# Patient Record
Sex: MALE | Race: WHITE | Employment: OTHER | URBAN - NONMETROPOLITAN AREA
[De-identification: names, ages, dates, MRNs, and addresses within clinical notes are randomized per-mention and may not be internally consistent; named-entity substitution may affect disease eponyms.]

---

## 2018-11-13 PROBLEM — Z85.46 HISTORY OF PROSTATE CANCER: Status: ACTIVE | Noted: 2018-11-13

## 2018-11-13 PROBLEM — E89.0 POSTOPERATIVE HYPOTHYROIDISM: Status: ACTIVE | Noted: 2018-11-13

## 2018-11-13 PROBLEM — I10 ESSENTIAL HYPERTENSION: Status: ACTIVE | Noted: 2018-11-13

## 2018-11-13 PROBLEM — M16.0 OSTEOARTHRITIS OF BOTH HIPS: Status: ACTIVE | Noted: 2018-11-13

## 2018-11-13 PROBLEM — E78.5 HYPERLIPIDEMIA: Status: ACTIVE | Noted: 2018-11-13

## 2018-11-13 PROBLEM — Z92.3 H/O HEAD AND NECK RADIATION: Status: ACTIVE | Noted: 2018-11-13

## 2018-12-17 RX ORDER — SIMVASTATIN 20 MG
20 TABLET ORAL NIGHTLY
Qty: 30 TABLET | Refills: 3 | Status: SHIPPED | OUTPATIENT
Start: 2018-12-17 | End: 2018-12-17 | Stop reason: SDUPTHER

## 2018-12-17 RX ORDER — LORATADINE 10 MG/1
10 TABLET ORAL DAILY
Qty: 30 TABLET | Refills: 3 | Status: SHIPPED | OUTPATIENT
Start: 2018-12-17 | End: 2019-04-26 | Stop reason: SDUPTHER

## 2018-12-17 RX ORDER — LEVOTHYROXINE SODIUM 0.12 MG/1
125 TABLET ORAL DAILY
Qty: 30 TABLET | Refills: 3 | Status: SHIPPED | OUTPATIENT
Start: 2018-12-17 | End: 2018-12-17 | Stop reason: SDUPTHER

## 2018-12-17 RX ORDER — MELOXICAM 15 MG/1
15 TABLET ORAL DAILY
Qty: 30 TABLET | Refills: 3 | Status: SHIPPED | OUTPATIENT
Start: 2018-12-17 | End: 2019-04-08 | Stop reason: SDUPTHER

## 2018-12-18 ENCOUNTER — TELEPHONE (OUTPATIENT)
Dept: PRIMARY CARE CLINIC | Age: 83
End: 2018-12-18

## 2018-12-19 RX ORDER — LEVOTHYROXINE SODIUM 0.12 MG/1
125 TABLET ORAL DAILY
Qty: 90 TABLET | Refills: 3 | Status: SHIPPED | OUTPATIENT
Start: 2018-12-19 | End: 2019-04-26 | Stop reason: SDUPTHER

## 2018-12-19 RX ORDER — SIMVASTATIN 20 MG
TABLET ORAL
Qty: 90 TABLET | Refills: 3 | Status: SHIPPED | OUTPATIENT
Start: 2018-12-19 | End: 2019-04-26 | Stop reason: SDUPTHER

## 2019-04-08 RX ORDER — MELOXICAM 15 MG/1
15 TABLET ORAL DAILY
Qty: 30 TABLET | Refills: 3 | Status: SHIPPED | OUTPATIENT
Start: 2019-04-08 | End: 2019-08-02 | Stop reason: SDUPTHER

## 2019-04-08 NOTE — TELEPHONE ENCOUNTER
Last visit: 11/13/18  Last Med refill: 12/17/18  Does patient have enough medication for 72 hours: No      Next Visit Date:  No future appointments.     Health Maintenance   Topic Date Due    TSH testing  07/02/1932    Potassium monitoring  07/02/1932    Creatinine monitoring  07/02/1932    DTaP/Tdap/Td vaccine (1 - Tdap) 07/02/1951    Shingles Vaccine (1 of 2) 07/02/1982    Pneumococcal 65+ years Vaccine (1 of 2 - PCV13) 07/02/1997    Flu vaccine (Season Ended) 09/01/2019       No results found for: LABA1C          ( goal A1C is < 7)   No results found for: LABMICR  No results found for: LDLCHOLESTEROL, LDLCALC    (goal LDL is <100)   No results found for: AST, ALT, BUN  BP Readings from Last 3 Encounters:   11/13/18 (!) 140/76          (goal 120/80)    All Future Testing planned in [facility]              Patient Active Problem List:     Postoperative hypothyroidism     Essential hypertension     Hyperlipidemia     Osteoarthritis of both hips     H/O head and neck radiation     History of prostate cancer

## 2019-04-18 ENCOUNTER — OFFICE VISIT (OUTPATIENT)
Dept: PRIMARY CARE CLINIC | Age: 84
End: 2019-04-18
Payer: COMMERCIAL

## 2019-04-18 VITALS
RESPIRATION RATE: 16 BRPM | HEIGHT: 69 IN | SYSTOLIC BLOOD PRESSURE: 136 MMHG | HEART RATE: 68 BPM | BODY MASS INDEX: 30.07 KG/M2 | DIASTOLIC BLOOD PRESSURE: 88 MMHG | WEIGHT: 203 LBS

## 2019-04-18 DIAGNOSIS — Z85.850 HISTORY OF THYROID CANCER: ICD-10-CM

## 2019-04-18 DIAGNOSIS — M16.0 OSTEOARTHRITIS OF BOTH HIPS, UNSPECIFIED OSTEOARTHRITIS TYPE: ICD-10-CM

## 2019-04-18 DIAGNOSIS — E89.0 POSTOPERATIVE HYPOTHYROIDISM: Primary | ICD-10-CM

## 2019-04-18 DIAGNOSIS — E78.5 HYPERLIPIDEMIA, UNSPECIFIED HYPERLIPIDEMIA TYPE: ICD-10-CM

## 2019-04-18 DIAGNOSIS — Z85.46 HISTORY OF PROSTATE CANCER: ICD-10-CM

## 2019-04-18 DIAGNOSIS — I10 ESSENTIAL HYPERTENSION: ICD-10-CM

## 2019-04-18 PROCEDURE — 99214 OFFICE O/P EST MOD 30 MIN: CPT | Performed by: NURSE PRACTITIONER

## 2019-04-18 ASSESSMENT — PATIENT HEALTH QUESTIONNAIRE - PHQ9
SUM OF ALL RESPONSES TO PHQ QUESTIONS 1-9: 0
SUM OF ALL RESPONSES TO PHQ QUESTIONS 1-9: 0
2. FEELING DOWN, DEPRESSED OR HOPELESS: 0
1. LITTLE INTEREST OR PLEASURE IN DOING THINGS: 0
SUM OF ALL RESPONSES TO PHQ9 QUESTIONS 1 & 2: 0

## 2019-04-18 ASSESSMENT — ENCOUNTER SYMPTOMS
NAUSEA: 0
SHORTNESS OF BREATH: 0
DIARRHEA: 0
PHOTOPHOBIA: 0
COUGH: 0
BACK PAIN: 0
CONSTIPATION: 0
VOMITING: 0

## 2019-04-18 NOTE — PROGRESS NOTES
[address] [facility]  [id number] [facility]  Dept: [phone]    Missy Dorado is a 80 y.o. male who presents today for his medical conditions/complaintsas noted below. Chief Complaint   Patient presents with    Medication Check       HPI:     HPI  He has been taking \"move free\"  For a couple moths  He takes medication as stated. Biggest concern is his wife  His wife's short time memory is getting worse - he is having some trouble getting wife to take medication. His wife sometimes drops pills. Wife sleeps a lot. He remains upset that he can not sing as well as he used to sing. He has neck swelling and stiffness  He has several areas of skin discolor on his scalp some actinic keratosis and some nevi that should be examined closer. No results found for: LDLCHOLESTEROL, LDLCALC    (goal LDL is <100)   No results found for: AST, ALT, BUN  BP Readings from Last 3 Encounters:   19 136/88   18 (!) 140/76          (goal 120/80)    Past Medical History:   Diagnosis Date    Cancer ([address]     prostate, thyroid    Hyperlipidemia     Hypertension       Past Surgical History:   Procedure Laterality Date    CARPAL TUNNEL RELEASE      MOUTH SURGERY      PROSTATECTOMY      THYROIDECTOMY         Family History   Problem Relation Age of Onset    Prostate Cancer Father     Diabetes Brother        Social History     Tobacco Use    Smoking status: Former Smoker     Packs/day: 1.00     Years: 20.00     Pack years: 20.00     Types: Cigarettes     Last attempt to quit:      Years since quittin.3    Smokeless tobacco: Never Used   Substance Use Topics    Alcohol use:  Yes     Alcohol/week: 9.6 oz     Types: 2 Glasses of wine, 14 Shots of liquor per week      Current Outpatient Medications   Medication Sig Dispense Refill    meloxicam (MOBIC) 15 MG tablet Take 1 tablet by mouth daily 30 tablet 3    metoprolol tartrate (LOPRESSOR) 25

## 2019-04-26 ENCOUNTER — HOSPITAL ENCOUNTER (OUTPATIENT)
Dept: CT IMAGING | Age: 84
Discharge: HOME | End: 2019-04-28
Payer: MEDICARE

## 2019-04-26 ENCOUNTER — TELEPHONE (OUTPATIENT)
Dept: PRIMARY CARE CLINIC | Age: 84
End: 2019-04-26

## 2019-04-26 ENCOUNTER — HOSPITAL ENCOUNTER (OUTPATIENT)
Age: 84
Discharge: HOME | End: 2019-04-26
Payer: MEDICARE

## 2019-04-26 DIAGNOSIS — E89.0 POSTOPERATIVE HYPOTHYROIDISM: ICD-10-CM

## 2019-04-26 DIAGNOSIS — E78.5 HYPERLIPIDEMIA, UNSPECIFIED HYPERLIPIDEMIA TYPE: ICD-10-CM

## 2019-04-26 DIAGNOSIS — Z85.850 HISTORY OF THYROID CANCER: Primary | ICD-10-CM

## 2019-04-26 DIAGNOSIS — Z85.850 HISTORY OF THYROID CANCER: ICD-10-CM

## 2019-04-26 DIAGNOSIS — E89.0 POSTOPERATIVE HYPOTHYROIDISM: Primary | ICD-10-CM

## 2019-04-26 DIAGNOSIS — Z85.46 HISTORY OF PROSTATE CANCER: ICD-10-CM

## 2019-04-26 DIAGNOSIS — I10 ESSENTIAL HYPERTENSION: ICD-10-CM

## 2019-04-26 LAB
ABSOLUTE EOS #: 0.12 K/UL (ref 0–0.44)
ABSOLUTE IMMATURE GRANULOCYTE: 0.03 K/UL (ref 0–0.3)
ABSOLUTE LYMPH #: 1.15 K/UL (ref 1.1–3.7)
ABSOLUTE MONO #: 0.65 K/UL (ref 0.1–1.2)
ALBUMIN SERPL-MCNC: 4.5 G/DL (ref 3.5–5.2)
ALBUMIN/GLOBULIN RATIO: 1.7 (ref 1–2.5)
ALP BLD-CCNC: 57 U/L (ref 40–129)
ALT SERPL-CCNC: 27 U/L (ref 5–41)
ANION GAP SERPL CALCULATED.3IONS-SCNC: 13 MMOL/L (ref 9–17)
AST SERPL-CCNC: 33 U/L
BASOPHILS # BLD: 1 % (ref 0–2)
BASOPHILS ABSOLUTE: 0.07 K/UL (ref 0–0.2)
BILIRUB SERPL-MCNC: 0.74 MG/DL (ref 0.3–1.2)
BILIRUBIN DIRECT: 0.18 MG/DL
BILIRUBIN, INDIRECT: 0.56 MG/DL (ref 0–1)
BUN BLDV-MCNC: 11 MG/DL (ref 8–23)
BUN/CREAT BLD: ABNORMAL (ref 9–20)
CALCIUM SERPL-MCNC: 8.7 MG/DL (ref 8.6–10.4)
CHLORIDE BLD-SCNC: 105 MMOL/L (ref 98–107)
CHOLESTEROL, FASTING: 151 MG/DL
CHOLESTEROL/HDL RATIO: 3.5
CO2: 26 MMOL/L (ref 20–31)
CREAT SERPL-MCNC: 0.71 MG/DL (ref 0.7–1.2)
DIFFERENTIAL TYPE: ABNORMAL
EOSINOPHILS RELATIVE PERCENT: 2 % (ref 1–4)
GFR AFRICAN AMERICAN: >60 ML/MIN
GFR NON-AFRICAN AMERICAN: >60 ML/MIN
GFR SERPL CREATININE-BSD FRML MDRD: ABNORMAL ML/MIN/{1.73_M2}
GFR SERPL CREATININE-BSD FRML MDRD: ABNORMAL ML/MIN/{1.73_M2}
GLOBULIN: NORMAL G/DL (ref 1.5–3.8)
GLUCOSE FASTING: 117 MG/DL (ref 70–99)
HCT VFR BLD CALC: 44.7 % (ref 40.7–50.3)
HDLC SERPL-MCNC: 43 MG/DL
HEMOGLOBIN: 14.8 G/DL (ref 13–17)
IMMATURE GRANULOCYTES: 1 %
LDL CHOLESTEROL: 58 MG/DL (ref 0–130)
LYMPHOCYTES # BLD: 20 % (ref 24–43)
MCH RBC QN AUTO: 32.7 PG (ref 25.2–33.5)
MCHC RBC AUTO-ENTMCNC: 33.1 G/DL (ref 28.4–34.8)
MCV RBC AUTO: 98.7 FL (ref 82.6–102.9)
MONOCYTES # BLD: 12 % (ref 3–12)
NRBC AUTOMATED: 0 PER 100 WBC
PDW BLD-RTO: 12.6 % (ref 11.8–14.4)
PLATELET # BLD: 188 K/UL (ref 138–453)
PLATELET ESTIMATE: ABNORMAL
PMV BLD AUTO: 11.6 FL (ref 8.1–13.5)
POTASSIUM SERPL-SCNC: 4.6 MMOL/L (ref 3.7–5.3)
PROSTATE SPECIFIC ANTIGEN: 1.67 UG/L
RBC # BLD: 4.53 M/UL (ref 4.21–5.77)
RBC # BLD: ABNORMAL 10*6/UL
SEG NEUTROPHILS: 64 % (ref 36–65)
SEGMENTED NEUTROPHILS ABSOLUTE COUNT: 3.63 K/UL (ref 1.5–8.1)
SODIUM BLD-SCNC: 144 MMOL/L (ref 135–144)
THYROXINE, FREE: 1.77 NG/DL (ref 0.93–1.7)
TOTAL PROTEIN: 7.1 G/DL (ref 6.4–8.3)
TRIGLYCERIDE, FASTING: 248 MG/DL
TSH SERPL DL<=0.05 MIU/L-ACNC: 0.23 MIU/L (ref 0.3–5)
VLDLC SERPL CALC-MCNC: ABNORMAL MG/DL (ref 1–30)
WBC # BLD: 5.7 K/UL (ref 3.5–11.3)
WBC # BLD: ABNORMAL 10*3/UL

## 2019-04-26 PROCEDURE — 80048 BASIC METABOLIC PNL TOTAL CA: CPT

## 2019-04-26 PROCEDURE — 36415 COLL VENOUS BLD VENIPUNCTURE: CPT

## 2019-04-26 PROCEDURE — G0103 PSA SCREENING: HCPCS

## 2019-04-26 PROCEDURE — 84443 ASSAY THYROID STIM HORMONE: CPT

## 2019-04-26 PROCEDURE — 84439 ASSAY OF FREE THYROXINE: CPT

## 2019-04-26 PROCEDURE — 2580000003 HC RX 258: Performed by: NURSE PRACTITIONER

## 2019-04-26 PROCEDURE — 6360000004 HC RX CONTRAST MEDICATION: Performed by: NURSE PRACTITIONER

## 2019-04-26 PROCEDURE — 70491 CT SOFT TISSUE NECK W/DYE: CPT

## 2019-04-26 PROCEDURE — 80061 LIPID PANEL: CPT

## 2019-04-26 PROCEDURE — 85025 COMPLETE CBC W/AUTO DIFF WBC: CPT

## 2019-04-26 PROCEDURE — 80076 HEPATIC FUNCTION PANEL: CPT

## 2019-04-26 RX ORDER — SODIUM CHLORIDE 0.9 % (FLUSH) 0.9 %
10 SYRINGE (ML) INJECTION PRN
Status: DISCONTINUED | OUTPATIENT
Start: 2019-04-26 | End: 2019-04-29 | Stop reason: HOSPADM

## 2019-04-26 RX ORDER — 0.9 % SODIUM CHLORIDE 0.9 %
80 INTRAVENOUS SOLUTION INTRAVENOUS ONCE
Status: COMPLETED | OUTPATIENT
Start: 2019-04-26 | End: 2019-04-26

## 2019-04-26 RX ORDER — LEVOTHYROXINE SODIUM 112 UG/1
112 TABLET ORAL DAILY
Qty: 90 TABLET | Refills: 1 | Status: SHIPPED | OUTPATIENT
Start: 2019-04-26 | End: 2019-10-18 | Stop reason: SDUPTHER

## 2019-04-26 RX ORDER — SIMVASTATIN 40 MG
40 TABLET ORAL NIGHTLY
Qty: 90 TABLET | Refills: 2 | Status: SHIPPED | OUTPATIENT
Start: 2019-04-26 | End: 2020-01-20

## 2019-04-26 RX ORDER — LORATADINE 10 MG/1
10 TABLET ORAL DAILY
Qty: 30 TABLET | Refills: 3 | Status: SHIPPED | OUTPATIENT
Start: 2019-04-26 | End: 2019-08-19 | Stop reason: SDUPTHER

## 2019-04-26 RX ADMIN — Medication 10 ML: at 10:33

## 2019-04-26 RX ADMIN — IOVERSOL 100 ML: 741 INJECTION INTRA-ARTERIAL; INTRAVENOUS at 10:23

## 2019-04-26 RX ADMIN — SODIUM CHLORIDE 80 ML: 9 INJECTION, SOLUTION INTRAVENOUS at 10:32

## 2019-04-26 NOTE — TELEPHONE ENCOUNTER
Glenn Patient  Lexi with Ruthie Johnson at [facility] calling to say that Pt has an appointment at 10 AM and has an order for a CT W WO Contrast that states that the reason for exam is history of thyroid cancer. Mary Washington said that if the looking for thyroid cancer then the order needs to be changed to Soft Tissue neck without contrast   Lexi said that the only thing she finds in the notes is that Pt neck swelling and stiffness. Ok to change order?  Please advise

## 2019-04-26 NOTE — TELEPHONE ENCOUNTER
Last visit: 4/18/19  Last Med refill: 12/17/18  Does patient have enough medication for 72 hours: No    Next Visit Date:  Future Appointments   Date Time Provider Ashok Carcamo   5/17/2019  1:00 PM LANETTE Mancuso  [facility]   10/24/2019  2:00 PM LO Fisher CNP [facility] [place] [address] Maintenance   Topic Date Due    TSH testing  07/02/1932    Potassium monitoring  07/02/1932    Creatinine monitoring  07/02/1932    DTaP/Tdap/Td vaccine (1 - Tdap) 07/02/1951    Pneumococcal 65+ years Vaccine (1 of 2 - PCV13) 07/02/1997    Shingles Vaccine (2 of 2) 05/07/2019    Flu vaccine (Season Ended) 09/01/2019       No results found for: LABA1C          ( goal A1C is < 7)   No results found for: LABMICR  No results found for: LDLCHOLESTEROL, LDLCALC    (goal LDL is <100)   BUN (mg/dL)   Date Value   04/26/2019 11     BP Readings from Last 3 Encounters:   04/18/19 136/88   11/13/18 (!) 140/76          (goal 120/80)    All Future Testing planned in [facility]              Patient Active Problem List:     Postoperative hypothyroidism     Essential hypertension     Hyperlipidemia     Osteoarthritis of both hips     H/O head and neck radiation     History of prostate cancer

## 2019-05-17 ENCOUNTER — OFFICE VISIT (OUTPATIENT)
Dept: PRIMARY CARE CLINIC | Age: 84
End: 2019-05-17
Payer: MEDICARE

## 2019-05-17 VITALS
OXYGEN SATURATION: 98 % | BODY MASS INDEX: 29.83 KG/M2 | DIASTOLIC BLOOD PRESSURE: 80 MMHG | HEART RATE: 66 BPM | SYSTOLIC BLOOD PRESSURE: 156 MMHG | WEIGHT: 202 LBS

## 2019-05-17 DIAGNOSIS — L57.0 ACTINIC KERATOSIS: ICD-10-CM

## 2019-05-17 DIAGNOSIS — D48.5 NEOPLASM OF UNCERTAIN BEHAVIOR OF SKIN: ICD-10-CM

## 2019-05-17 PROCEDURE — 1123F ACP DISCUSS/DSCN MKR DOCD: CPT | Performed by: PHYSICIAN ASSISTANT

## 2019-05-17 PROCEDURE — G8419 CALC BMI OUT NRM PARAM NOF/U: HCPCS | Performed by: PHYSICIAN ASSISTANT

## 2019-05-17 PROCEDURE — 99213 OFFICE O/P EST LOW 20 MIN: CPT | Performed by: PHYSICIAN ASSISTANT

## 2019-05-17 PROCEDURE — 1036F TOBACCO NON-USER: CPT | Performed by: PHYSICIAN ASSISTANT

## 2019-05-17 PROCEDURE — 4040F PNEUMOC VAC/ADMIN/RCVD: CPT | Performed by: PHYSICIAN ASSISTANT

## 2019-05-17 PROCEDURE — G8427 DOCREV CUR MEDS BY ELIG CLIN: HCPCS | Performed by: PHYSICIAN ASSISTANT

## 2019-05-17 ASSESSMENT — ENCOUNTER SYMPTOMS
EYES NEGATIVE: 1
RESPIRATORY NEGATIVE: 1
COLOR CHANGE: 0
ABDOMINAL PAIN: 0

## 2019-05-17 NOTE — PROGRESS NOTES
Skin Spot Check    5/17/2019  [facility]  7/2/1932  Chief Complaint   Patient presents with    Other     Pt is concerned with a sopt on top of head x 3 weeks. Pt states person hx of skin caner rt ear. Pt denies use of sunscreen. Location:  Top of head  Duration:  3 weeks  Itch:  Yes  Bleed:  Yes, when picks off the scab  Growing:  Unsure  History of skin cancer: Yes   Unsure. Had Mohs done on right ear in past , BCC, SCC, melanoma and none. Also has had 5FU treatments for AKs    Review of Systems   Constitutional: Negative for chills, diaphoresis, fever and unexpected weight change. HENT: Negative. Negative for mouth sores. Eyes: Negative. Respiratory: Negative. Cardiovascular: Negative. Gastrointestinal: Negative for abdominal pain. Musculoskeletal: Negative for arthralgias and myalgias. Skin: Negative for color change, pallor, rash and wound. Allergic/Immunologic: Negative for environmental allergies, food allergies and immunocompromised state. Hematological: Negative for adenopathy. Physical Exam  Face:  Several rough papules and macules, no wounds, no dryness or color changes  Ears:  Scar on right ear lobe from previous Mohs surgery, no wounds, nodryness or color changes. Rough papules  Scalp:  Appx dime sized lesion on top of scalp that has lost its scab recently          ICD-10-CM    1. Actinic keratosis L57.0    2. Neoplasm of uncertain behavior of skin D48.5        Refer to Dr. Tita Balbuena for scalp lesion.     Reviewed proper skin care and sun protection  Brochure given on skin cancer        Deepak Malik, [facility]

## 2019-08-02 RX ORDER — MELOXICAM 15 MG/1
15 TABLET ORAL DAILY
Qty: 30 TABLET | Refills: 0 | Status: SHIPPED | OUTPATIENT
Start: 2019-08-02 | End: 2019-08-30 | Stop reason: SDUPTHER

## 2019-08-19 RX ORDER — LORATADINE 10 MG/1
10 TABLET ORAL DAILY
Qty: 30 TABLET | Refills: 0 | Status: SHIPPED | OUTPATIENT
Start: 2019-08-19 | End: 2019-09-17 | Stop reason: SDUPTHER

## 2019-08-31 RX ORDER — MELOXICAM 15 MG/1
15 TABLET ORAL DAILY
Qty: 30 TABLET | Refills: 0 | Status: SHIPPED | OUTPATIENT
Start: 2019-08-31 | End: 2019-09-30 | Stop reason: SDUPTHER

## 2019-09-17 RX ORDER — LORATADINE 10 MG/1
10 TABLET ORAL DAILY
Qty: 30 TABLET | Refills: 0 | Status: SHIPPED | OUTPATIENT
Start: 2019-09-17 | End: 2019-10-14 | Stop reason: SDUPTHER

## 2019-10-14 RX ORDER — LORATADINE 10 MG/1
10 TABLET ORAL DAILY
Qty: 30 TABLET | Refills: 0 | Status: SHIPPED | OUTPATIENT
Start: 2019-10-14 | End: 2019-11-17 | Stop reason: SDUPTHER

## 2019-10-18 DIAGNOSIS — E89.0 POSTOPERATIVE HYPOTHYROIDISM: ICD-10-CM

## 2019-10-18 RX ORDER — LEVOTHYROXINE SODIUM 112 UG/1
TABLET ORAL
Qty: 90 TABLET | Refills: 0 | Status: SHIPPED | OUTPATIENT
Start: 2019-10-18 | End: 2020-01-20

## 2019-10-24 ENCOUNTER — HOSPITAL ENCOUNTER (OUTPATIENT)
Age: 84
Setting detail: SPECIMEN
Discharge: HOME | End: 2019-10-24
Payer: MEDICARE

## 2019-10-24 ENCOUNTER — OFFICE VISIT (OUTPATIENT)
Dept: PRIMARY CARE CLINIC | Age: 84
End: 2019-10-24
Payer: MEDICARE

## 2019-10-24 VITALS
BODY MASS INDEX: 30.78 KG/M2 | HEIGHT: 69 IN | DIASTOLIC BLOOD PRESSURE: 72 MMHG | SYSTOLIC BLOOD PRESSURE: 130 MMHG | HEART RATE: 66 BPM | OXYGEN SATURATION: 96 % | RESPIRATION RATE: 16 BRPM | WEIGHT: 207.8 LBS

## 2019-10-24 DIAGNOSIS — Z00.00 ROUTINE GENERAL MEDICAL EXAMINATION AT A HEALTH CARE FACILITY: ICD-10-CM

## 2019-10-24 DIAGNOSIS — E89.0 POSTOPERATIVE HYPOTHYROIDISM: Primary | ICD-10-CM

## 2019-10-24 DIAGNOSIS — E89.0 POSTOPERATIVE HYPOTHYROIDISM: ICD-10-CM

## 2019-10-24 LAB — TSH SERPL DL<=0.05 MIU/L-ACNC: 1.66 MIU/L (ref 0.3–5)

## 2019-10-24 PROCEDURE — G0438 PPPS, INITIAL VISIT: HCPCS | Performed by: NURSE PRACTITIONER

## 2019-10-24 PROCEDURE — 4040F PNEUMOC VAC/ADMIN/RCVD: CPT | Performed by: NURSE PRACTITIONER

## 2019-10-24 PROCEDURE — 1123F ACP DISCUSS/DSCN MKR DOCD: CPT | Performed by: NURSE PRACTITIONER

## 2019-10-24 PROCEDURE — G8482 FLU IMMUNIZE ORDER/ADMIN: HCPCS | Performed by: NURSE PRACTITIONER

## 2019-10-24 ASSESSMENT — PATIENT HEALTH QUESTIONNAIRE - PHQ9
SUM OF ALL RESPONSES TO PHQ QUESTIONS 1-9: 0
SUM OF ALL RESPONSES TO PHQ QUESTIONS 1-9: 0

## 2020-01-01 ENCOUNTER — HOSPITAL ENCOUNTER (OUTPATIENT)
Dept: INFUSION THERAPY | Age: 85
Discharge: HOME | End: 2020-12-08
Payer: MEDICARE

## 2020-01-01 ENCOUNTER — OFFICE VISIT (OUTPATIENT)
Dept: ONCOLOGY | Age: 85
End: 2020-01-01
Payer: MEDICARE

## 2020-01-01 ENCOUNTER — TELEPHONE (OUTPATIENT)
Dept: ONCOLOGY | Age: 85
End: 2020-01-01

## 2020-01-01 ENCOUNTER — TELEPHONE (OUTPATIENT)
Dept: PRIMARY CARE CLINIC | Age: 85
End: 2020-01-01

## 2020-01-01 ENCOUNTER — HOSPITAL ENCOUNTER (OUTPATIENT)
Dept: INFUSION THERAPY | Age: 85
Discharge: HOME | End: 2020-11-13
Payer: MEDICARE

## 2020-01-01 ENCOUNTER — TELEPHONE (OUTPATIENT)
Dept: PULMONOLOGY | Age: 85
End: 2020-01-01

## 2020-01-01 ENCOUNTER — TELEPHONE (OUTPATIENT)
Dept: CASE MANAGEMENT | Age: 85
End: 2020-01-01

## 2020-01-01 ENCOUNTER — HOSPITAL ENCOUNTER (OUTPATIENT)
Dept: INFUSION THERAPY | Age: 85
Discharge: HOME | End: 2020-12-29
Payer: MEDICARE

## 2020-01-01 ENCOUNTER — OFFICE VISIT (OUTPATIENT)
Dept: PRIMARY CARE CLINIC | Age: 85
End: 2020-01-01
Payer: MEDICARE

## 2020-01-01 VITALS
SYSTOLIC BLOOD PRESSURE: 138 MMHG | HEART RATE: 105 BPM | DIASTOLIC BLOOD PRESSURE: 64 MMHG | RESPIRATION RATE: 18 BRPM | TEMPERATURE: 97.5 F | BODY MASS INDEX: 24.13 KG/M2 | WEIGHT: 163.4 LBS

## 2020-01-01 VITALS
DIASTOLIC BLOOD PRESSURE: 63 MMHG | HEART RATE: 76 BPM | SYSTOLIC BLOOD PRESSURE: 110 MMHG | BODY MASS INDEX: 26.06 KG/M2 | TEMPERATURE: 97.7 F | WEIGHT: 176.5 LBS

## 2020-01-01 VITALS
BODY MASS INDEX: 25.08 KG/M2 | HEART RATE: 76 BPM | WEIGHT: 169.8 LBS | SYSTOLIC BLOOD PRESSURE: 105 MMHG | DIASTOLIC BLOOD PRESSURE: 50 MMHG | TEMPERATURE: 97.5 F

## 2020-01-01 VITALS
BODY MASS INDEX: 24.87 KG/M2 | OXYGEN SATURATION: 91 % | TEMPERATURE: 97 F | HEART RATE: 84 BPM | DIASTOLIC BLOOD PRESSURE: 54 MMHG | SYSTOLIC BLOOD PRESSURE: 112 MMHG | WEIGHT: 168.4 LBS

## 2020-01-01 DIAGNOSIS — C34.91 NON-SMALL CELL CARCINOMA OF RIGHT LUNG, STAGE 4 (HCC): ICD-10-CM

## 2020-01-01 DIAGNOSIS — C34.91 NON-SMALL CELL CARCINOMA OF RIGHT LUNG, STAGE 4 (HCC): Primary | ICD-10-CM

## 2020-01-01 DIAGNOSIS — E89.0 POSTOPERATIVE HYPOTHYROIDISM: Primary | ICD-10-CM

## 2020-01-01 DIAGNOSIS — I10 ESSENTIAL HYPERTENSION: ICD-10-CM

## 2020-01-01 LAB
ABSOLUTE EOS #: 0.2 K/UL (ref 0–0.4)
ABSOLUTE EOS #: 0.2 K/UL (ref 0–0.4)
ABSOLUTE EOS #: 0.3 K/UL (ref 0–0.4)
ABSOLUTE IMMATURE GRANULOCYTE: ABNORMAL K/UL (ref 0–0.3)
ABSOLUTE LYMPH #: 0.6 K/UL (ref 1–4.8)
ABSOLUTE LYMPH #: 0.7 K/UL (ref 1–4.8)
ABSOLUTE LYMPH #: 0.7 K/UL (ref 1–4.8)
ABSOLUTE MONO #: 0.7 K/UL (ref 0.1–1.2)
ABSOLUTE MONO #: 0.7 K/UL (ref 0.1–1.2)
ABSOLUTE MONO #: 0.8 K/UL (ref 0.1–1.2)
ALBUMIN SERPL-MCNC: 3.5 G/DL (ref 3.5–5.2)
ALBUMIN SERPL-MCNC: 3.6 G/DL (ref 3.5–5.2)
ALBUMIN SERPL-MCNC: 3.7 G/DL (ref 3.5–5.2)
ALBUMIN/GLOBULIN RATIO: 1.5 (ref 1–2.5)
ALBUMIN/GLOBULIN RATIO: 1.5 (ref 1–2.5)
ALBUMIN/GLOBULIN RATIO: 1.8 (ref 1–2.5)
ALP BLD-CCNC: 61 U/L (ref 40–129)
ALP BLD-CCNC: 66 U/L (ref 40–129)
ALP BLD-CCNC: 67 U/L (ref 40–129)
ALT SERPL-CCNC: 7 U/L (ref 5–41)
ALT SERPL-CCNC: 7 U/L (ref 5–41)
ALT SERPL-CCNC: 8 U/L (ref 5–41)
AMYLASE: 49 U/L (ref 28–100)
AMYLASE: 55 U/L (ref 28–100)
ANION GAP SERPL CALCULATED.3IONS-SCNC: 10 MMOL/L (ref 9–17)
ANION GAP SERPL CALCULATED.3IONS-SCNC: 10 MMOL/L (ref 9–17)
ANION GAP SERPL CALCULATED.3IONS-SCNC: 9 MMOL/L (ref 9–17)
AST SERPL-CCNC: 14 U/L
AST SERPL-CCNC: 16 U/L
AST SERPL-CCNC: 18 U/L
BASOPHILS # BLD: 1 % (ref 0–2)
BASOPHILS # BLD: 1 % (ref 0–2)
BASOPHILS # BLD: 2 % (ref 0–2)
BASOPHILS ABSOLUTE: 0 K/UL (ref 0–0.2)
BASOPHILS ABSOLUTE: 0.1 K/UL (ref 0–0.2)
BASOPHILS ABSOLUTE: 0.1 K/UL (ref 0–0.2)
BILIRUB SERPL-MCNC: 0.37 MG/DL (ref 0.3–1.2)
BILIRUB SERPL-MCNC: 0.43 MG/DL (ref 0.3–1.2)
BILIRUB SERPL-MCNC: 0.5 MG/DL (ref 0.3–1.2)
BUN BLDV-MCNC: 14 MG/DL (ref 8–23)
BUN BLDV-MCNC: 14 MG/DL (ref 8–23)
BUN BLDV-MCNC: 16 MG/DL (ref 8–23)
BUN/CREAT BLD: ABNORMAL (ref 9–20)
CALCIUM SERPL-MCNC: 10.2 MG/DL (ref 8.6–10.4)
CALCIUM SERPL-MCNC: 11.4 MG/DL (ref 8.6–10.4)
CALCIUM SERPL-MCNC: 9.5 MG/DL (ref 8.6–10.4)
CHLORIDE BLD-SCNC: 102 MMOL/L (ref 98–107)
CHLORIDE BLD-SCNC: 102 MMOL/L (ref 98–107)
CHLORIDE BLD-SCNC: 105 MMOL/L (ref 98–107)
CO2: 27 MMOL/L (ref 20–31)
CO2: 29 MMOL/L (ref 20–31)
CO2: 29 MMOL/L (ref 20–31)
CORTISOL COLLECTION INFO: NORMAL
CORTISOL COLLECTION INFO: NORMAL
CORTISOL: 13.8 UG/DL (ref 2.7–18.4)
CORTISOL: 9.2 UG/DL (ref 2.7–18.4)
CREAT SERPL-MCNC: 0.58 MG/DL (ref 0.7–1.2)
CREAT SERPL-MCNC: 0.67 MG/DL (ref 0.7–1.2)
CREAT SERPL-MCNC: 0.77 MG/DL (ref 0.7–1.2)
DIFFERENTIAL TYPE: ABNORMAL
EOSINOPHILS RELATIVE PERCENT: 3 % (ref 1–4)
EOSINOPHILS RELATIVE PERCENT: 3 % (ref 1–4)
EOSINOPHILS RELATIVE PERCENT: 5 % (ref 1–4)
GFR AFRICAN AMERICAN: >60 ML/MIN
GFR NON-AFRICAN AMERICAN: >60 ML/MIN
GFR SERPL CREATININE-BSD FRML MDRD: ABNORMAL ML/MIN/{1.73_M2}
GLUCOSE BLD-MCNC: 121 MG/DL (ref 70–99)
GLUCOSE BLD-MCNC: 145 MG/DL (ref 70–99)
GLUCOSE BLD-MCNC: 154 MG/DL (ref 70–99)
HCT VFR BLD CALC: 37.4 % (ref 41–53)
HCT VFR BLD CALC: 37.8 % (ref 41–53)
HCT VFR BLD CALC: 39.7 % (ref 41–53)
HEMOGLOBIN: 12.3 G/DL (ref 13.5–17.5)
HEMOGLOBIN: 12.4 G/DL (ref 13.5–17.5)
HEMOGLOBIN: 13.3 G/DL (ref 13.5–17.5)
IMMATURE GRANULOCYTES: ABNORMAL %
LIPASE: 26 U/L (ref 13–60)
LIPASE: 28 U/L (ref 13–60)
LYMPHOCYTES # BLD: 10 % (ref 24–44)
LYMPHOCYTES # BLD: 11 % (ref 24–44)
LYMPHOCYTES # BLD: 13 % (ref 24–44)
MCH RBC QN AUTO: 29.6 PG (ref 26–34)
MCH RBC QN AUTO: 30.1 PG (ref 26–34)
MCH RBC QN AUTO: 30.6 PG (ref 26–34)
MCHC RBC AUTO-ENTMCNC: 32.8 G/DL (ref 31–37)
MCHC RBC AUTO-ENTMCNC: 32.9 G/DL (ref 31–37)
MCHC RBC AUTO-ENTMCNC: 33.4 G/DL (ref 31–37)
MCV RBC AUTO: 89.8 FL (ref 80–100)
MCV RBC AUTO: 91.5 FL (ref 80–100)
MCV RBC AUTO: 91.7 FL (ref 80–100)
MONOCYTES # BLD: 11 % (ref 2–11)
MONOCYTES # BLD: 13 % (ref 2–11)
MONOCYTES # BLD: 14 % (ref 2–11)
NRBC AUTOMATED: ABNORMAL PER 100 WBC
PDW BLD-RTO: 13.7 % (ref 12.5–15.4)
PDW BLD-RTO: 14.1 % (ref 12.5–15.4)
PDW BLD-RTO: 14.3 % (ref 12.5–15.4)
PLATELET # BLD: 223 K/UL (ref 140–450)
PLATELET # BLD: 228 K/UL (ref 140–450)
PLATELET # BLD: 259 K/UL (ref 140–450)
PLATELET ESTIMATE: ABNORMAL
PMV BLD AUTO: 8.2 FL (ref 6–12)
PMV BLD AUTO: 8.2 FL (ref 6–12)
PMV BLD AUTO: 8.7 FL (ref 6–12)
POTASSIUM SERPL-SCNC: 4.2 MMOL/L (ref 3.7–5.3)
POTASSIUM SERPL-SCNC: 4.3 MMOL/L (ref 3.7–5.3)
POTASSIUM SERPL-SCNC: 4.5 MMOL/L (ref 3.7–5.3)
RBC # BLD: 4.07 M/UL (ref 4.5–5.9)
RBC # BLD: 4.21 M/UL (ref 4.5–5.9)
RBC # BLD: 4.34 M/UL (ref 4.5–5.9)
RBC # BLD: ABNORMAL 10*6/UL
SEG NEUTROPHILS: 68 % (ref 36–66)
SEG NEUTROPHILS: 72 % (ref 36–66)
SEG NEUTROPHILS: 73 % (ref 36–66)
SEGMENTED NEUTROPHILS ABSOLUTE COUNT: 3.6 K/UL (ref 1.8–7.7)
SEGMENTED NEUTROPHILS ABSOLUTE COUNT: 4.1 K/UL (ref 1.8–7.7)
SEGMENTED NEUTROPHILS ABSOLUTE COUNT: 4.4 K/UL (ref 1.8–7.7)
SODIUM BLD-SCNC: 140 MMOL/L (ref 135–144)
SODIUM BLD-SCNC: 141 MMOL/L (ref 135–144)
SODIUM BLD-SCNC: 142 MMOL/L (ref 135–144)
THYROXINE, FREE: 1.55 NG/DL (ref 0.93–1.7)
THYROXINE, FREE: 2.04 NG/DL (ref 0.93–1.7)
TOTAL PROTEIN: 5.5 G/DL (ref 6.4–8.3)
TOTAL PROTEIN: 6 G/DL (ref 6.4–8.3)
TOTAL PROTEIN: 6.2 G/DL (ref 6.4–8.3)
TSH SERPL DL<=0.05 MIU/L-ACNC: 0.04 MIU/L (ref 0.3–5)
TSH SERPL DL<=0.05 MIU/L-ACNC: 0.19 MIU/L (ref 0.3–5)
WBC # BLD: 5.2 K/UL (ref 3.5–11)
WBC # BLD: 5.8 K/UL (ref 3.5–11)
WBC # BLD: 6 K/UL (ref 3.5–11)
WBC # BLD: ABNORMAL 10*3/UL

## 2020-01-01 PROCEDURE — 99215 OFFICE O/P EST HI 40 MIN: CPT | Performed by: INTERNAL MEDICINE

## 2020-01-01 PROCEDURE — 6360000002 HC RX W HCPCS: Performed by: INTERNAL MEDICINE

## 2020-01-01 PROCEDURE — 82150 ASSAY OF AMYLASE: CPT

## 2020-01-01 PROCEDURE — 99214 OFFICE O/P EST MOD 30 MIN: CPT | Performed by: NURSE PRACTITIONER

## 2020-01-01 PROCEDURE — 96413 CHEMO IV INFUSION 1 HR: CPT

## 2020-01-01 PROCEDURE — 82533 TOTAL CORTISOL: CPT

## 2020-01-01 PROCEDURE — 1036F TOBACCO NON-USER: CPT | Performed by: INTERNAL MEDICINE

## 2020-01-01 PROCEDURE — 80053 COMPREHEN METABOLIC PANEL: CPT

## 2020-01-01 PROCEDURE — 36415 COLL VENOUS BLD VENIPUNCTURE: CPT

## 2020-01-01 PROCEDURE — G8484 FLU IMMUNIZE NO ADMIN: HCPCS | Performed by: INTERNAL MEDICINE

## 2020-01-01 PROCEDURE — G8420 CALC BMI NORM PARAMETERS: HCPCS | Performed by: NURSE PRACTITIONER

## 2020-01-01 PROCEDURE — 4040F PNEUMOC VAC/ADMIN/RCVD: CPT | Performed by: NURSE PRACTITIONER

## 2020-01-01 PROCEDURE — 1123F ACP DISCUSS/DSCN MKR DOCD: CPT | Performed by: INTERNAL MEDICINE

## 2020-01-01 PROCEDURE — 99211 OFF/OP EST MAY X REQ PHY/QHP: CPT | Performed by: INTERNAL MEDICINE

## 2020-01-01 PROCEDURE — 85025 COMPLETE CBC W/AUTO DIFF WBC: CPT

## 2020-01-01 PROCEDURE — 2580000003 HC RX 258: Performed by: INTERNAL MEDICINE

## 2020-01-01 PROCEDURE — 4040F PNEUMOC VAC/ADMIN/RCVD: CPT | Performed by: INTERNAL MEDICINE

## 2020-01-01 PROCEDURE — G8427 DOCREV CUR MEDS BY ELIG CLIN: HCPCS | Performed by: INTERNAL MEDICINE

## 2020-01-01 PROCEDURE — 1036F TOBACCO NON-USER: CPT | Performed by: NURSE PRACTITIONER

## 2020-01-01 PROCEDURE — G8427 DOCREV CUR MEDS BY ELIG CLIN: HCPCS | Performed by: NURSE PRACTITIONER

## 2020-01-01 PROCEDURE — G8417 CALC BMI ABV UP PARAM F/U: HCPCS | Performed by: INTERNAL MEDICINE

## 2020-01-01 PROCEDURE — 84443 ASSAY THYROID STIM HORMONE: CPT

## 2020-01-01 PROCEDURE — 1123F ACP DISCUSS/DSCN MKR DOCD: CPT | Performed by: NURSE PRACTITIONER

## 2020-01-01 PROCEDURE — 83690 ASSAY OF LIPASE: CPT

## 2020-01-01 PROCEDURE — 84439 ASSAY OF FREE THYROXINE: CPT

## 2020-01-01 PROCEDURE — G8484 FLU IMMUNIZE NO ADMIN: HCPCS | Performed by: NURSE PRACTITIONER

## 2020-01-01 RX ORDER — MEPERIDINE HYDROCHLORIDE 50 MG/ML
12.5 INJECTION INTRAMUSCULAR; INTRAVENOUS; SUBCUTANEOUS ONCE
Status: CANCELLED | OUTPATIENT
Start: 2020-01-01

## 2020-01-01 RX ORDER — SODIUM CHLORIDE 0.9 % (FLUSH) 0.9 %
5 SYRINGE (ML) INJECTION PRN
Status: CANCELLED | OUTPATIENT
Start: 2020-01-01

## 2020-01-01 RX ORDER — SODIUM CHLORIDE 9 MG/ML
INJECTION, SOLUTION INTRAVENOUS CONTINUOUS
Status: CANCELLED | OUTPATIENT
Start: 2020-01-01

## 2020-01-01 RX ORDER — DIPHENHYDRAMINE HYDROCHLORIDE 50 MG/ML
50 INJECTION INTRAMUSCULAR; INTRAVENOUS ONCE
Status: CANCELLED | OUTPATIENT
Start: 2020-01-01

## 2020-01-01 RX ORDER — METHYLPREDNISOLONE SODIUM SUCCINATE 125 MG/2ML
125 INJECTION, POWDER, LYOPHILIZED, FOR SOLUTION INTRAMUSCULAR; INTRAVENOUS ONCE
Status: CANCELLED | OUTPATIENT
Start: 2020-01-01

## 2020-01-01 RX ORDER — EPINEPHRINE 1 MG/ML
0.3 INJECTION, SOLUTION, CONCENTRATE INTRAVENOUS PRN
Status: CANCELLED | OUTPATIENT
Start: 2020-01-01

## 2020-01-01 RX ORDER — SODIUM CHLORIDE 0.9 % (FLUSH) 0.9 %
10 SYRINGE (ML) INJECTION PRN
Status: CANCELLED | OUTPATIENT
Start: 2020-01-01

## 2020-01-01 RX ORDER — CEPHALEXIN 500 MG/1
500 CAPSULE ORAL 2 TIMES DAILY
Qty: 14 CAPSULE | Refills: 0 | Status: SHIPPED | OUTPATIENT
Start: 2020-01-01 | End: 2020-01-01 | Stop reason: SDUPTHER

## 2020-01-01 RX ORDER — HEPARIN SODIUM (PORCINE) LOCK FLUSH IV SOLN 100 UNIT/ML 100 UNIT/ML
500 SOLUTION INTRAVENOUS PRN
Status: CANCELLED | OUTPATIENT
Start: 2020-01-01

## 2020-01-01 RX ORDER — SODIUM CHLORIDE 9 MG/ML
20 INJECTION, SOLUTION INTRAVENOUS ONCE
Status: CANCELLED | OUTPATIENT
Start: 2020-01-01

## 2020-01-01 RX ORDER — FUROSEMIDE 20 MG/1
TABLET ORAL
COMMUNITY
Start: 2020-01-01 | End: 2021-01-01

## 2020-01-01 RX ORDER — SODIUM CHLORIDE 9 MG/ML
20 INJECTION, SOLUTION INTRAVENOUS ONCE
Status: COMPLETED | OUTPATIENT
Start: 2020-01-01 | End: 2020-01-01

## 2020-01-01 RX ORDER — CEPHALEXIN 500 MG/1
500 CAPSULE ORAL 2 TIMES DAILY
Qty: 14 CAPSULE | Refills: 0 | Status: SHIPPED | OUTPATIENT
Start: 2020-01-01 | End: 2020-01-01

## 2020-01-01 RX ORDER — POLYETHYLENE GLYCOL 3350 17 G/17G
17 POWDER, FOR SOLUTION ORAL DAILY
Qty: 1530 G | Refills: 1 | Status: SHIPPED | OUTPATIENT
Start: 2020-01-01 | End: 2021-01-01

## 2020-01-01 RX ADMIN — SODIUM CHLORIDE 20 ML/HR: 9 INJECTION, SOLUTION INTRAVENOUS at 11:35

## 2020-01-01 RX ADMIN — SODIUM CHLORIDE 200 MG: 9 INJECTION, SOLUTION INTRAVENOUS at 11:31

## 2020-01-01 RX ADMIN — SODIUM CHLORIDE 200 MG: 9 INJECTION, SOLUTION INTRAVENOUS at 11:47

## 2020-01-01 RX ADMIN — SODIUM CHLORIDE 20 ML/HR: 9 INJECTION, SOLUTION INTRAVENOUS at 10:36

## 2020-01-01 RX ADMIN — SODIUM CHLORIDE 20 ML/HR: 9 INJECTION, SOLUTION INTRAVENOUS at 11:31

## 2020-01-01 RX ADMIN — SODIUM CHLORIDE 200 MG: 9 INJECTION, SOLUTION INTRAVENOUS at 11:12

## 2020-01-01 ASSESSMENT — ENCOUNTER SYMPTOMS
SINUS PAIN: 0
CHEST TIGHTNESS: 0
SHORTNESS OF BREATH: 1
ABDOMINAL PAIN: 1
EYE REDNESS: 0
COUGH: 1
EYE PAIN: 0

## 2020-01-01 ASSESSMENT — PATIENT HEALTH QUESTIONNAIRE - PHQ9: DEPRESSION UNABLE TO ASSESS: URGENT/EMERGENT SITUATION

## 2020-01-20 RX ORDER — SIMVASTATIN 40 MG
TABLET ORAL
Qty: 90 TABLET | Refills: 2 | Status: SHIPPED | OUTPATIENT
Start: 2020-01-20 | End: 2020-10-19 | Stop reason: SDUPTHER

## 2020-01-27 RX ORDER — MELOXICAM 15 MG/1
15 TABLET ORAL DAILY
Qty: 30 TABLET | Refills: 3 | Status: SHIPPED | OUTPATIENT
Start: 2020-01-27 | End: 2020-06-08

## 2020-02-18 RX ORDER — LORATADINE 10 MG/1
10 TABLET ORAL DAILY
Qty: 30 TABLET | Refills: 2 | Status: SHIPPED | OUTPATIENT
Start: 2020-02-18 | End: 2020-05-11

## 2020-06-08 RX ORDER — MELOXICAM 15 MG/1
15 TABLET ORAL DAILY
Qty: 30 TABLET | Refills: 3 | Status: SHIPPED | OUTPATIENT
Start: 2020-06-08 | End: 2020-10-12

## 2020-06-15 RX ORDER — LORATADINE 10 MG/1
10 TABLET ORAL DAILY
Qty: 30 TABLET | Refills: 0 | Status: SHIPPED | OUTPATIENT
Start: 2020-06-15 | End: 2020-07-14

## 2020-08-18 ENCOUNTER — TELEPHONE (OUTPATIENT)
Dept: PRIMARY CARE CLINIC | Age: 85
End: 2020-08-18

## 2020-08-18 NOTE — TELEPHONE ENCOUNTER
Tried to contact the patient to schedule a home visit with Tano Triplett. No answer- unable to leave a VM.

## 2020-08-20 ENCOUNTER — OUTSIDE SERVICES (OUTPATIENT)
Dept: PRIMARY CARE CLINIC | Age: 85
End: 2020-08-20

## 2020-08-20 VITALS
RESPIRATION RATE: 18 BRPM | HEART RATE: 63 BPM | SYSTOLIC BLOOD PRESSURE: 158 MMHG | OXYGEN SATURATION: 98 % | TEMPERATURE: 98.2 F | DIASTOLIC BLOOD PRESSURE: 86 MMHG

## 2020-08-20 ASSESSMENT — ENCOUNTER SYMPTOMS
SORE THROAT: 0
WHEEZING: 0
NAUSEA: 0
EYE DISCHARGE: 0
COUGH: 1
EYE REDNESS: 0
DIARRHEA: 0
ABDOMINAL PAIN: 0
VOMITING: 0
RHINORRHEA: 0
SHORTNESS OF BREATH: 1

## 2020-08-20 NOTE — PROGRESS NOTES
Shahida Hoang is a 80 y.o. male being seen for his 6 month follow up. Location of visit:  patient residence    Visit Date:  8/20/2020       Reason for Visit:    Chief Complaint   Patient presents with   [facility] he feels as though he has aged rapidly over the past 2 years. Cannot walk nearly as far as he use to without getting winded. He is not involved with outside activities as he use to be. He feels some of it is a state of mind. He is concerned with own health but even more concerned with wife's health and his ability to care for her. States he may not be the best care taker    Other   Associated symptoms include coughing and fatigue. Pertinent negatives include no abdominal pain, arthralgias, chest pain, chills, congestion, fever, headaches, myalgias, nausea, rash, sore throat or vomiting. Review of Systems   Constitutional: Positive for fatigue. Negative for chills and fever. HENT: Negative for congestion, ear pain, rhinorrhea and sore throat. Eyes: Negative for discharge and redness. Respiratory: Positive for cough and shortness of breath. Negative for wheezing. Cardiovascular: Negative for chest pain and palpitations. Gastrointestinal: Negative for abdominal pain, diarrhea, nausea and vomiting. Genitourinary: Negative for dysuria and frequency. Musculoskeletal: Negative for arthralgias and myalgias. Skin: Negative for rash and wound. Neurological: Negative for dizziness, light-headedness and headaches. Hematological: Does not bruise/bleed easily. Psychiatric/Behavioral: Positive for behavioral problems. Negative for dysphoric mood and sleep disturbance. The patient is nervous/anxious. Physical Exam  Vitals signs and nursing note reviewed. Constitutional:       General: He is not in acute distress. Appearance: He is well-developed. He is obese. He is not ill-appearing. HENT:      Head: Normocephalic and atraumatic.       Right Ear: External ear normal.      Left Ear: External ear normal.   Eyes:      General: No scleral icterus. Right eye: No discharge. Left eye: No discharge. Conjunctiva/sclera: Conjunctivae normal.      Pupils: Pupils are equal, round, and reactive to light. Neck:      Thyroid: No thyromegaly. Trachea: No tracheal deviation. Cardiovascular:      Rate and Rhythm: Normal rate and regular rhythm. Heart sounds: Normal heart sounds. Pulmonary:      Effort: Pulmonary effort is normal. No respiratory distress. Breath sounds: Normal breath sounds. No wheezing. Abdominal:      General: Bowel sounds are normal.      Palpations: Abdomen is soft. Tenderness: There is no abdominal tenderness. Lymphadenopathy:      Cervical: No cervical adenopathy. Skin:     General: Skin is warm. Findings: No rash. Neurological:      Mental Status: He is alert and oriented to person, place, and time. Comments: Numbness and urticaria right shoulder   Psychiatric:         Mood and Affect: Mood is anxious. Behavior: Behavior normal.         Thought Content: Thought content normal.         Cognition and Memory: Memory normal.      Comments: Talkative about concern for wife           Current Outpatient Medications   Medication Sig Dispense Refill    loratadine (CLARITIN) 10 MG tablet TAKE 1 TABLET BY MOUTH DAILY 30 tablet 2    metoprolol tartrate (LOPRESSOR) 25 MG tablet TAKE 1 TABLET BY MOUTH TWICE DAILY 60 tablet 3    meloxicam (MOBIC) 15 MG tablet TAKE 1 TABLET BY MOUTH DAILY 30 tablet 3    simvastatin (ZOCOR) 40 MG tablet TAKE 1 TABLET BY MOUTH EVERY NIGHT 90 tablet 2    levothyroxine (SYNTHROID) 112 MCG tablet TAKE 1 TABLET BY MOUTH EVERY DAY 90 tablet 2     No current facility-administered medications for this visit.          No Known Allergies     Past Medical History:   Diagnosis Date    Cancer [facility])     prostate, thyroid    Hyperlipidemia     Hypertension         ASSESSMENT:  BP (!) 158/86   Pulse 63   Temp 98.2 °F (36.8 °C)   Resp 18   SpO2 98%       Diagnosis Orders   1. Postoperative hypothyroidism  TSH    T4, Free    Basic Metabolic Panel, Fasting    CBC With Auto Differential   2. Hyperlipidemia, unspecified hyperlipidemia type  Lipid, Fasting    Hepatic Function Panel   3. Essential hypertension  Basic Metabolic Panel, Fasting    CBC With Auto Differential        Plan:  Labs TSH, Free T4, BMP, Hepatic FX panel, CBC, lipids  If blood pressure remains high will start medication    Recheck blood pressure in one month.

## 2020-09-08 ENCOUNTER — TELEPHONE (OUTPATIENT)
Dept: PRIMARY CARE CLINIC | Age: 85
End: 2020-09-08

## 2020-09-08 NOTE — TELEPHONE ENCOUNTER
Patient asking to speak with Doug Razo before scheduling appt. Would not give me any more information about what kind of an appt he was asking for.

## 2020-09-09 ENCOUNTER — NURSE TRIAGE (OUTPATIENT)
Dept: OTHER | Facility: CLINIC | Age: 85
End: 2020-09-09

## 2020-09-09 NOTE — TELEPHONE ENCOUNTER
He is noticeably short of breath. Hard time walking around the court. He has noticed in the past two weeks. No fever or chills. He does not feel hot. It has not gotten worse over the past two weeks. Came on all of a sudden. No swelling. Same cough as usual  And usual clearing throat. No diarrhea. Asked if he should be tested  for Covid. Informed him that if he is positive the treatment is the same he doing write know isolate. May be one of many viruses. If does not improve in a few days call back.

## 2020-09-09 NOTE — TELEPHONE ENCOUNTER
Doctors out of office    Reason for Disposition   MILD difficulty breathing (e.g., minimal/no SOB at rest, SOB with walking, pulse < 100) of new onset or worse than normal    Answer Assessment - Initial Assessment Questions  1. RESPIRATORY STATUS: \"Describe your breathing? \" (e.g., wheezing, shortness of breath, unable to speak, severe coughing)       Pt states he feels like he has been running and out of breath. Pt states he can take the dog out for a walk and be out of breath  2. ONSET: \"When did this breathing problem begin? \"       Approximately two weeks ago  3. PATTERN \"Does the difficult breathing come and go, or has it been constant since it started? \"       Intermittent because when it happens, pt can sit down and rest and it goes a way but when pt gets up and starts moving it happens again. 4. SEVERITY: \"How bad is your breathing? \" (e.g., mild, moderate, severe)     - MILD: No SOB at rest, mild SOB with walking, speaks normally in sentences, can lay down, no retractions, pulse < 100.     - MODERATE: SOB at rest, SOB with minimal exertion and prefers to sit, cannot lie down flat, speaks in phrases, mild retractions, audible wheezing, pulse 100-120.     - SEVERE: Very SOB at rest, speaks in single words, struggling to breathe, sitting hunched forward, retractions, pulse > 120       Mild   5. RECURRENT SYMPTOM: \"Have you had difficulty breathing before? \" If so, ask: \"When was the last time? \" and \"What happened that time? \"       Pt denies   6. CARDIAC HISTORY: \"Do you have any history of heart disease? \" (e.g., heart attack, angina, bypass surgery, angioplasty)       PT denies   7. LUNG HISTORY: \"Do you have any history of lung disease? \"  (e.g., pulmonary embolus, asthma, emphysema)      PT denies   8. CAUSE: \"What do you think is causing the breathing problem? \"       Pt is unsure  9.  OTHER SYMPTOMS: \"Do you have any other symptoms? (e.g., dizziness, runny nose, cough, chest pain, fever)      PT denies but

## 2020-09-10 ENCOUNTER — OUTSIDE SERVICES (OUTPATIENT)
Dept: PRIMARY CARE CLINIC | Age: 85
End: 2020-09-10

## 2020-09-10 ENCOUNTER — HOSPITAL ENCOUNTER (OUTPATIENT)
Age: 85
Discharge: HOME | End: 2020-09-12
Payer: MEDICARE

## 2020-09-10 ENCOUNTER — HOSPITAL ENCOUNTER (OUTPATIENT)
Dept: GENERAL RADIOLOGY | Age: 85
Discharge: HOME | End: 2020-09-12
Payer: MEDICARE

## 2020-09-10 ENCOUNTER — HOSPITAL ENCOUNTER (OUTPATIENT)
Age: 85
Discharge: HOME | End: 2020-09-10
Payer: MEDICARE

## 2020-09-10 VITALS
DIASTOLIC BLOOD PRESSURE: 80 MMHG | OXYGEN SATURATION: 96 % | SYSTOLIC BLOOD PRESSURE: 158 MMHG | RESPIRATION RATE: 18 BRPM | HEART RATE: 97 BPM | TEMPERATURE: 97.7 F

## 2020-09-10 LAB
ABSOLUTE EOS #: 0.1 K/UL (ref 0–0.4)
ABSOLUTE IMMATURE GRANULOCYTE: ABNORMAL K/UL (ref 0–0.3)
ABSOLUTE LYMPH #: 1.3 K/UL (ref 1–4.8)
ABSOLUTE MONO #: 1 K/UL (ref 0.1–1.2)
ANION GAP SERPL CALCULATED.3IONS-SCNC: 11 MMOL/L (ref 9–17)
BASOPHILS # BLD: 1 % (ref 0–2)
BASOPHILS ABSOLUTE: 0.1 K/UL (ref 0–0.2)
BNP INTERPRETATION: NORMAL
BUN BLDV-MCNC: 16 MG/DL (ref 8–23)
BUN/CREAT BLD: ABNORMAL (ref 9–20)
CALCIUM SERPL-MCNC: 9.4 MG/DL (ref 8.6–10.4)
CHLORIDE BLD-SCNC: 101 MMOL/L (ref 98–107)
CO2: 28 MMOL/L (ref 20–31)
CREAT SERPL-MCNC: 0.81 MG/DL (ref 0.7–1.2)
D-DIMER QUANTITATIVE: 0.71 MG/L FEU
DIFFERENTIAL TYPE: ABNORMAL
EOSINOPHILS RELATIVE PERCENT: 1 % (ref 1–4)
GFR AFRICAN AMERICAN: >60 ML/MIN
GFR NON-AFRICAN AMERICAN: >60 ML/MIN
GFR SERPL CREATININE-BSD FRML MDRD: ABNORMAL ML/MIN/{1.73_M2}
GFR SERPL CREATININE-BSD FRML MDRD: ABNORMAL ML/MIN/{1.73_M2}
GLUCOSE BLD-MCNC: 131 MG/DL (ref 70–99)
HCT VFR BLD CALC: 45.2 % (ref 41–53)
HEMOGLOBIN: 15.2 G/DL (ref 13.5–17.5)
IMMATURE GRANULOCYTES: ABNORMAL %
LYMPHOCYTES # BLD: 15 % (ref 24–44)
MCH RBC QN AUTO: 31.9 PG (ref 26–34)
MCHC RBC AUTO-ENTMCNC: 33.6 G/DL (ref 31–37)
MCV RBC AUTO: 94.9 FL (ref 80–100)
MONOCYTES # BLD: 12 % (ref 2–11)
NRBC AUTOMATED: ABNORMAL PER 100 WBC
PDW BLD-RTO: 13 % (ref 12.5–15.4)
PLATELET # BLD: 117 K/UL (ref 140–450)
PLATELET ESTIMATE: ABNORMAL
PMV BLD AUTO: 9.4 FL (ref 6–12)
POTASSIUM SERPL-SCNC: 4.6 MMOL/L (ref 3.7–5.3)
PRO-BNP: 264 PG/ML
RBC # BLD: 4.77 M/UL (ref 4.5–5.9)
RBC # BLD: ABNORMAL 10*6/UL
SEG NEUTROPHILS: 71 % (ref 36–66)
SEGMENTED NEUTROPHILS ABSOLUTE COUNT: 5.9 K/UL (ref 1.8–7.7)
SODIUM BLD-SCNC: 140 MMOL/L (ref 135–144)
WBC # BLD: 8.3 K/UL (ref 3.5–11)
WBC # BLD: ABNORMAL 10*3/UL

## 2020-09-10 PROCEDURE — 80048 BASIC METABOLIC PNL TOTAL CA: CPT

## 2020-09-10 PROCEDURE — 36415 COLL VENOUS BLD VENIPUNCTURE: CPT

## 2020-09-10 PROCEDURE — 85379 FIBRIN DEGRADATION QUANT: CPT

## 2020-09-10 PROCEDURE — 85025 COMPLETE CBC W/AUTO DIFF WBC: CPT

## 2020-09-10 PROCEDURE — 83880 ASSAY OF NATRIURETIC PEPTIDE: CPT

## 2020-09-10 PROCEDURE — 71046 X-RAY EXAM CHEST 2 VIEWS: CPT

## 2020-09-10 RX ORDER — ALBUTEROL SULFATE 90 UG/1
2 AEROSOL, METERED RESPIRATORY (INHALATION) EVERY 4 HOURS PRN
Qty: 3 INHALER | Refills: 1 | Status: SHIPPED | OUTPATIENT
Start: 2020-09-10 | End: 2020-10-15

## 2020-09-10 RX ORDER — LEVOFLOXACIN 500 MG/1
500 TABLET, FILM COATED ORAL DAILY
Qty: 10 TABLET | Refills: 0 | Status: SHIPPED | OUTPATIENT
Start: 2020-09-10 | End: 2020-09-20

## 2020-09-10 RX ORDER — PREDNISONE 20 MG/1
20 TABLET ORAL DAILY
Qty: 5 TABLET | Refills: 0 | Status: SHIPPED | OUTPATIENT
Start: 2020-09-10 | End: 2020-09-15

## 2020-09-10 RX ORDER — FUROSEMIDE 20 MG/1
20 TABLET ORAL DAILY
Qty: 10 TABLET | Refills: 1 | Status: SHIPPED | OUTPATIENT
Start: 2020-09-10 | End: 2020-09-22 | Stop reason: SDUPTHER

## 2020-09-10 ASSESSMENT — ENCOUNTER SYMPTOMS
DIARRHEA: 0
RHINORRHEA: 0
ABDOMINAL PAIN: 0
SORE THROAT: 0
SHORTNESS OF BREATH: 1
NAUSEA: 0
EYE DISCHARGE: 0
EYE REDNESS: 0
WHEEZING: 0
VOMITING: 0
COUGH: 0

## 2020-09-10 NOTE — PROGRESS NOTES
and regular rhythm. Heart sounds: Normal heart sounds. Comments: No carotid bruit  Pulmonary:      Effort: Pulmonary effort is normal. No respiratory distress. Breath sounds: Decreased air movement present. Decreased breath sounds present. Comments: Lung sounds severally diminished  Abdominal:      General: Bowel sounds are normal.      Palpations: Abdomen is soft. Musculoskeletal:      Right lower leg: No edema. Left lower leg: No edema. Lymphadenopathy:      Cervical: No cervical adenopathy. Skin:     General: Skin is warm. Findings: No rash. Neurological:      Mental Status: He is alert and oriented to person, place, and time. Motor: No weakness. Gait: Gait normal.   Psychiatric:         Mood and Affect: Mood normal.         Behavior: Behavior normal.         Thought Content: Thought content normal.          Current Outpatient Medications   Medication Sig Dispense Refill    levoFLOXacin (LEVAQUIN) 500 MG tablet Take 1 tablet by mouth daily for 10 days 10 tablet 0    albuterol sulfate HFA (VENTOLIN HFA) 108 (90 Base) MCG/ACT inhaler Inhale 2 puffs into the lungs every 4 hours as needed for Wheezing or Shortness of Breath 3 Inhaler 1    predniSONE (DELTASONE) 20 MG tablet Take 1 tablet by mouth daily for 5 days 5 tablet 0    furosemide (LASIX) 20 MG tablet Take 1 tablet by mouth daily for 10 days 10 tablet 1    loratadine (CLARITIN) 10 MG tablet TAKE 1 TABLET BY MOUTH DAILY 30 tablet 2    metoprolol tartrate (LOPRESSOR) 25 MG tablet TAKE 1 TABLET BY MOUTH TWICE DAILY 60 tablet 3    meloxicam (MOBIC) 15 MG tablet TAKE 1 TABLET BY MOUTH DAILY 30 tablet 3    simvastatin (ZOCOR) 40 MG tablet TAKE 1 TABLET BY MOUTH EVERY NIGHT 90 tablet 2    levothyroxine (SYNTHROID) 112 MCG tablet TAKE 1 TABLET BY MOUTH EVERY DAY 90 tablet 2     No current facility-administered medications for this visit.          No Known Allergies     Past Medical History:   Diagnosis Date    [address] [address]     prostate, thyroid    Hyperlipidemia     Hypertension         ASSESSMENT:  BP (!) 158/80   Pulse 97   Temp 97.7 °F (36.5 °C)   Resp 18   SpO2 96%    After ambulating for 2 minutes BP= 164/88. P = 110, R = 24 and SpO2 = 91%   Diagnosis Orders   1. Shortness of breath  XR CHEST STANDARD (2 VW)    Basic Metabolic Panel    CBC With Auto Differential    Brain Natriuretic Peptide    D-Dimer, Quantitative   2. Dyspnea on exertion  XR CHEST STANDARD (2 VW)    Basic Metabolic Panel    CBC With Auto Differential    Brain Natriuretic Peptide    D-Dimer, Quantitative   3. Pneumonitis  levoFLOXacin (LEVAQUIN) 500 MG tablet    albuterol sulfate HFA (VENTOLIN HFA) 108 (90 Base) MCG/ACT inhaler    predniSONE (DELTASONE) 20 MG tablet   4. Pleural effusion  furosemide (LASIX) 20 MG tablet        Plan:  Sent to hospital for stat CXR, BMP, BNP, CBC with diff, and D-dimer    Chest x-ray showed pleural effusion. Lab work essentially normal for resident's range.     Furosemide 20 mg daily X 10 days  Levofloxacin 500 mg daily X 10 days  Prednisone 20 mg daily X 5 days  Albuterol Sulfate 2 puffs Q 4 hours PRN SOB 74

## 2020-09-22 ENCOUNTER — TELEPHONE (OUTPATIENT)
Dept: PRIMARY CARE CLINIC | Age: 85
End: 2020-09-22

## 2020-09-22 RX ORDER — FUROSEMIDE 20 MG/1
20 TABLET ORAL DAILY
Qty: 30 TABLET | Refills: 3 | Status: SHIPPED | OUTPATIENT
Start: 2020-09-22 | End: 2020-10-15

## 2020-09-22 NOTE — TELEPHONE ENCOUNTER
Pt calling and states that he has left many messages to have Glenn call him back and he never gets a call back. He is asking to have a call when he has a free second.

## 2020-09-28 ENCOUNTER — HOSPITAL ENCOUNTER (OUTPATIENT)
Age: 85
Setting detail: SPECIMEN
Discharge: HOME | End: 2020-09-28
Payer: MEDICARE

## 2020-09-28 ENCOUNTER — TELEPHONE (OUTPATIENT)
Dept: PRIMARY CARE CLINIC | Age: 85
End: 2020-09-28

## 2020-09-28 NOTE — TELEPHONE ENCOUNTER
Shortness of breath is getting worse. Walking across the room will him short of breath and panting. The last couple of week it has gotten worse  No swelling. Running nose. Cough more than he did a week ago. A dry cough. He is his wife'scare giver and she had dementia. Very concerned if he needs to go any where or is incapacitated who will help her meet her needs. CXR and covid testing ordered. I will see him on Wednesday AM.  I will need to talk to Donna Lemus, [place] director, about help for Earle Ornelaskathryn.

## 2020-09-29 ENCOUNTER — HOSPITAL ENCOUNTER (OUTPATIENT)
Dept: GENERAL RADIOLOGY | Age: 85
Discharge: HOME | End: 2020-10-01
Payer: MEDICARE

## 2020-09-29 ENCOUNTER — OFFICE VISIT (OUTPATIENT)
Dept: PRIMARY CARE CLINIC | Age: 85
End: 2020-09-29
Payer: MEDICARE

## 2020-09-29 ENCOUNTER — HOSPITAL ENCOUNTER (OUTPATIENT)
Age: 85
Discharge: HOME | End: 2020-10-01
Payer: MEDICARE

## 2020-09-29 PROCEDURE — G8417 CALC BMI ABV UP PARAM F/U: HCPCS | Performed by: PHYSICIAN ASSISTANT

## 2020-09-29 PROCEDURE — 71046 X-RAY EXAM CHEST 2 VIEWS: CPT

## 2020-09-29 PROCEDURE — 99211 OFF/OP EST MAY X REQ PHY/QHP: CPT | Performed by: PHYSICIAN ASSISTANT

## 2020-09-29 PROCEDURE — G8428 CUR MEDS NOT DOCUMENT: HCPCS | Performed by: PHYSICIAN ASSISTANT

## 2020-09-30 ENCOUNTER — HOSPITAL ENCOUNTER (INPATIENT)
Age: 85
LOS: 2 days | Discharge: HOME | DRG: 180 | End: 2020-10-02
Attending: EMERGENCY MEDICINE | Admitting: INTERNAL MEDICINE
Payer: MEDICARE

## 2020-09-30 ENCOUNTER — OUTSIDE SERVICES (OUTPATIENT)
Dept: PRIMARY CARE CLINIC | Age: 85
End: 2020-09-30

## 2020-09-30 ENCOUNTER — APPOINTMENT (OUTPATIENT)
Dept: CT IMAGING | Age: 85
DRG: 180 | End: 2020-09-30
Payer: MEDICARE

## 2020-09-30 VITALS
OXYGEN SATURATION: 92 % | SYSTOLIC BLOOD PRESSURE: 112 MMHG | TEMPERATURE: 97.8 F | DIASTOLIC BLOOD PRESSURE: 66 MMHG | HEART RATE: 70 BPM | RESPIRATION RATE: 24 BRPM

## 2020-09-30 PROBLEM — R91.8 LUNG MASS: Status: ACTIVE | Noted: 2020-09-30

## 2020-09-30 PROBLEM — J90 PLEURAL EFFUSION: Status: ACTIVE | Noted: 2020-09-30

## 2020-09-30 PROBLEM — I10 ESSENTIAL HYPERTENSION: Status: ACTIVE | Noted: 2018-09-28

## 2020-09-30 PROBLEM — Z85.850 HISTORY OF MALIGNANT NEOPLASM OF THYROID: Status: ACTIVE | Noted: 2017-06-20

## 2020-09-30 LAB
ABSOLUTE EOS #: 0.1 K/UL (ref 0–0.4)
ABSOLUTE IMMATURE GRANULOCYTE: ABNORMAL K/UL (ref 0–0.3)
ABSOLUTE LYMPH #: 1 K/UL (ref 1–4.8)
ABSOLUTE MONO #: 0.8 K/UL (ref 0.1–1.2)
ANION GAP SERPL CALCULATED.3IONS-SCNC: 12 MMOL/L (ref 9–17)
BASOPHILS # BLD: 1 % (ref 0–2)
BASOPHILS ABSOLUTE: 0 K/UL (ref 0–0.2)
BNP INTERPRETATION: ABNORMAL
BUN BLDV-MCNC: 19 MG/DL (ref 8–23)
BUN/CREAT BLD: ABNORMAL (ref 9–20)
CALCIUM SERPL-MCNC: 9.5 MG/DL (ref 8.6–10.4)
CHLORIDE BLD-SCNC: 102 MMOL/L (ref 98–107)
CO2: 29 MMOL/L (ref 20–31)
CREAT SERPL-MCNC: 0.79 MG/DL (ref 0.7–1.2)
DIFFERENTIAL TYPE: ABNORMAL
EOSINOPHILS RELATIVE PERCENT: 2 % (ref 1–4)
GFR AFRICAN AMERICAN: >60 ML/MIN
GFR NON-AFRICAN AMERICAN: >60 ML/MIN
GFR SERPL CREATININE-BSD FRML MDRD: ABNORMAL ML/MIN/{1.73_M2}
GFR SERPL CREATININE-BSD FRML MDRD: ABNORMAL ML/MIN/{1.73_M2}
GLUCOSE BLD-MCNC: 108 MG/DL (ref 70–99)
HCT VFR BLD CALC: 46.4 % (ref 41–53)
HEMOGLOBIN: 15.5 G/DL (ref 13.5–17.5)
IMMATURE GRANULOCYTES: ABNORMAL %
INR BLD: 1.1
LYMPHOCYTES # BLD: 14 % (ref 24–44)
MCH RBC QN AUTO: 31.4 PG (ref 26–34)
MCHC RBC AUTO-ENTMCNC: 33.4 G/DL (ref 31–37)
MCV RBC AUTO: 94 FL (ref 80–100)
MONOCYTES # BLD: 11 % (ref 2–11)
NRBC AUTOMATED: ABNORMAL PER 100 WBC
PARTIAL THROMBOPLASTIN TIME: 27.6 SEC (ref 21.3–31.3)
PDW BLD-RTO: 12.8 % (ref 12.5–15.4)
PLATELET # BLD: 166 K/UL (ref 140–450)
PLATELET ESTIMATE: ABNORMAL
PMV BLD AUTO: 9.2 FL (ref 6–12)
POTASSIUM SERPL-SCNC: 3.9 MMOL/L (ref 3.7–5.3)
PRO-BNP: 384 PG/ML
PROTHROMBIN TIME: 11.1 SEC (ref 9.4–12.6)
RBC # BLD: 4.94 M/UL (ref 4.5–5.9)
RBC # BLD: ABNORMAL 10*6/UL
SARS-COV-2, RAPID: NOT DETECTED
SARS-COV-2: NORMAL
SARS-COV-2: NORMAL
SEG NEUTROPHILS: 72 % (ref 36–66)
SEGMENTED NEUTROPHILS ABSOLUTE COUNT: 5.4 K/UL (ref 1.8–7.7)
SODIUM BLD-SCNC: 143 MMOL/L (ref 135–144)
SOURCE: NORMAL
TROPONIN INTERP: NORMAL
TROPONIN T: NORMAL NG/ML
TROPONIN, HIGH SENSITIVITY: 14 NG/L (ref 0–22)
WBC # BLD: 7.4 K/UL (ref 3.5–11)
WBC # BLD: ABNORMAL 10*3/UL

## 2020-09-30 PROCEDURE — 71260 CT THORAX DX C+: CPT

## 2020-09-30 PROCEDURE — 99285 EMERGENCY DEPT VISIT HI MDM: CPT

## 2020-09-30 PROCEDURE — 36415 COLL VENOUS BLD VENIPUNCTURE: CPT

## 2020-09-30 PROCEDURE — 2580000003 HC RX 258: Performed by: CLINICAL NURSE SPECIALIST

## 2020-09-30 PROCEDURE — 2580000003 HC RX 258: Performed by: EMERGENCY MEDICINE

## 2020-09-30 PROCEDURE — 0W993ZZ DRAINAGE OF RIGHT PLEURAL CAVITY, PERCUTANEOUS APPROACH: ICD-10-PCS | Performed by: RADIOLOGY

## 2020-09-30 PROCEDURE — 93005 ELECTROCARDIOGRAM TRACING: CPT | Performed by: EMERGENCY MEDICINE

## 2020-09-30 PROCEDURE — 85730 THROMBOPLASTIN TIME PARTIAL: CPT

## 2020-09-30 PROCEDURE — 84484 ASSAY OF TROPONIN QUANT: CPT

## 2020-09-30 PROCEDURE — 83880 ASSAY OF NATRIURETIC PEPTIDE: CPT

## 2020-09-30 PROCEDURE — 99223 1ST HOSP IP/OBS HIGH 75: CPT | Performed by: NURSE PRACTITIONER

## 2020-09-30 PROCEDURE — 85025 COMPLETE CBC W/AUTO DIFF WBC: CPT

## 2020-09-30 PROCEDURE — 85610 PROTHROMBIN TIME: CPT

## 2020-09-30 PROCEDURE — 1200000000 HC SEMI PRIVATE

## 2020-09-30 PROCEDURE — 6370000000 HC RX 637 (ALT 250 FOR IP): Performed by: CLINICAL NURSE SPECIALIST

## 2020-09-30 PROCEDURE — 80048 BASIC METABOLIC PNL TOTAL CA: CPT

## 2020-09-30 PROCEDURE — U0002 COVID-19 LAB TEST NON-CDC: HCPCS

## 2020-09-30 PROCEDURE — 6360000004 HC RX CONTRAST MEDICATION: Performed by: EMERGENCY MEDICINE

## 2020-09-30 RX ORDER — LEVOTHYROXINE SODIUM 112 UG/1
112 TABLET ORAL DAILY
Status: DISCONTINUED | OUTPATIENT
Start: 2020-10-01 | End: 2020-10-02 | Stop reason: HOSPADM

## 2020-09-30 RX ORDER — FUROSEMIDE 20 MG/1
20 TABLET ORAL DAILY
Status: DISCONTINUED | OUTPATIENT
Start: 2020-10-01 | End: 2020-10-02 | Stop reason: HOSPADM

## 2020-09-30 RX ORDER — LEVOFLOXACIN 500 MG/1
500 TABLET, FILM COATED ORAL DAILY
Qty: 10 TABLET | Refills: 0 | Status: ON HOLD | OUTPATIENT
Start: 2020-09-30 | End: 2020-10-02 | Stop reason: HOSPADM

## 2020-09-30 RX ORDER — SODIUM CHLORIDE 0.9 % (FLUSH) 0.9 %
10 SYRINGE (ML) INJECTION PRN
Status: DISCONTINUED | OUTPATIENT
Start: 2020-09-30 | End: 2020-10-02 | Stop reason: HOSPADM

## 2020-09-30 RX ORDER — POTASSIUM CHLORIDE 7.45 MG/ML
10 INJECTION INTRAVENOUS PRN
Status: DISCONTINUED | OUTPATIENT
Start: 2020-09-30 | End: 2020-10-02 | Stop reason: HOSPADM

## 2020-09-30 RX ORDER — ATORVASTATIN CALCIUM 20 MG/1
20 TABLET, FILM COATED ORAL DAILY
Status: DISCONTINUED | OUTPATIENT
Start: 2020-10-01 | End: 2020-10-02 | Stop reason: HOSPADM

## 2020-09-30 RX ORDER — ONDANSETRON 2 MG/ML
4 INJECTION INTRAMUSCULAR; INTRAVENOUS EVERY 6 HOURS PRN
Status: DISCONTINUED | OUTPATIENT
Start: 2020-09-30 | End: 2020-10-02 | Stop reason: HOSPADM

## 2020-09-30 RX ORDER — ACETAMINOPHEN 325 MG/1
650 TABLET ORAL EVERY 6 HOURS PRN
Status: DISCONTINUED | OUTPATIENT
Start: 2020-09-30 | End: 2020-10-02 | Stop reason: HOSPADM

## 2020-09-30 RX ORDER — ALBUTEROL SULFATE 2.5 MG/3ML
2.5 SOLUTION RESPIRATORY (INHALATION) EVERY 4 HOURS PRN
Status: DISCONTINUED | OUTPATIENT
Start: 2020-09-30 | End: 2020-10-02 | Stop reason: HOSPADM

## 2020-09-30 RX ORDER — POTASSIUM CHLORIDE 20 MEQ/1
40 TABLET, EXTENDED RELEASE ORAL PRN
Status: DISCONTINUED | OUTPATIENT
Start: 2020-09-30 | End: 2020-10-02 | Stop reason: HOSPADM

## 2020-09-30 RX ORDER — ACETAMINOPHEN 650 MG/1
650 SUPPOSITORY RECTAL EVERY 6 HOURS PRN
Status: DISCONTINUED | OUTPATIENT
Start: 2020-09-30 | End: 2020-10-02 | Stop reason: HOSPADM

## 2020-09-30 RX ORDER — CETIRIZINE HYDROCHLORIDE 10 MG/1
10 TABLET ORAL DAILY
Status: DISCONTINUED | OUTPATIENT
Start: 2020-10-01 | End: 2020-10-02 | Stop reason: HOSPADM

## 2020-09-30 RX ORDER — POLYETHYLENE GLYCOL 3350 17 G/17G
17 POWDER, FOR SOLUTION ORAL DAILY PRN
Status: DISCONTINUED | OUTPATIENT
Start: 2020-09-30 | End: 2020-10-02 | Stop reason: HOSPADM

## 2020-09-30 RX ORDER — MAGNESIUM SULFATE 1 G/100ML
1 INJECTION INTRAVENOUS PRN
Status: DISCONTINUED | OUTPATIENT
Start: 2020-09-30 | End: 2020-10-02 | Stop reason: HOSPADM

## 2020-09-30 RX ORDER — MELOXICAM 7.5 MG/1
15 TABLET ORAL DAILY
Status: DISCONTINUED | OUTPATIENT
Start: 2020-10-01 | End: 2020-10-02 | Stop reason: HOSPADM

## 2020-09-30 RX ORDER — SODIUM CHLORIDE 0.9 % (FLUSH) 0.9 %
10 SYRINGE (ML) INJECTION EVERY 12 HOURS SCHEDULED
Status: DISCONTINUED | OUTPATIENT
Start: 2020-09-30 | End: 2020-10-02 | Stop reason: HOSPADM

## 2020-09-30 RX ORDER — 0.9 % SODIUM CHLORIDE 0.9 %
80 INTRAVENOUS SOLUTION INTRAVENOUS ONCE
Status: COMPLETED | OUTPATIENT
Start: 2020-09-30 | End: 2020-09-30

## 2020-09-30 RX ORDER — PROMETHAZINE HYDROCHLORIDE 12.5 MG/1
12.5 TABLET ORAL EVERY 6 HOURS PRN
Status: DISCONTINUED | OUTPATIENT
Start: 2020-09-30 | End: 2020-10-02 | Stop reason: HOSPADM

## 2020-09-30 RX ADMIN — Medication 10 ML: at 16:35

## 2020-09-30 RX ADMIN — METOPROLOL TARTRATE 25 MG: 25 TABLET ORAL at 23:18

## 2020-09-30 RX ADMIN — IOHEXOL 75 ML: 350 INJECTION, SOLUTION INTRAVENOUS at 16:34

## 2020-09-30 RX ADMIN — Medication 10 ML: at 22:02

## 2020-09-30 RX ADMIN — SODIUM CHLORIDE 80 ML: 9 INJECTION, SOLUTION INTRAVENOUS at 16:35

## 2020-09-30 ASSESSMENT — ENCOUNTER SYMPTOMS
SHORTNESS OF BREATH: 1
SORE THROAT: 0
VOICE CHANGE: 0
DIARRHEA: 0
COUGH: 1
EYE DISCHARGE: 0
TROUBLE SWALLOWING: 0
RHINORRHEA: 0
DIARRHEA: 0
NAUSEA: 0
ABDOMINAL DISTENTION: 0
ABDOMINAL PAIN: 0
EYE REDNESS: 0
NAUSEA: 0
WHEEZING: 0
CONSTIPATION: 0
BACK PAIN: 0
ABDOMINAL PAIN: 0
SHORTNESS OF BREATH: 1
CHEST TIGHTNESS: 0
STRIDOR: 0
BACK PAIN: 1
VOMITING: 0
SORE THROAT: 0
COLOR CHANGE: 0
WHEEZING: 0
COUGH: 1
VOMITING: 0
PHOTOPHOBIA: 0

## 2020-09-30 ASSESSMENT — PAIN SCALES - GENERAL: PAINLEVEL_OUTOF10: 0

## 2020-09-30 NOTE — ED NOTES
Pt to room 8 via w/c. C/o SOB x 2-3 weeks. Pt reports he has been evaluated by his PCP and was sent for CXR yesterday that showed a pleural effusion to rt lung that had increased since his last CXR. Pt reports he was given an order for outpatient CT scan for today and reports he came to ED for test but became winded when walking into ED. Pt reports he felt he needed to be evaluated further for his SOB and could not wait for eval. Pt lung sounds evaluated and noted to be markedly diminished to RLL of lung. Pt noted to be winded with exertion. Pt speaking sentences no distress noted. Pt alert and oriented, denies any fevers/chills.       Ferny Gill RN  09/30/20 [id number]

## 2020-09-30 NOTE — ED NOTES
Marek 24, hospitalist paged at [address] per Dr. Genevieve Daniels request     Roscoe Venegas, LEXI  09/30/20 [id number]

## 2020-09-30 NOTE — ED PROVIDER NOTES
[id number] [facility]  [address] [facility] [address]  Phone: [phone]  Fax: [phone]        Pt Name: Yanira Jose  MRN: [id number]  [place] 7/2/1932  Date of evaluation: 9/30/20      CHIEF COMPLAINT     Chief Complaint   Patient presents with    Shortness of Breath         HISTORY OF PRESENT ILLNESS  (Location/Symptom, Timing/Onset, Context/Setting, Quality, Duration, Modifying Factors, Severity.)    Yanira Jose is a 80 y.o. male who presents with shortness of breath. This 80-year-old male states he had an x-ray for shortness of breath was advised by his family doctor to return to the ER for CT scan of the chest the patient dates over the last few weeks he has had shortness of breath with any exertion he has an occasional cough no fever no chills no chest pain no unusual swelling of the arms or legs exertion makes his symptoms worse      REVIEW OF SYSTEMS    (2-9 systems for level 4, 10 or more for level 5)     Review of Systems   Respiratory: Positive for cough and shortness of breath. All other systems reviewed and are negative. PAST MEDICAL HISTORY    has a past medical history of Cancer ([address] Hyperlipidemia, and Hypertension. SURGICAL HISTORY      has a past surgical history that includes Prostatectomy; Thyroidectomy; Carpal tunnel release; and Mouth surgery.     CURRENTMEDICATIONS       Previous Medications    ALBUTEROL SULFATE HFA (VENTOLIN HFA) 108 (90 BASE) MCG/ACT INHALER    Inhale 2 puffs into the lungs every 4 hours as needed for Wheezing or Shortness of Breath    FUROSEMIDE (LASIX) 20 MG TABLET    Take 1 tablet by mouth daily    LEVOFLOXACIN (LEVAQUIN) 500 MG TABLET    Take 1 tablet by mouth daily for 10 days    LEVOTHYROXINE (SYNTHROID) 112 MCG TABLET    TAKE 1 TABLET BY MOUTH EVERY DAY    LORATADINE (CLARITIN) 10 MG TABLET    TAKE 1 TABLET BY MOUTH DAILY    MELOXICAM (MOBIC) 15 MG TABLET    TAKE 1 TABLET BY MOUTH DAILY    METOPROLOL TARTRATE (LOPRESSOR) 25 MG TABLET    TAKE 1 TABLET BY MOUTH TWICE DAILY    SIMVASTATIN (ZOCOR) 40 MG TABLET    TAKE 1 TABLET BY MOUTH EVERY NIGHT       ALLERGIES     has No Known Allergies. FAMILY HISTORY     He indicated that the status of his father is unknown. He indicated that the status of his brother is unknown.     family history includes Diabetes in his brother; Prostate Cancer in his father. SOCIAL HISTORY      reports that he quit smoking about 51 years ago. His smoking use included cigarettes. He has a 20.00 pack-year smoking history. He has never used smokeless tobacco. He reports current alcohol use of about 16.0 standard drinks of alcohol per week. He reports that he does not use drugs. PHYSICAL EXAM    (up to 7 for level 4, 8 or more for level 5)   INITIAL VITALS:  temporal temperature is 98.3 °F (36.8 °C). His blood pressure is 175/64 (abnormal) and his pulse is 90. His respiration is 20 and oxygen saturation is 95%. Physical Exam  Vitals signs and nursing note reviewed. Constitutional:       Appearance: Normal appearance. HENT:      Head: Normocephalic and atraumatic. Eyes:      Conjunctiva/sclera: Conjunctivae normal.   Neck:      Musculoskeletal: Normal range of motion and neck supple. No neck rigidity or muscular tenderness. Cardiovascular:      Rate and Rhythm: Normal rate and regular rhythm. Pulmonary:      Comments: Diminished breath sounds on the right side  Abdominal:      General: There is no distension. Palpations: Abdomen is soft. Tenderness: There is no abdominal tenderness. There is no guarding. Musculoskeletal: Normal range of motion. General: No swelling or tenderness. Comments: No calf swelling or tenderness appreciated   Lymphadenopathy:      Cervical: No cervical adenopathy. Skin:     General: Skin is warm and dry. Findings: No rash. Neurological:      General: No focal deficit present. Mental Status: He is alert. HISTORY: ORDERING SYSTEM PROVIDED HISTORY: Shortness of breath TECHNOLOGIST PROVIDED HISTORY: progressive shortness of breath Reason for Exam: progressive shortness of breath Acuity: Acute Type of Exam: Initial Relevant Medical/Surgical History: hx of prostate and thyroid cancer, HTN FINDINGS: There is a small to moderate right pleural effusion with atelectasis. There is left basilar scarring. The cardiac silhouette is within normal limits. There is no pneumothorax. 1. Small to moderate right pleural effusion. Interpretation per the Radiologist below, if available at the time of this note:    CT CHEST PULMONARY EMBOLISM W CONTRAST (Preliminary result)   Result time 09/30/20 17:05:40   Preliminary result by Randi Bone MD (09/30/20 17:05:40)                 Impression:     No central or segmental pulmonary embolus. Large right pleural effusion. Multiple masses within the right hemithorax which appear to be pleural based   and along the pericardium.  Metastatic disease is suspected. Narrative:     EXAMINATION:   CTA OF THE CHEST 9/30/2020 4:27 pm     TECHNIQUE:   CTA of the chest was performed after the administration of intravenous   contrast.  Multiplanar reformatted images are provided for review.  MIP   images are provided for review. Dose modulation, iterative reconstruction,   and/or weight based adjustment of the mA/kV was utilized to reduce the   radiation dose to as low as reasonably achievable. COMPARISON:   Chest radiograph September 29, 2020     HISTORY:   ORDERING SYSTEM PROVIDED HISTORY: Dyspnea   TECHNOLOGIST PROVIDED HISTORY:   Dyspnea   Reason for Exam:  Recent SOB.  Hx of HTN, prostate and thyroid cancer   Acuity: Acute   Type of Exam: Initial     FINDINGS:   Chest wall: No axillary adenopathy.  Thyroidectomy. Upper Abdomen: No adrenal nodule.  Cyst within the caudate.      Mediastinum: Mild cardiomegaly.  No pericardial effusion.  Multiple enlarged pericardial lymph nodes, for example series 5 image 194 there is a 17 mm   nodule.  No mediastinal or hilar adenopathy. Pulmonary Arteries: No central or segmental pulmonary embolus. Lungs: Large right pleural effusion.  No left pleural effusion.  Right lower   lobe opacity may represent atelectasis.  Multiple pleural-based masses are   seen along the right upper lobe and right lung apex.  Along the pericardium   and pleural surface within the middle lobe there is more focal thickening and   nodularity.  For example refer to series 5, image 177 where there is a   conglomerate mass measuring 2.5 cm. Bones: Thoracic spine degenerative changes.  No suspicious osseous findings.                  Preliminary result by Della Donohue MD (09/30/20 17:00:51)                 Impression:     No central or segmental pulmonary embolus. Large right pleural effusion. Multiple masses within the right hemithorax which appear to be pleural based   and along the pericardium.  Metastatic disease is suspected.                    LABS:  Results for orders placed or performed during the hospital encounter of 07/50/17   Basic Metabolic Panel   Result Value Ref Range    Glucose 108 (H) 70 - 99 mg/dL    BUN 19 8 - 23 mg/dL    CREATININE 0.79 0.70 - 1.20 mg/dL    Bun/Cre Ratio NOT REPORTED 9 - 20    Calcium 9.5 8.6 - 10.4 mg/dL    Sodium 143 135 - 144 mmol/L    Potassium 3.9 3.7 - 5.3 mmol/L    Chloride 102 98 - 107 mmol/L    CO2 29 20 - 31 mmol/L    Anion Gap 12 9 - 17 mmol/L    GFR Non-African American >60 >60 mL/min    GFR African American >60 >60 mL/min    GFR Comment          GFR Staging NOT REPORTED    Protime-INR   Result Value Ref Range    Protime 11.1 9.4 - 12.6 sec    INR 1.1    APTT   Result Value Ref Range    PTT 27.6 21.3 - 31.3 sec   CBC Auto Differential   Result Value Ref Range    WBC 7.4 3.5 - 11.0 k/uL    RBC 4.94 4.5 - 5.9 m/uL    Hemoglobin 15.5 13.5 - 17.5 g/dL    Hematocrit 46.4 41 - 53 %    MCV disease I discussed this with my hospitalist here at [facility] they are requesting that the patient be sent to [facility] where interventional radiology and pulmonary can perform a pleurodesis or thoracentesis and further work-up the possible cancer I discussed all of this with the patient and he is agreeable to the admission to [facility] so I will contact the hospitalist for admission    CONSULTS:  My hospitalist is kind enough to admit the patient to her service. PROCEDURES:  None    FINAL IMPRESSION      1. Dyspnea, unspecified type    2. Pleural effusion on right          DISPOSITION/PLAN   DISPOSITION        CONDITION ON DISPOSITION:   Stable    PATIENT REFERRED TO:  No follow-up provider specified. DISCHARGE MEDICATIONS:  New Prescriptions    No medications on file       (Please note that portions of this note were completed with a voicerecognition program.  Efforts were made to edit the dictations but occasionally words are mis-transcribed.)    Pastor MD, F.A.C.E.P.   Attending Emergency Medicine Physician       Louisa Mason MD  09/30/20 [id number]

## 2020-10-01 ENCOUNTER — APPOINTMENT (OUTPATIENT)
Dept: ULTRASOUND IMAGING | Age: 85
DRG: 180 | End: 2020-10-01
Payer: MEDICARE

## 2020-10-01 LAB
AFP: 3.2 UG/L
ALBUMIN SERPL-MCNC: 3.3 G/DL (ref 3.5–5.2)
ALBUMIN/GLOBULIN RATIO: 1.4 (ref 1–2.5)
ALP BLD-CCNC: 47 U/L (ref 40–129)
ALT SERPL-CCNC: 10 U/L (ref 5–41)
ANION GAP SERPL CALCULATED.3IONS-SCNC: 12 MMOL/L (ref 9–17)
AST SERPL-CCNC: 18 U/L
BILIRUB SERPL-MCNC: 0.59 MG/DL (ref 0.3–1.2)
BUN BLDV-MCNC: 18 MG/DL (ref 8–23)
BUN/CREAT BLD: ABNORMAL (ref 9–20)
CA 19-9: 45 U/ML (ref 0–35)
CALCIUM SERPL-MCNC: 8.8 MG/DL (ref 8.6–10.4)
CARCINOEMBRYONIC ANTIGEN: 0.9 NG/ML
CASE NUMBER:: NORMAL
CHLORIDE BLD-SCNC: 106 MMOL/L (ref 98–107)
CO2: 26 MMOL/L (ref 20–31)
CREAT SERPL-MCNC: 0.61 MG/DL (ref 0.7–1.2)
DIRECT EXAM: NORMAL
EKG ATRIAL RATE: 85 BPM
EKG P AXIS: -16 DEGREES
EKG P-R INTERVAL: 116 MS
EKG Q-T INTERVAL: 378 MS
EKG QRS DURATION: 80 MS
EKG QTC CALCULATION (BAZETT): 439 MS
EKG R AXIS: 66 DEGREES
EKG T AXIS: 92 DEGREES
EKG VENTRICULAR RATE: 81 BPM
GFR AFRICAN AMERICAN: >60 ML/MIN
GFR NON-AFRICAN AMERICAN: >60 ML/MIN
GFR SERPL CREATININE-BSD FRML MDRD: ABNORMAL ML/MIN/{1.73_M2}
GFR SERPL CREATININE-BSD FRML MDRD: ABNORMAL ML/MIN/{1.73_M2}
GLUCOSE BLD-MCNC: 91 MG/DL (ref 70–99)
GLUCOSE, FLUID: 100 MG/DL
HCT VFR BLD CALC: 46.2 % (ref 40.7–50.3)
HEMOGLOBIN: 14.2 G/DL (ref 13–17)
INR BLD: 1
LACTATE DEHYDROGENASE, FLUID: 183 U/L
LACTATE DEHYDROGENASE: 252 U/L (ref 135–225)
Lab: NORMAL
MCH RBC QN AUTO: 31.1 PG (ref 25.2–33.5)
MCHC RBC AUTO-ENTMCNC: 30.7 G/DL (ref 28.4–34.8)
MCV RBC AUTO: 101.3 FL (ref 82.6–102.9)
NRBC AUTOMATED: 0 PER 100 WBC
PDW BLD-RTO: 12 % (ref 11.8–14.4)
PLATELET # BLD: 148 K/UL (ref 138–453)
PMV BLD AUTO: 11.1 FL (ref 8.1–13.5)
POTASSIUM SERPL-SCNC: 4.4 MMOL/L (ref 3.7–5.3)
PROSTATE SPECIFIC ANTIGEN: 2.3 UG/L
PROTHROMBIN TIME: 10.8 SEC (ref 9–12)
RBC # BLD: 4.56 M/UL (ref 4.21–5.77)
SARS-COV-2, NAA: NOT DETECTED
SODIUM BLD-SCNC: 144 MMOL/L (ref 135–144)
SPECIMEN DESCRIPTION: NORMAL
SPECIMEN DESCRIPTION: NORMAL
SPECIMEN TYPE: NORMAL
TOTAL PROTEIN, BODY FLUID: 3.7 G/DL
TOTAL PROTEIN: 5.7 G/DL (ref 6.4–8.3)
TSH SERPL DL<=0.05 MIU/L-ACNC: 0.3 MIU/L (ref 0.3–5)
WBC # BLD: 7.8 K/UL (ref 3.5–11.3)

## 2020-10-01 PROCEDURE — 87075 CULTR BACTERIA EXCEPT BLOOD: CPT

## 2020-10-01 PROCEDURE — 36415 COLL VENOUS BLD VENIPUNCTURE: CPT

## 2020-10-01 PROCEDURE — 82945 GLUCOSE OTHER FLUID: CPT

## 2020-10-01 PROCEDURE — 86301 IMMUNOASSAY TUMOR CA 19-9: CPT

## 2020-10-01 PROCEDURE — 85610 PROTHROMBIN TIME: CPT

## 2020-10-01 PROCEDURE — 6370000000 HC RX 637 (ALT 250 FOR IP): Performed by: CLINICAL NURSE SPECIALIST

## 2020-10-01 PROCEDURE — 1200000000 HC SEMI PRIVATE

## 2020-10-01 PROCEDURE — 84311 SPECTROPHOTOMETRY: CPT

## 2020-10-01 PROCEDURE — 84157 ASSAY OF PROTEIN OTHER: CPT

## 2020-10-01 PROCEDURE — 84443 ASSAY THYROID STIM HORMONE: CPT

## 2020-10-01 PROCEDURE — 88112 CYTOPATH CELL ENHANCE TECH: CPT

## 2020-10-01 PROCEDURE — 88342 IMHCHEM/IMCYTCHM 1ST ANTB: CPT

## 2020-10-01 PROCEDURE — 82378 CARCINOEMBRYONIC ANTIGEN: CPT

## 2020-10-01 PROCEDURE — 99222 1ST HOSP IP/OBS MODERATE 55: CPT | Performed by: INTERNAL MEDICINE

## 2020-10-01 PROCEDURE — 88341 IMHCHEM/IMCYTCHM EA ADD ANTB: CPT

## 2020-10-01 PROCEDURE — 80053 COMPREHEN METABOLIC PANEL: CPT

## 2020-10-01 PROCEDURE — 87070 CULTURE OTHR SPECIMN AEROBIC: CPT

## 2020-10-01 PROCEDURE — 89051 BODY FLUID CELL COUNT: CPT

## 2020-10-01 PROCEDURE — 82105 ALPHA-FETOPROTEIN SERUM: CPT

## 2020-10-01 PROCEDURE — 2700000000 HC OXYGEN THERAPY PER DAY

## 2020-10-01 PROCEDURE — C1729 CATH, DRAINAGE: HCPCS

## 2020-10-01 PROCEDURE — G0103 PSA SCREENING: HCPCS

## 2020-10-01 PROCEDURE — 2580000003 HC RX 258: Performed by: CLINICAL NURSE SPECIALIST

## 2020-10-01 PROCEDURE — 87205 SMEAR GRAM STAIN: CPT

## 2020-10-01 PROCEDURE — 99232 SBSQ HOSP IP/OBS MODERATE 35: CPT | Performed by: INTERNAL MEDICINE

## 2020-10-01 PROCEDURE — 88305 TISSUE EXAM BY PATHOLOGIST: CPT

## 2020-10-01 PROCEDURE — 85027 COMPLETE CBC AUTOMATED: CPT

## 2020-10-01 PROCEDURE — 83615 LACTATE (LD) (LDH) ENZYME: CPT

## 2020-10-01 RX ADMIN — FUROSEMIDE 20 MG: 20 TABLET ORAL at 09:04

## 2020-10-01 RX ADMIN — Medication 10 ML: at 09:06

## 2020-10-01 RX ADMIN — CETIRIZINE HYDROCHLORIDE 10 MG: 10 TABLET ORAL at 09:04

## 2020-10-01 RX ADMIN — MELOXICAM 15 MG: 7.5 TABLET ORAL at 09:04

## 2020-10-01 RX ADMIN — Medication 10 ML: at 19:48

## 2020-10-01 RX ADMIN — LEVOTHYROXINE SODIUM 112 MCG: 112 TABLET ORAL at 09:04

## 2020-10-01 RX ADMIN — ATORVASTATIN CALCIUM 20 MG: 20 TABLET, FILM COATED ORAL at 09:04

## 2020-10-01 RX ADMIN — METOPROLOL TARTRATE 25 MG: 25 TABLET ORAL at 19:48

## 2020-10-01 ASSESSMENT — PAIN SCALES - GENERAL
PAINLEVEL_OUTOF10: 0

## 2020-10-01 NOTE — BRIEF OP NOTE
Brief Postoperative Note for Thoracentesis    Bertin Hodge  YOB: 1932  [id number]    Pre-operative Diagnosis: right Pleural effusion      Post-operative Diagnosis: Same    Procedure: Ultrasound guided Thoracentesis     Anesthesia: 1% Lidocaine     Surgeons/Assistants: Deepak Gonzalez PA-C    Complications: none    EBL: Minimal    Specimens: were obtained    Ultrasound guided right thoracentesis performed. 1500 ml serosanguinous obtained. Dressing applied.       Electronically signed by Chrys Sandifer, PA on 10/1/2020 at 3:17 PM

## 2020-10-01 NOTE — CARE COORDINATION
Case Management Initial Discharge Plan  Marija Hawthorne             Met with:patient to discuss discharge plans. Information verified: address, contacts, phone number, , insurance Yes    Emergency Contact/Next of Kin name & number:   David Almaguer    PCP: LO Amaya CNP  Date of last visit: 2020    Insurance Provider: [facility]    Discharge Planning    Living Arrangements:  Spouse/Significant Other   Support Systems:  [id number] has 1 story  0 stairs to climb to get into front door    Patient able to perform ADL's:Independent    Current Services (outpatient & in home) none  DME equipment: none  DME provider: n/a    Receiving oral anticoagulation therapy? No    If indicated:   Physician managing anticoagulation treatment: n/a  Where does patient obtain lab work for ATC treatment? n/a      Potential Assistance Needed:  N/A    Patient agreeable to home care: No  [place] of choice provided:  n/a    Prior SNF/Rehab Placement and Facility: none  Agreeable to SNF/Rehab: No  [place] of choice provided: n/a     Evaluation: n/a    Expected Discharge date:  10/04/20    Patient expects to be discharged to:  Home  Follow Up Appointment: Best Day/ Time: Thursday AM    Transportation provider: children  Transportation arrangements needed for discharge: No    Readmission Risk              Risk of Unplanned Readmission:        7             Does patient have a readmission risk score greater than 14?: No  If yes, follow-up appointment must be made within 7 days of discharge. Goals of Care: Breathe easier      Discharge Plan: Goal is home independent, pt states he takes care of his wife who has dementia.           Electronically signed by Ari Brown RN on 10/1/20 at 9:08 AM EDT

## 2020-10-01 NOTE — CONSULTS
PULMONARY & CRITICAL CARE MEDICINE CONSULT NOTE     Patient:  Dre Darling  MRN: [id number]  Admit date: 9/30/2020  Primary Care Physician: LO Carrasquillo - CNP  Consulting Physician: Edy Brown MD  CODE Status: Full Code     HISTORY     CHIEF COMPLAINT/REASON FOR CONSULT: pleural effusion. HISTORY OF PRESENT ILLNESS:  The patient is a 80 y.o. male with a past medical history of HLD, HTN, prostate/thyroid cancer ? initially presented to his PCP office with shortness of breath on 9/10/20. Chief complaint of dyspnea x 2-3 weeks. CXR at the time showed small to moderate right pleural effusion. Patient was treated with diuretics and levaquin without improvement. Repeat CXR on 9/29/20 showed interval increase with moderate right basilar opacity with opacification of the inferior 1/2 of the right hemithorax with underlying atelectasis/infiltrate or mass. Patient presented to the ED for increasing shortness of breath. CT chest showed no central or segmental pulmonary embolism. Large right pleural effusion. Multiple masses within the right hemithorax which appear to be pleural based and along the pericardium. Metastatic disease suspected. Currently patient afebrile. Intermittent tachycardia and hemodynamically stable but hypertensive. Currently on 2 l NC. BMP unremarkable. Hepatic panel & CBC unremarkable. Patient denies fevers, chills, nausea, vomiting, diarrhea, abdominal pain, changes in stool form, hematochezia, melena, hemoptysis, or unintentional weight loss. Has a 13 ppy history of smoking but quit in 1969. Drinks roughly 2-3 drinks daily. Formally worked as a  without history of chemical exposures. Interval History:  10/01/20  Patient is currently on @ No data recorded  T-max is . TMAX[24, and the white count is   WBC   Date Value Ref Range Status   10/01/2020 7.8 3.5 - 11.3 k/uL Final       PAST MEDICAL HISTORY:        Diagnosis Date    Cancer [facility])     prostate, thyroid    Hyperlipidemia     Hypertension      PAST SURGICAL HISTORY:        Procedure Laterality Date    CARPAL TUNNEL RELEASE      MOUTH SURGERY      PROSTATECTOMY      THYROIDECTOMY       FAMILY HISTORY:       Problem Relation Age of Onset    Prostate Cancer Father     Diabetes Brother      SOCIAL HISTORY:   TOBACCO:   reports that he quit smoking about 51 years ago. His smoking use included cigarettes. He has a 20.00 pack-year smoking history. He has never used smokeless tobacco.  ETOH:  reports current alcohol use of about 16.0 standard drinks of alcohol per week. DRUGS: reports no history of drug use. ALLERGIES:    No Known Allergies      HOME MEDICATIONS:  Prior to Admission medications    Medication Sig Start Date End Date Taking?  Authorizing Provider   furosemide (LASIX) 20 MG tablet Take 1 tablet by mouth daily 9/22/20 12/21/20 Yes Cherelle Gonsalez APRN - CNP   albuterol sulfate HFA (VENTOLIN HFA) 108 (90 Base) MCG/ACT inhaler Inhale 2 puffs into the lungs every 4 hours as needed for Wheezing or Shortness of Breath 9/10/20  Yes Cherelle Gonsalez, APRN - CNP   loratadine (CLARITIN) 10 MG tablet TAKE 1 TABLET BY MOUTH DAILY 7/14/20  Yes Cherelle Gonsalez, APRN - CNP   metoprolol tartrate (LOPRESSOR) 25 MG tablet TAKE 1 TABLET BY MOUTH TWICE DAILY 6/9/20  Yes Cherelle Gonsalez, APRN - CNP   meloxicam (MOBIC) 15 MG tablet TAKE 1 TABLET BY MOUTH DAILY 6/8/20  Yes Cherelle Gonsalez, APRN - CNP   simvastatin (ZOCOR) 40 MG tablet TAKE 1 TABLET BY MOUTH EVERY NIGHT 1/20/20  Yes Cherelle Gonsalez, APRN - CNP   levothyroxine (SYNTHROID) 112 MCG tablet TAKE 1 TABLET BY MOUTH EVERY DAY 1/20/20  Yes Cherelle Gonsalez, APRN - CNP   levoFLOXacin (LEVAQUIN) 500 MG tablet Take 1 tablet by mouth daily for 10 days 9/30/20 10/10/20  Cherelle Gonsalez, APRN - CNP     IMMUNIZATIONS:  Most Recent Immunizations   Administered Date(s) Administered    Influenza, High Dose (Fluzone 65 yrs and older) 10/01/2019    Zoster Recombinant (Anna Marie) 2019       REVIEW OF SYSTEMS:  Unobtainable from patient due to sedation/mechanical ventilation    PHYSICAL EXAMINATION     VITAL SIGNS:   LAST-  BP (!) 173/74   Pulse 90   Temp 98.3 °F (36.8 °C) (Oral)   Resp 28   Ht 5' 9\" (1.753 m)   Wt 215 lb (97.5 kg)   SpO2 96%   BMI 31.75 kg/m²   8-24 HR RANGE-  TEMP Temp  Av °F (36.7 °C)  Min: 97.6 °F (36.4 °C)  Max: 98.3 °F (11.6 °C)   BP Systolic (50AMI), JSF:802 , Min:112 , NOEMI:989      Diastolic (70KBB), VSF:01, Min:64, Max:129     PULSE Pulse  Av.9  Min: 68  Max: 105   RR Resp  Av  Min: 28  Max: 28   O2 SAT SpO2  Av %  Min: 96 %  Max: 96 %   OXYGEN DELIVERY No data recorded     Ventilator Settings:  Vent Information  SpO2: 96 %    SYSTEMIC EXAMINATION:   General appearance - Mechanically ventilated, ill-appearing  Mental status - sedated  Eyes - pupils equal and reactive, sclera anicteric  Mouth - mucous membranes moist, pharynx normal without lesions  Neck - supple, no significant adenopathy, carotids upstroke normal bilaterally, no bruits  Chest - Breath sounds bilaterally were diminished to auscultation at bases. There were no wheezes, rhonchi or rales.   There is no intercostal recession or use of accessory muscles  Heart - normal rate, regular rhythm, normal S1, S2, no murmurs, rubs, clicks or gallops  Abdomen - soft, nontender, nondistended, no masses or organomegaly  Neurological - DTR's normal and symmetric, motor and sensory grossly normal bilaterally  Extremities - peripheral pulses normal, no pedal edema, no clubbing or cyanosis  Skin - normal coloration and turgor, no rashes, no suspicious skin lesions noted     DATA REVIEW     Medications: Current Inpatient  Scheduled Meds:   furosemide  20 mg Oral Daily    levothyroxine  112 mcg Oral Daily    cetirizine  10 mg Oral Daily    meloxicam  15 mg Oral Daily    metoprolol tartrate  25 mg Oral BID    atorvastatin  20 mg Oral Daily    sodium chloride flush  10 mL Intravenous 2 times per day    [Held by provider] enoxaparin  40 mg Subcutaneous Daily     Continuous Infusions:  INPUT/OUTPUT:  No intake/output data recorded. LABS:-  ABG:   No results for input(s): POCPH, POCPCO2, POCPO2, POCHCO3, DTYR7CBR in the last 72 hours. CBC:   Recent Labs     09/30/20  1550 10/01/20  0649   WBC 7.4 7.8   HGB 15.5 14.2   HCT 46.4 46.2   MCV 94.0 101.3    148   LYMPHOPCT 14*  --    RBC 4.94 4.56   MCH 31.4 31.1   MCHC 33.4 30.7   RDW 12.8 12.0     BMP:   Recent Labs     09/30/20  1550 10/01/20  0649    144   K 3.9 4.4    106   CO2 29 26   BUN 19 18   CREATININE 0.79 0.61*   GLUCOSE 108* 91     Liver Function Test:   Recent Labs     10/01/20  0649   PROT 5.7*   LABALBU 3.3*   ALT 10   AST 18   ALKPHOS 47   BILITOT 0.59     Amylase/Lipase:  No results for input(s): AMYLASE, LIPASE in the last 72 hours. Coagulation Profile:   Recent Labs     09/30/20  1550 10/01/20  0649   INR 1.1 1.0   PROTIME 11.1 10.8   APTT 27.6  --      Cardiac Enzymes:  No results for input(s): CKTOTAL, CKMB, CKMBINDEX, TROPONINI in the last 72 hours. Lactic Acid:  No results found for: LACTA  BNP:   No results found for: BNP  D-Dimer:  Lab Results   Component Value Date    DDIMER 0.71 09/10/2020     Others:   Lab Results   Component Value Date    TSH 0.30 10/01/2020     No results found for: MIYA, RHEUMFACTOR, SEDRATE, CRP  No results found for: Darvin Decker  No results found for: IRON, TIBC, FERRITIN  No results found for: SPEP, UPEP  Lab Results   Component Value Date    PSA 1.67 04/26/2019     Microbiology:    Pathology:    Radiology Reports:  CT CHEST PULMONARY EMBOLISM W CONTRAST   Final Result   No central or segmental pulmonary embolus. Large right pleural effusion. Multiple masses within the right hemithorax which appear to be pleural based   and along the pericardium. Metastatic disease is suspected.              Pulmonary Function test:    Polysomnogram:    Echocardiogram:   No results found for this or any previous visit. Cardiac Catheterization:   No results found for this or any previous visit. ASSESSMENT AND PLAN     Assessment:    1. Large Right pleural effusion with multiple pleural based masses. 2. Pericardial lymphadenopathy. 3. History of Tobacco abuse. Plan:    I personally interviewed/examined the patient; reviewed interval history, interpreted all available radiographic and laboratory data at the time of service. 1. Patient will need right sided thoracentesis. Can be used at diagnostic as well as therapeutic. 2. Follow up body fluid glucose, protein, LDH, cholesterol, cytology, culture, cell diff. 3. Patient will need CT head and abdomen for evaluation of staging and primary source. 4. Follow up tumor markers. 5. Likely need IR guided biopsy of one of the pleural based masses. 6. Maintain oxygen with NC > 90%. Available family member(s) at beside were updated regarding the current clinical condition and management plan. All concerns/questions were addressed to the best of my abilities. The patient is/remains critically ill with illness/injury that acutely impairs one or more vital organ systems, such that there is a high probability of imminent or life threatening deterioration in the patient's condition. Critical care time of greater than 35 minutes was spent (excluding procedures), in coordination of care during bedside rounds and discussion of patient care in detail, and recommendations of the team were adopted in the plan. Necessity of all invasive devices was also confirmed. Sonny Baires MD  Internal Medicine Resident, PGY-3  [facility]; [place], [place]  10/1/2020, 9:53 AM      Please note that this chart was generated using voice recognition Dragon dictation software.   Although every effort was made to ensure the accuracy of this automated transcription, some errors in transcription may have occurred.

## 2020-10-01 NOTE — PROGRESS NOTES
[facility]  Office: [address], DO, Prachi Valentine, DO, Delphine Noble, DO, Cristiana Morrow, DO, Kiya Black MD, Kwaku Capellan MD, Damari Fong MD, Giuliana Harley MD, Jiles Alpers, MD, Niya Mccracken MD, Tiffany Yates MD, John Watson MD, Sanna Morin MD, Willy Fonseca DO, Jerry Strauss MD, Marisabel Mccarty MD, Babak Solo DO, Bobo Clinton MD,  Yenifer Sánchez DO, Mickey Lazo MD, Padmini Reeves MD, Janine Lora, CNP, [facility] Tessie, CNP, Luis Bonner, CNP, Amy Davidson, CNS, George Fournier, CNP, Brinda Cote, CNP, Kylah Marley, CNP, Gómez Pickett, CNP, Rober Montana, CNP, Yoin Wilcox PA-C, Uma Vail DNP, Romi Nelson, CNP, Aleisha Ellis, CNP, Sammi Stinson, CNP, Marialuisa Vanegas, CNP, Deny Holt, CNP         [facility]   [address] [facility]    Progress Note    10/1/2020    10:20 AM    Name:   Tosha Silverman  MRN:     [id number]     Acct:      [de-identified]   Room:   55/3126-86   Day:  1  Admit Date:  9/30/2020  3:23 PM    PCP:   LO Chambers CNP  Code Status:  Full Code    Subjective:     C/C:   Chief Complaint   Patient presents with    Shortness of Breath     Interval History Status: not changed. Patient seen and examined  Patient feels weak short of breath on oxygen  Patient denies fever chest pain    I am seeing the patient for shortness of breath    Brief History:     Patient was referred to the ER by his primary care physician patient was complaining of shortness of breath CT scan of the chest was done showed lung masses and large pleural effusion    Medications:      Allergies:  No Known Allergies    Current Meds:   Scheduled Meds:    furosemide  20 mg Oral Daily    levothyroxine  112 mcg Oral Daily    cetirizine  10 mg Oral Daily    meloxicam  15 mg Oral Daily    metoprolol tartrate  25 mg Oral BID    atorvastatin  20 mg Oral Daily    sodium chloride flush  10 mL Intravenous 2 times per day    [Held by provider] enoxaparin  40 mg Subcutaneous Daily     Continuous Infusions:   PRN Meds: sodium chloride flush, albuterol, sodium chloride flush, potassium chloride **OR** potassium alternative oral replacement **OR** potassium chloride, magnesium sulfate, acetaminophen **OR** acetaminophen, polyethylene glycol, promethazine **OR** ondansetron    Data:     Past Medical History:   has a past medical history of Cancer ([address] Hyperlipidemia, and Hypertension. Social History:   reports that he quit smoking about 51 years ago. His smoking use included cigarettes. He has a 20.00 pack-year smoking history. He has never used smokeless tobacco. He reports current alcohol use of about 16.0 standard drinks of alcohol per week. He reports that he does not use drugs. Family History:   Family History   Problem Relation Age of Onset    Prostate Cancer Father     Diabetes Brother        Vitals:  BP (!) 173/74   Pulse 90   Temp 98.3 °F (36.8 °C) (Oral)   Resp 28   Ht 5' 9\" (1.753 m)   Wt 215 lb (97.5 kg)   SpO2 96%   BMI 31.75 kg/m²   Temp (24hrs), Av °F (36.7 °C), Min:97.6 °F (36.4 °C), Max:98.3 °F (36.8 °C)    No results for input(s): POCGLU in the last 72 hours. I/O (24Hr):   No intake or output data in the 24 hours ending 10/01/20 1020    Labs:  Hematology:  Recent Labs     20  1550 10/01/20  0649   WBC 7.4 7.8   RBC 4.94 4.56   HGB 15.5 14.2   HCT 46.4 46.2   MCV 94.0 101.3   MCH 31.4 31.1   MCHC 33.4 30.7   RDW 12.8 12.0    148   MPV 9.2 11.1   INR 1.1 1.0     Chemistry:  Recent Labs     20  1550 10/01/20  0649    144   K 3.9 4.4    106   CO2 29 26   GLUCOSE 108* 91   BUN 19 18   CREATININE 0.79 0.61*   ANIONGAP 12 12   LABGLOM >60 >60   GFRAA >60 >60   CALCIUM 9.5 8.8   PROBNP 384*  --    TROPHS 14  --      Recent Labs     10/01/20  0649   PROT 5.7*   LABALBU 3.3*   TSH 0.30   AST 18   ALT 10   ALKPHOS 47   BILITOT 0.59     ABG:No results found for:

## 2020-10-01 NOTE — PROGRESS NOTES
Curtis Castillo is a 80 y.o. male being seen for his weekly follow up. Location of visit: [facility] and  patient residence    Visit Date:  9/30/2020       Reason for Visit:    Chief Complaint   Patient presents with    Shortness of Breath        Shortness of breath is worse, dry cough and fatigue  Levaquin and prednisone completed. Furosemide continues  He expressed concern about who will take care of his wife if he would need to be in hospital       Review of Systems   Constitutional: Positive for fatigue. Negative for chills and fever. HENT: Negative for rhinorrhea and sore throat. Eyes: Negative for discharge and redness. Respiratory: Positive for cough and shortness of breath. Negative for wheezing. Cardiovascular: Negative for chest pain and palpitations. Gastrointestinal: Negative for abdominal pain, diarrhea, nausea and vomiting. Genitourinary: Negative for dysuria and frequency. Musculoskeletal: Positive for back pain. Negative for arthralgias and myalgias. Skin: Negative for rash and wound. Neurological: Negative for dizziness, light-headedness and headaches. Psychiatric/Behavioral: Negative for dysphoric mood and sleep disturbance. The patient is not nervous/anxious. Physical Exam  Vitals signs and nursing note reviewed. Constitutional:       General: He is not in acute distress. Appearance: He is well-developed. He is not ill-appearing. HENT:      Head: Normocephalic and atraumatic. Right Ear: External ear normal.      Left Ear: External ear normal.   Eyes:      General: No scleral icterus. Right eye: No discharge. Left eye: No discharge. Conjunctiva/sclera: Conjunctivae normal.      Pupils: Pupils are equal, round, and reactive to light. Neck:      Thyroid: No thyromegaly. Trachea: No tracheal deviation. Cardiovascular:      Rate and Rhythm: Normal rate and regular rhythm.       Heart sounds: Normal

## 2020-10-01 NOTE — H&P
[facility]  Office: [address], DO, Cody Smoker, DO, Cynthia Centers, DO, Junie Mirza Blood, DO, Colonel Scott MD, Nasir Mcginnis MD, Emi De La Garza MD, Lily Chavez MD, Yamilet Spaulding MD, Harmeet Zhong MD, Palmer Malik MD, Xochilt Hodges MD, Sanna Woods MD, Rtia Jensen, DO, Dayna Tinoco MD, Joshua Titus MD, Reji Love, DO, Alicia Asif MD,  Solomon Rey, DO, Mandi Nesbitt MD, Bridger Younger MD, Getachew Jackson, [facility], [facility] Jesus, CNP, Farida Adams, CNP, Marilu Rader, CNS, Evelio Webb, CNP, Vazquez Ayoub, CNP, Delana Spatz, CNP, Meena León, CNP, Jo Holder, CNP, Malaika Keyes PA-C, [facility], [facility], Margot Yousif, CNP, Zafar Chin, CNP, Leatha Aguirre, CNP, Bryce Pacheco, CNP, Paul Miller, [facility]    HISTORY AND PHYSICAL EXAMINATION            Date:   9/30/2020  Patient name:  Daisey Dancer  Date of admission:  9/30/2020  3:23 PM  MRN:   [id number]  Account:  [de-identified]  YOB: 1932  PCP:    LO Harrell CNP  Room:   [id number]  Code Status:    Full Code    Chief Complaint:     Chief Complaint   Patient presents with    Shortness of Breath       History Obtained From:     patient, electronic medical record    History of Present Illness:     Daisey Dancer is a 80 y.o.  male who presents with Shortness of Breath  He was directly admitted from [facility] ED Sept 30 for the management of Pleural effusion. The pt was seen by his PCP for SOB 9/10/20, CXR showed sm-mod rt pleural effusion. He was started on Levaquin and Lasix. F/u CXR  9/29 showed significant increase of the effusion prompting a CT today. On the way to the CT pt felt so SOB he decided to go to the ED. CT revealed large Rt effusion with mult masses in the right hemithorax which appear to be pleural based and along the pericardium.   Metastatic disease is HFA) 108 (90 Base) MCG/ACT inhaler Inhale 2 puffs into the lungs every 4 hours as needed for Wheezing or Shortness of Breath 9/10/20  Yes Hattie Person, APRN - CNP   loratadine (CLARITIN) 10 MG tablet TAKE 1 TABLET BY MOUTH DAILY 7/14/20  Yes Hattie Person, APRN - CNP   metoprolol tartrate (LOPRESSOR) 25 MG tablet TAKE 1 TABLET BY MOUTH TWICE DAILY 6/9/20  Yes Hattie Person, APRN - CNP   meloxicam (MOBIC) 15 MG tablet TAKE 1 TABLET BY MOUTH DAILY 6/8/20  Yes Hattie Person, APRN - CNP   simvastatin (ZOCOR) 40 MG tablet TAKE 1 TABLET BY MOUTH EVERY NIGHT 1/20/20  Yes Htatie Person, APRN - CNP   levothyroxine (SYNTHROID) 112 MCG tablet TAKE 1 TABLET BY MOUTH EVERY DAY 1/20/20  Yes Hattie Person, APRN - CNP   levoFLOXacin (LEVAQUIN) 500 MG tablet Take 1 tablet by mouth daily for 10 days 9/30/20 10/10/20  Hattie Person, APRN - CNP        Allergies:     Patient has no known allergies. Social History:     Tobacco:    reports that he quit smoking about 51 years ago. His smoking use included cigarettes. He has a 20.00 pack-year smoking history. He has never used smokeless tobacco.  Alcohol:      reports current alcohol use of about 16.0 standard drinks of alcohol per week. Drug Use:  reports no history of drug use. Family History:     Family History   Problem Relation Age of Onset    Prostate Cancer Father     Diabetes Brother        Review of Systems:     Positive and Negative as described in HPI. Review of Systems   Constitutional: Positive for activity change and appetite change. Negative for chills, diaphoresis, fatigue, fever and unexpected weight change. HENT: Negative for sore throat, trouble swallowing and voice change. Eyes: Negative for photophobia and visual disturbance. Respiratory: Positive for cough and shortness of breath. Negative for chest tightness, wheezing and stridor. Cardiovascular: Negative for chest pain, palpitations and leg swelling.    Gastrointestinal: Negative for abdominal distention, abdominal pain, constipation, diarrhea, nausea and vomiting. Endocrine: Negative for polyphagia and polyuria. Genitourinary: Negative for decreased urine volume, difficulty urinating and hematuria. Musculoskeletal: Negative for arthralgias, back pain, gait problem, myalgias, neck pain and neck stiffness. Skin: Negative for color change, pallor, rash and wound. Allergic/Immunologic: Negative for immunocompromised state. Neurological: Positive for tremors. Negative for dizziness, syncope, speech difficulty, weakness, light-headedness and headaches. Psychiatric/Behavioral: Negative for agitation, behavioral problems and confusion. The patient is not nervous/anxious. Physical Exam:   BP (!) 145/76   Pulse 105   Temp 97.6 °F (36.4 °C) (Axillary)   Resp 24   SpO2 96%   Temp (24hrs), Av °F (36.7 °C), Min:97.6 °F (36.4 °C), Max:98.3 °F (36.8 °C)    No results for input(s): POCGLU in the last 72 hours. No intake or output data in the 24 hours ending 20 2205    Physical Exam  Vitals signs and nursing note reviewed. Constitutional:       General: He is not in acute distress. Appearance: Normal appearance. He is obese. He is ill-appearing. He is not toxic-appearing or diaphoretic. HENT:      Head: Normocephalic and atraumatic. Right Ear: External ear normal.      Left Ear: External ear normal.      Nose: Nose normal. No congestion or rhinorrhea. Mouth/Throat:      Mouth: Mucous membranes are dry. Pharynx: Oropharynx is clear. Eyes:      Extraocular Movements: Extraocular movements intact. Conjunctiva/sclera: Conjunctivae normal.      Pupils: Pupils are equal, round, and reactive to light. Neck:      Musculoskeletal: Normal range of motion and neck supple. No neck rigidity or muscular tenderness. Cardiovascular:      Rate and Rhythm: Normal rate and regular rhythm. Pulses: Normal pulses. Heart sounds: Normal heart sounds.  No 5.3 mmol/L    Chloride 102 98 - 107 mmol/L    CO2 29 20 - 31 mmol/L    Anion Gap 12 9 - 17 mmol/L    GFR Non-African American >60 >60 mL/min    GFR African American >60 >60 mL/min    GFR Comment          GFR Staging NOT REPORTED    Protime-INR    Collection Time: 09/30/20  3:50 PM   Result Value Ref Range    Protime 11.1 9.4 - 12.6 sec    INR 1.1    APTT    Collection Time: 09/30/20  3:50 PM   Result Value Ref Range    PTT 27.6 21.3 - 31.3 sec   CBC Auto Differential    Collection Time: 09/30/20  3:50 PM   Result Value Ref Range    WBC 7.4 3.5 - 11.0 k/uL    RBC 4.94 4.5 - 5.9 m/uL    Hemoglobin 15.5 13.5 - 17.5 g/dL    Hematocrit 46.4 41 - 53 %    MCV 94.0 80 - 100 fL    MCH 31.4 26 - 34 pg    MCHC 33.4 31 - 37 g/dL    RDW 12.8 12.5 - 15.4 %    Platelets 997 272 - 604 k/uL    MPV 9.2 6.0 - 12.0 fL    NRBC Automated NOT REPORTED per 100 WBC    Differential Type NOT REPORTED     Seg Neutrophils 72 (H) 36 - 66 %    Lymphocytes 14 (L) 24 - 44 %    Monocytes 11 2 - 11 %    Eosinophils % 2 1 - 4 %    Basophils 1 0 - 2 %    Immature Granulocytes NOT REPORTED 0 %    Segs Absolute 5.40 1.8 - 7.7 k/uL    Absolute Lymph # 1.00 1.0 - 4.8 k/uL    Absolute Mono # 0.80 0.1 - 1.2 k/uL    Absolute Eos # 0.10 0.0 - 0.4 k/uL    Basophils Absolute 0.00 0.0 - 0.2 k/uL    Absolute Immature Granulocyte NOT REPORTED 0.00 - 0.30 k/uL    WBC Morphology NOT REPORTED     RBC Morphology NOT REPORTED     Platelet Estimate NOT REPORTED    Troponin    Collection Time: 09/30/20  3:50 PM   Result Value Ref Range    Troponin, High Sensitivity 14 0 - 22 ng/L    Troponin T NOT REPORTED <0.03 ng/mL    Troponin Interp NOT REPORTED    Brain Natriuretic Peptide    Collection Time: 09/30/20  3:50 PM   Result Value Ref Range    Pro- (H) <300 pg/mL    BNP Interpretation Pro-BNP Reference Range:    COVID-19    Collection Time: 09/30/20  4:05 PM    Specimen: Other   Result Value Ref Range    SARS-CoV-2          SARS-CoV-2, Rapid Not Detected Not Detected    Source . NASOPHARYNGEAL SWAB     SARS-CoV-2             Imaging/Diagnostics:    Xr Chest (2 Vw)  Result Date: 9/29/2020  Interval increase in now moderate right basilar opacity with opacification of the inferior 1/2 of the right hemithorax, findings likely related to increasing moderately large right pleural effusion with underlying atelectasis/infiltrate and or mass RECOMMENDATION: Significant examination results were called to the patient's licensed caregiver       Ct Chest Pulmonary Embolism W Contrast  Result Date: 9/30/2020  No central or segmental pulmonary embolus. Large right pleural effusion. Multiple masses within the right hemithorax which appear to be pleural based and along the pericardium. Metastatic disease is suspected. Assessment :      Hospital Problems           Last Modified POA    * (Principal) Pleural effusion 9/30/2020 Yes    Postoperative hypothyroidism 9/30/2020 Yes    Essential hypertension 9/30/2020 Yes    History of prostate cancer 9/30/2020 Yes    History of malignant neoplasm of thyroid 9/30/2020 Yes    Lung mass 9/30/2020 Yes          Plan:     Patient status inpatient in the Med/Surge    1. Right pleural effusion with multiple lung nodules: Hem/Onc consulted and will placePulmonology consult, continue supportive oxygen as needed, am CXR, anticipate thoracentesis tomorrow  2. Hypothyroidism s/p surgical removal  3. HTN  4. HLD  5. Follow daily labs and CXR, resume current home medications including lasix as this is a new medication for patient, NPO after MN  6. Lovenox for DVT prophylaxis  7. Long discussion had with patient regarding concern for malignancy and current POC, patient verbalizes understanding. He is very concerned about the well being of his wife. They live in a senior community/independent living, wife has dementia and he is the primary caregiver. There are children in the area who he identifies as a support system.     Consultations:   IP CONSULT TO ONCOLOGY  IP CONSULT TO PULMONOLOGY    Patient is admitted as inpatient status because of co-morbidities listed above, severity of signs and symptoms as outlined, requirement for current medical therapies and most importantly because of direct risk to patient if care not provided in a hospital setting. Expected length of stay > 48 hours.     LO Parada - NP  9/30/2020  10:05 PM    Copy sent to Dr. Joon Strickland APRN - CNP

## 2020-10-01 NOTE — PROGRESS NOTES
Could not coordinate bedside US. Ordered IR guided thoracentesis. IR will take him today. Platelets ok, covid negative, lovenox held. Yaima Aguirre MD  Internal Medicine Resident, PGY-3  [address]  10/1/2020, 2:28 PM   Attending Physician Statement  I have discussed the care of Yanira Jose, including pertinent history and exam findings,  with the resident. I have seen and examined the patient and the key elements of all parts of the encounter have been performed by me. I agree with the assessment, plan and orders as documented by the resident with additions . To proceed with thoracentesis. Treatment plan Discussed with nursing staff in detail , all questions answered . Electronically signed by Miguel Real MD on   10/1/20 at 8:58 PM EDT    Please note that this chart was generated using voice recognition Dragon dictation software. Although every effort was made to ensure the accuracy of this automated transcription, some errors in transcription may have occurred.

## 2020-10-01 NOTE — CONSULTS
Today's Date: 10/1/2020  Patient Name: Chantelle Javed  Date of admission: 9/30/2020  3:23 PM  Patient's age: 80 y.o., 7/2/1932  Admission Dx: Pleural effusion [J90]  Pleural effusion [J90]    Reason for Consult: Large right-sided pleural effusion with nodules in pleura and pericardium. Concerns for metastatic disease. Requesting Physician: Renzo Tanner MD    CHIEF COMPLAINT: Worsening shortness of breath since 3 weeks    History Obtained From:  patient and family. HISTORY OF PRESENT ILLNESS:      The patient is a 80 y.o.  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 Heme oncology was consulted for suspicion of metastatic disease. Unknown primary. Patient also reported poor appetite and intermittent episodes of dry cough since 1 month. No significant weight loss reported. Significant risk factors include 13-pack-year history of smoking quit in 1969  Daily drinker drinking 2-3 drinks every day. Denied exposure to chemicals like asbestos/silica. Evaluated by pulmonology and plan for IR guided thoracentesis today. Past Medical History:   has a past medical history of Cancer ([address] Hyperlipidemia, and Hypertension. Past Surgical History:   has a past surgical history that includes Prostatectomy; Thyroidectomy; Carpal tunnel release; and Mouth surgery. Medications:    Reviewed in Epic     Allergies:  Patient has no known allergies. Social History:   reports that he quit smoking about 51 years ago. His smoking use included cigarettes.  He has a 20.00 pack-year smoking history. He has never used smokeless tobacco. He reports current alcohol use of about 16.0 standard drinks of alcohol per week. He reports that he does not use drugs. Family History: family history includes Diabetes in his brother; Prostate Cancer in his father. REVIEW OF SYSTEMS:    Constitutional: No fever or chills. No night sweats, no weight loss. Poor appetite present. Eyes: No eye discharge, double vision, or eye pain   HEENT: negative for sore mouth, sore throat, hoarseness and voice change   Respiratory: Positive for dry cough, bilateral wheezes  Cardiovascular: Positive for dyspnea and orthopnea  Gastrointestinal: negative for constipation, abdominal pain, Dysphagia, hematemesis and hematochezia   Genitourinary: negative for frequency, dysuria, nocturia, urinary incontinence, and hematuria   Integument: negative for rash, skin lesions, bruises. Hematologic/Lymphatic: negative for easy bruising, bleeding, lymphadenopathy, or petechiae   Endocrine: negative for heat or cold intolerance,weight changes, change in bowel habits and hair loss   Musculoskeletal: negative for myalgias, arthralgias, pain, joint swelling,and bone pain   Neurological: negative for headaches, dizziness, seizures, weakness, numbness    PHYSICAL EXAM:      BP (!) 173/74   Pulse 90   Temp 98.3 °F (36.8 °C) (Oral)   Resp 28   Ht 5' 9\" (1.753 m)   Wt 215 lb (97.5 kg)   SpO2 96%   BMI 31.75 kg/m²    Temp (24hrs), Av °F (36.7 °C), Min:97.6 °F (36.4 °C), Max:98.3 °F (36.8 °C)    General appearance - well appearing, on 2 L nasal cannula. Mental status - alert and cooperative   Mouth - mucous membranes moist  Neck -Nno significant adenopathy, s/p resection of thyroid  Lymphatics - no palpable lymphadenopathy. Chest -bilaterally diminished breath sounds at bases. Bilateral expiratory wheezes noticed.   Heart - normal rate, regular rhythm, normal S1, S2, no murmurs  Abdomen - soft, nontender, nondistended, no masses or organomegaly   Neurological - alert, oriented, normal speech, no focal findings   Musculoskeletal - no joint tenderness, deformity or swelling   Extremities - peripheral pulses normal, no pedal edema, no clubbing or cyanosis   Skin - normal coloration and turgor, no rashes, no suspicious skin lesions noted ,    DATA:    Labs:   CBC:   Recent Labs     09/30/20  1550 10/01/20  0649   WBC 7.4 7.8   HGB 15.5 14.2   HCT 46.4 46.2    148     BMP:   Recent Labs     09/30/20  1550 10/01/20  0649    144   K 3.9 4.4   CO2 29 26   BUN 19 18   CREATININE 0.79 0.61*   LABGLOM >60 >60   GLUCOSE 108* 91     PT/INR:   Recent Labs     09/30/20  1550 10/01/20  0649   PROTIME 11.1 10.8   INR 1.1 1.0       IMAGING DATA:  CT chest PE    No central or segmental pulmonary embolus.         Large right pleural effusion.         Multiple masses within the right hemithorax which appear to be pleural based    and along the pericardium.  Metastatic disease is suspected     Chest x-ray 9/29/2020  Interval increase in now moderate right basilar opacity with opacification of    the inferior 1/2 of the right hemithorax, findings likely related to    increasing moderately large right pleural effusion with underlying    atelectasis/infiltrate and or mass        Primary Problem  Pleural effusion    Active Hospital Problems    Diagnosis Date Noted    Pleural effusion [J90] 09/30/2020    Lung mass [R91.8] 09/30/2020    History of prostate cancer [Z85.46] 11/13/2018    Postoperative hypothyroidism [E89.0] 11/13/2018    Essential hypertension [I10] 09/28/2018    History of malignant neoplasm of thyroid [Z85.850] 06/20/2017         IMPRESSION:   1. Large right-sided pleural effusion with multiple pleural-based masses present suspicion for metastatic disease. 2.  Pericardial lymphadenopathy  3. Significant history of smoking    RECOMMENDATIONS:    1. Labs and imaging studies reviewed.   Discussed in details with family members and patient. 2.   Underwent IR guided right-sided thoracentesis. Follow-up pleural fluid studies. Follow-up tumor markers. Further recommendations post results    3. Follow-up CT abdomen pelvis with IV contrast for further evaluation of primary source. 4.  Supportive care per primary team.    Discussed with attending physician and nursing staff. Thank you for asking us to see this patient. Rose Madison MD      Department of Internal Medicine  [address] [place] [address]         10/1/2020, 12:00 PM    Attending Physician Statement   I have discussed the care of this patient, including pertinent history and exam findings, with the resident. I have seen and examined the patient and the key elements of all parts of the encounter have been performed by me. I agree with the assessment, plan and orders as documented by the resident and with changes made to the note.     Will follow results from thoracentesis  If negative may need biopsy  Further recommendations based on tissue diagnosis    Sybil Mclean MD  Hematology/Oncology    Cell: [phone]

## 2020-10-01 NOTE — PROGRESS NOTES
Thoracentesis: RIGHT CHEST    Dr. Sisi ROSE  RN  [address].    Patient to [address] [facility] room 8, identified, no allergies confirmed. Sitting up, monitors on. Stable. Time out complete. Prep to RIGHT back per JUANY SINGH  1.5 L of fluid drawn off. sample WALKED TO lab per writer. 2x2 with Tegaderm to RIGHT back puncture. No problems noted. Patient tolerated well. Report called to  CAROL Thompson  Patient stable, off monitor, and awaiting transport in IR holding

## 2020-10-02 ENCOUNTER — APPOINTMENT (OUTPATIENT)
Dept: CT IMAGING | Age: 85
DRG: 180 | End: 2020-10-02
Payer: MEDICARE

## 2020-10-02 ENCOUNTER — TELEPHONE (OUTPATIENT)
Dept: PRIMARY CARE CLINIC | Age: 85
End: 2020-10-02

## 2020-10-02 VITALS
TEMPERATURE: 97.7 F | HEIGHT: 69 IN | WEIGHT: 215 LBS | OXYGEN SATURATION: 98 % | DIASTOLIC BLOOD PRESSURE: 80 MMHG | RESPIRATION RATE: 16 BRPM | HEART RATE: 65 BPM | SYSTOLIC BLOOD PRESSURE: 148 MMHG | BODY MASS INDEX: 31.84 KG/M2

## 2020-10-02 LAB
APPEARANCE FLUID: NORMAL
BASO FLUID: NORMAL %
COLOR FLUID: NORMAL
EOSINOPHIL FLUID: NORMAL %
FLUID DIFF COMMENT: NORMAL
LYMPHOCYTES, BODY FLUID: 36 %
MONOCYTE, FLUID: NORMAL %
NEUTROPHIL, FLUID: 2 %
OTHER CELLS FLUID: NORMAL %
RBC FLUID: NORMAL /MM3
SPECIMEN TYPE: NORMAL
SURGICAL PATHOLOGY REPORT: NORMAL
WBC FLUID: 598 /MM3

## 2020-10-02 PROCEDURE — 6370000000 HC RX 637 (ALT 250 FOR IP): Performed by: CLINICAL NURSE SPECIALIST

## 2020-10-02 PROCEDURE — 99232 SBSQ HOSP IP/OBS MODERATE 35: CPT | Performed by: INTERNAL MEDICINE

## 2020-10-02 PROCEDURE — 99233 SBSQ HOSP IP/OBS HIGH 50: CPT | Performed by: INTERNAL MEDICINE

## 2020-10-02 PROCEDURE — 97530 THERAPEUTIC ACTIVITIES: CPT

## 2020-10-02 PROCEDURE — 6360000004 HC RX CONTRAST MEDICATION: Performed by: STUDENT IN AN ORGANIZED HEALTH CARE EDUCATION/TRAINING PROGRAM

## 2020-10-02 PROCEDURE — 74177 CT ABD & PELVIS W/CONTRAST: CPT

## 2020-10-02 PROCEDURE — 97166 OT EVAL MOD COMPLEX 45 MIN: CPT

## 2020-10-02 PROCEDURE — 97162 PT EVAL MOD COMPLEX 30 MIN: CPT

## 2020-10-02 PROCEDURE — 97535 SELF CARE MNGMENT TRAINING: CPT

## 2020-10-02 RX ADMIN — MELOXICAM 15 MG: 7.5 TABLET ORAL at 09:18

## 2020-10-02 RX ADMIN — LEVOTHYROXINE SODIUM 112 MCG: 112 TABLET ORAL at 10:31

## 2020-10-02 RX ADMIN — FUROSEMIDE 20 MG: 20 TABLET ORAL at 09:18

## 2020-10-02 RX ADMIN — METOPROLOL TARTRATE 25 MG: 25 TABLET ORAL at 09:18

## 2020-10-02 RX ADMIN — CETIRIZINE HYDROCHLORIDE 10 MG: 10 TABLET ORAL at 09:18

## 2020-10-02 RX ADMIN — ATORVASTATIN CALCIUM 20 MG: 20 TABLET, FILM COATED ORAL at 09:19

## 2020-10-02 RX ADMIN — IOPAMIDOL 75 ML: 755 INJECTION, SOLUTION INTRAVENOUS at 08:44

## 2020-10-02 ASSESSMENT — PAIN SCALES - GENERAL
PAINLEVEL_OUTOF10: 0
PAINLEVEL_OUTOF10: 0

## 2020-10-02 NOTE — PLAN OF CARE
Problem: Skin Integrity:  Goal: Will show no infection signs and symptoms  Description: Will show no infection signs and symptoms  Outcome: Ongoing  Goal: Absence of new skin breakdown  Description: Absence of new skin breakdown  Outcome: Ongoing

## 2020-10-02 NOTE — PROGRESS NOTES
Discharge order received. IV removed. Writer went over discharge instructions with pt including follow up appointments and medications. Pt verbalized understanding.

## 2020-10-02 NOTE — PROGRESS NOTES
Progress Note               Date:                           10/2/2020  Patient name:           Natan Dinero  Date of admission:  9/30/2020  3:23 PM  MRN:   [id number]  YOB: 1932  PCP:                           LO Lizarraga CNP        Subjective:   Pt seen and examined bedside. Hemodynamically and clinically stable. Results of pathology report concerning for adenocarcinoma updated to pt and family at bedside. HPI in Brief:   The patient is a 80 y.o.  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     [facility] oncology was consulted for suspicion of metastatic disease. Unknown primary.     Patient also reported poor appetite and intermittent episodes of dry cough since 1 month. No significant weight loss reported. Significant risk factors include 13-pack-year history of smoking quit in 1969  Daily drinker drinking 2-3 drinks every day. Denied exposure to chemicals like asbestos/silica  Evaluated by pulmonology. Had IR guided thoracentesis. Surg path report showed adenocarcinoma of lung. Review of systems  General: no fever or night sweats, Weight is stable. ENT: No double or blurred vision, no tinnitus or hearing problem, no dysphagia or sore throat   Respiratory: No chest pain, no shortness of breath, no cough or hemoptysis. Cardiovascular: Denies chest pain, PND or orthopnea. No L E swelling or palpitations. Gastrointestinal:    No nausea or vomiting, abdominal pain, diarrhea or constipation.     Genitourinary: Denies dysuria, hematuria, frequency, urgency or incontinence. Neurological: Denies headaches, decreased LOC, no sensory or motor focal deficits. Medications:   Reviewed in Epic     Objective:   Vitals: BP (!) 148/80   Pulse 65   Temp 97.7 °F (36.5 °C) (Oral)   Resp 16   Ht 5' 9\" (1.753 m)   Wt 215 lb (97.5 kg)   SpO2 98%   BMI 31.75 kg/m²     General appearance - well appearing, on 2 L nasal cannula. Mental status - alert and cooperative   Mouth - mucous membranes moist  Neck -Nno significant adenopathy, s/p resection of thyroid  Lymphatics - no palpable lymphadenopathy. Chest -bilaterally diminished breath sounds at bases. Bilateral expiratory wheezes noticed. Heart - normal rate, regular rhythm, normal S1, S2, no murmurs  Abdomen - soft, nontender, nondistended, no masses or organomegaly   Neurological - alert, oriented, normal speech, no focal findings   Musculoskeletal - no joint tenderness, deformity or swelling   Extremities - peripheral pulses normal, no pedal edema, no clubbing or cyanosis   Skin - normal coloration and turgor, no rashes, no suspicious skin lesions noted ,     Data:  No intake/output data recorded. No intake/output data recorded.   CBC:   Recent Labs     09/30/20  1550 10/01/20  0649   WBC 7.4 7.8   HGB 15.5 14.2    148     BMP:    Recent Labs     09/30/20  1550 10/01/20  0649    144   K 3.9 4.4    106   CO2 29 26   BUN 19 18   CREATININE 0.79 0.61*   GLUCOSE 108* 91     Hepatic:   Recent Labs     10/01/20  0649   AST 18   ALT 10   BILITOT 0.59   ALKPHOS 47     INR:   Recent Labs     09/30/20  1550 10/01/20  0649   INR 1.1 1.0       IMAGING DATA:  CT chest PE     No central or segmental pulmonary embolus.         Large right pleural effusion.         Multiple masses within the right hemithorax which appear to be pleural based    and along the pericardium.  Metastatic disease is suspected      Chest x-ray 9/29/2020  Interval increase in now moderate right basilar opacity with opacification of    the inferior 1/2 of the right hemithorax, findings likely related to    increasing moderately large right pleural effusion with underlying    atelectasis/infiltrate and or mass       CT abd pelvis- unremarkable for mets        Problem Lists:   Primary Problem:  Pleural effusion   Current Problems:  Active Hospital Problems    Diagnosis Date Noted    Dyspnea [R06.00]     Pleural effusion [J90] 09/30/2020    Lung mass [R91.8] 09/30/2020    History of prostate cancer [Z85.46] 11/13/2018    Postoperative hypothyroidism [E89.0] 11/13/2018    Essential hypertension [I10] 09/28/2018    History of malignant neoplasm of thyroid [Z85.850] 06/20/2017     PMH:  Past Medical History:   Diagnosis Date    Cancer ([facility] [address]     prostate, thyroid    Hyperlipidemia     Hypertension       Allergies: No Known Allergies   Assessment    1. Large right-sided pleural effusion with multiple pleural-based masses present suspicion for metastatic disease.     2.  Pericardial lymphadenopathy  3. Significant history of smoking        Plan     1. Results of pathology report showed adenocarcinoma  of lung. Updated results to pt and family at bedside. Plan to follow up O/p to discuss treatment options. 2. CT abd/pelvis-unremarkable for metastases. Sita Pickering MD      Department of Internal Medicine  [facility]         10/2/2020, 7:20 PM    Attending Physician Statement   I have discussed the care of this patient, including pertinent history and exam findings, with the resident. I have seen and examined the patient and the key elements of all parts of the encounter have been performed by me. I agree with the assessment, plan and orders as documented by the resident and with changes made to the note.     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

## 2020-10-02 NOTE — PROGRESS NOTES
Currently in Pain: Denies     Social/Functional History  Social/Functional History  Lives With: Spouse(Per PT note, pt wife with memory deficits and requires assistance. Independent with bathing/dressing.)  Type of Home: Assisted living  Home Layout: One level  Home Access: Level entry  Bathroom Shower/Tub: Walk-in shower  Bathroom Toilet: Standard  Bathroom Equipment: Grab bars in shower  Home Equipment: (No DME)  ADL Assistance: Independent  Homemaking Assistance: Independent  Homemaking Responsibilities: Yes  Ambulation Assistance: Independent  Transfer Assistance: Independent  Active : Yes  Occupation: Retired  Type of occupation: HR for a [employer]  [address]: Spending time with wife  Additional Comments: Reports good family support at home to assist PRN       Objective   Vision Exceptions: Wears glasses for distance    Hearing: Exceptions to [email or web address]  Hearing Exceptions: Hard of hearing/hearing concerns      Orientation  Overall Orientation Status: Within Functional Limits    Observation/Palpation  Posture: Good     Balance  Sitting Balance: Supervision  Standing Balance: Stand by assistance  Standing Balance  Time: ~12 minutes  Activity: functional mobility around hospital room, to/from bathroom, hospital aponte    Functional Mobility  Functional - Mobility Device: No device  Activity: To/from bathroom  Assist Level: Stand by assistance  Functional Mobility Comments: Pt steady. Completed functional mobility without O2 to mimic home. Pt with slight SOB after, recovered quickly.     ADL  Feeding: Independent  Grooming: Stand by assistance(sink for hand hygeine)  UE Bathing: Supervision  LE Bathing: Stand by assistance  UE Dressing: Supervision  LE Dressing: Stand by assistance  Toileting: Stand by assistance(Pt completed toilet transfer, clothing management and pericare.)     Tone RUE  RUE Tone: Normotonic  Tone LUE    LUE Tone: Normotonic  Coordination  Movements Are Fluid And Coordinated: Yes     Bed mobility  Rolling to Right: Supervision  Supine to Sit: Supervision  Sit to Supine: Supervision     Transfers  Sit to stand: Supervision  Stand to sit: Supervision     Cognition  Overall Cognitive Status: WFL        Sensation  Overall Sensation Status: WFL        LUE AROM (degrees)  LUE AROM : WFL  Left Hand AROM (degrees)  Left Hand AROM: WFL  RUE AROM (degrees)  RUE AROM : WFL  Right Hand AROM (degrees)  Right Hand AROM: WFL  LUE Strength  Gross LUE Strength: WFL  L Hand General: 5/5  LUE Strength Comment: Grossly 5/5  RUE Strength  Gross RUE Strength: WFL  R Hand General: 5/5  RUE Strength Comment: Grossly 5/5                   Plan   Plan  Times per week: 2-3x/week  Current Treatment Recommendations: Safety Education & Training, Patient/Caregiver Education & Training, Self-Care / ADL, Functional Mobility Training, Equipment Evaluation, Education, & procurement, Endurance Training                        AM-PAC Score        AM-PAC Inpatient Daily Activity Raw Score: 20 (10/02/20 1616)  AM-PAC Inpatient ADL T-Scale Score : 42.03 (10/02/20 1616)  ADL Inpatient CMS 0-100% Score: 38.32 (10/02/20 1616)  ADL Inpatient CMS G-Code Modifier : Darrick Joseph (10/02/20 1616)    Goals  Short term goals  Time Frame for Short term goals: By discharge  Short term goal 1: Pt will complete functional mobility independently  Short term goal 2: Pt will complete ADLs independently  Short term goal 3: Pt will demo +20 minutes of standing tolerance during functional task independently, using energy conservation techniques PRN  Short term goal 4: Pt will demo/verbalize use of energy conservation techniques during all functional mobility/ADL tasks       Therapy Time   Individual Concurrent Group Co-treatment   Time In 1457         Time Out 1533         Minutes 36         Timed Code Treatment Minutes: 25 Minutes       MAGDI Gatica/BEN

## 2020-10-02 NOTE — DISCHARGE INSTR - COC
Continuity of Care Form    Patient Name: Noemí Menjivar   :  1932  MRN:  [id number]    Admit date:  2020  Discharge date:  ***    Code Status Order: Full Code   Advance Directives:   Advance Care Flowsheet Documentation       Date/Time Healthcare Directive Type of Healthcare Directive Copy in [address] Agent's Name Healthcare Agent's Phone Number    10/01/20 [phone]  No, patient does not have an advance directive for healthcare treatment -- -- -- -- --            Admitting Physician:  Isabella Cartagena MD  PCP: Amari Lopez, APRN - CNP    Discharging Nurse: [facility] Unit/Room#: 7953/6529-25  Discharging Unit Phone Number: ***    Emergency Contact:   Extended Emergency Contact Information  Primary Emergency Contact: Rosie [address] Phone: [phone]  Relation: Spouse    Past Surgical History:  Past Surgical History:   Procedure Laterality Date    CARPAL TUNNEL RELEASE      MOUTH SURGERY      PROSTATECTOMY      THYROIDECTOMY         Immunization History:   Immunization History   Administered Date(s) Administered    Influenza, High Dose (Fluzone 65 yrs and older) 10/01/2019    Zoster Recombinant (Shingrix) 2019       Active Problems:  Patient Active Problem List   Diagnosis Code    Postoperative hypothyroidism E89.0    Essential hypertension I10    Hyperlipidemia E78.5    Osteoarthritis of both hips M16.0    H/O head and neck radiation Z92.3    History of prostate cancer Z85.46    Actinic keratosis L57.0    Neoplasm of uncertain behavior of skin D48.5    Pleural effusion J90    History of malignant neoplasm of thyroid Z85.850    Lung mass R91.8    Dyspnea R06.00       Isolation/Infection:   Isolation            No Isolation          Patient Infection Status       Infection Onset Added Last Indicated Last Indicated By Review Planned Expiration Resolved Resolved By    None active    Resolved    COVID-19 Rule Out 20 09/30/20 09/30/20 COVID-19 (Ordered)   09/30/20 Rule-Out Test Resulted            Nurse Assessment:  Last Vital Signs: BP (!) 148/80   Pulse 65   Temp 97.7 °F (36.5 °C) (Oral)   Resp 16   Ht 5' 9\" (1.753 m)   Wt 215 lb (97.5 kg)   SpO2 98%   BMI 31.75 kg/m²     Last documented pain score (0-10 scale): Pain Level: 0  Last Weight:   Wt Readings from Last 1 Encounters:   09/30/20 215 lb (97.5 kg)     Mental Status:  {IP PT MENTAL STATUS:20030:::0}    IV Access:  { MIRA IV ACCESS:620550419:::0}    Nursing Mobility/ADLs:  Walking   {CHP DME ADLs:492429564:::0}  Transfer  {CHP DME ADLs:768791707:::0}  Bathing  {CHP DME ADLs:436920343:::0}  Dressing  {CHP DME ADLs:274774321:::0}  Toileting  {CHP DME ADLs:132062946:::0}  Feeding  {CHP DME ADLs:822696587:::0}  Med Admin  {CHP DME ADLs:997925959:::0}  Med Delivery   { MIRA MED Delivery:724822921:::0}    Wound Care Documentation and Therapy:        Elimination:  Continence: Bowel: {YES / QW:78304}  Bladder: {YES / AK:56885}  Urinary Catheter: {Urinary Catheter:492579021:::0}   Colostomy/Ileostomy/Ileal Conduit: {YES / WT:06616}       Date of Last BM: ***  No intake or output data in the 24 hours ending 10/02/20 1502  No intake/output data recorded.     Safety Concerns:     [address] Safety Concerns:415831867:::0}    Impairments/Disabilities:      [address] Impairments/Disabilities:352006636:::0}    Nutrition Therapy:  Current Nutrition Therapy:   [address] Diet List:705466148:::0}    Routes of Feeding: {CHP DME Other Feedings:268891700:::0}  Liquids: {Slp liquid thickness:14589}  Daily Fluid Restriction: {CHP DME Yes amt example:242934699:::0}  Last Modified Barium Swallow with Video (Video Swallowing Test): {Done Not Done [facility]:102736079:::2}    Treatments at the Time of Hospital Discharge:   Respiratory Treatments: ***  Oxygen Therapy:  {Therapy; copd oxygen:72161:::0}  Ventilator:    { CC Vent List:178326219:::0}    Rehab Therapies: {THERAPEUTIC INTERVENTION:4545387174}  Weight Bearing Status/Restrictions: Ramya DELEON Weight Bearin:::0}  Other Medical Equipment (for information only, NOT a DME order):  {EQUIPMENT:887186373}  Other Treatments: ***    Patient's personal belongings (please select all that are sent with patient):  {CHP DME Belongings:407242504:::0}    RN SIGNATURE:  {Esignature:137904919:::0}    CASE MANAGEMENT/SOCIAL WORK SECTION    Inpatient Status Date: ***    Readmission Risk Assessment Score:  Readmission Risk              Risk of Unplanned Readmission:        7           Discharging to Facility/ Agency   Name:   Address:  Phone:  Fax:    Dialysis Facility (if applicable)   Name:  Address:  Dialysis Schedule:  Phone:  Fax:    / signature: {Esignature:246628930:::0}    PHYSICIAN SECTION    Prognosis: Fair    Condition at Discharge: Stable    Rehab Potential (if transferring to Rehab): Fair    Recommended Labs or Other Treatments After Discharge: cbc cmp in one week chest x rAY in 2 weeks     Physician Certification: I certify the above information and transfer of Yanira Jose  is necessary for the continuing treatment of the diagnosis listed and that he requires [address] for less 30 days.      Update Admission H&P: No change in H&P    PHYSICIAN SIGNATURE:  Electronically signed by Khoa Herzog MD on 10/2/20 at 3:03 PM EDT

## 2020-10-02 NOTE — CARE COORDINATION
Face to face, order, home 02 eval.face sheet faxed to [facility] [phone] .  Writer spoke with Tre Calvillo from [facility] all info given

## 2020-10-02 NOTE — PROGRESS NOTES
Home Oxygen Evaluation    Home Oxygen Evaluation completed. Patient is on 2 liters per minute via NC. Resting SpO2 = 100%  Resting SpO2 on room air = 97%    SpO2 on room air with exercise = 85%  SpO2 on oxygen as above with exercise = 94%    Nocturnal Oximetry with patient on room air is recommended is SpO2 is between 89% and 95% (requires additional order).     Phil Kaufman  4:52 PM

## 2020-10-02 NOTE — DISCHARGE SUMMARY
Discharge [facility] Case Management Department  Written by: Wendy Velez RN    Patient Name: Pricila Rai  Attending Provider: No att. providers found  Admit Date: 2020  3:23 PM  MRN: [id number]  Account: [de-identified]                     : 1932  Discharge Date: 10/2/2020    Lorie Osman from [address] [place] [address] phoned and verified he is on his way to [facility] home with home 02.    Disposition: home    Wendy Velez RN

## 2020-10-02 NOTE — DISCHARGE SUMMARY
[facility]  Office: [address], DO, Michaela Simple, DO, Isaiah Hedge, DO, Chikis Forte Blood, DO, Dianne Monterroso MD, Jossie Mesa MD, Shireen Osorio MD, Shiela Parks MD, Judit Bal MD, Pooja Camara MD, Gabe bArams MD, William Javier MD, Sanna Graves MD, Radha Scanlon, DO, Nell Adams MD, Abhay Smith MD, Shalonda Roth DO, Sky Girard MD,  Imani Merrill, DO, Lauryn Antunez MD, Sammy Garcia MD, Maura Sarkar, [facility], [facility], CNP, Chery Cheema, CNP, Hussein Liao, CNS, Josephine Lubin, CNP, Ana Agee, CNP, Ryan Oconnell, CNP, Ivonne Cueto, CNP, Abiel Mills, CNP, Raissa Govea PA-C, Marilin Tavares, [facility], Joycelyn Adams, CNP, Kaye Adams, CNP, Tommie Red, CNP, Pastor Martin, [facility], Richelle Torres, [facility]    Discharge Summary     Patient ID: Clarence Colon  :  1932   MRN: [id number]     ACCOUNT:  [de-identified]   Patient's PCP: LO Jesus CNP  Admit Date: 2020   Discharge Date: 10/2/2020     Length of Stay: 2  Code Status:  Full Code  Admitting Physician: Nell Adams MD  Discharge Physician: Nell Adams MD     Active Discharge Diagnoses:     Hospital Problem Lists:  Principal Problem:    Pleural effusion  Active Problems:    Postoperative hypothyroidism    Essential hypertension    History of prostate cancer    History of malignant neoplasm of thyroid    Lung mass    Dyspnea  Resolved Problems:    * No resolved hospital problems.  *      Admission Condition:  poor     Discharged Condition: fair    Hospital Stay:     Hospital Course:  Clarence Isaiah is a 80 y.o. male who was admitted for the management of   Pleural effusion , presented to ER with Shortness of Breath    Principal Problem:    Pleural effusion status post thoracentesis 10/2/2020     Acute hypoxemic respiratory failure most likely related to large pleural effusion  status post thoracentesis pulmonology following     I do not think patient has underlying pneumonia     Active Problems:    Postoperative hypothyroidism continue home medication       Essential hypertension beta-blocker       History of prostate cancer old records       History of malignant neoplasm of thyroid old records       Lung masses pending heme-onc evaluation CT scan of the abdomen shows liver cyst and kidney cyst  Also patient has Ca 19 was elevated  Follow-up with hematology and pulmonology in 1 week     lymphadenopathy follow-up with hematology      Significant therapeutic interventions:     Significant Diagnostic Studies:   Labs / Micro:  CBC:   Lab Results   Component Value Date    WBC 7.8 10/01/2020    RBC 4.56 10/01/2020    HGB 14.2 10/01/2020    HCT 46.2 10/01/2020    .3 10/01/2020    MCH 31.1 10/01/2020    MCHC 30.7 10/01/2020    RDW 12.0 10/01/2020     10/01/2020     BMP:    Lab Results   Component Value Date    GLUCOSE 91 10/01/2020     10/01/2020    K 4.4 10/01/2020     10/01/2020    CO2 26 10/01/2020    ANIONGAP 12 10/01/2020    BUN 18 10/01/2020    CREATININE 0.61 10/01/2020    BUNCRER NOT REPORTED 10/01/2020    CALCIUM 8.8 10/01/2020    LABGLOM >60 10/01/2020    GFRAA >60 10/01/2020    GFR      10/01/2020    GFR NOT REPORTED 10/01/2020     HFP:    Lab Results   Component Value Date    PROT 5.7 10/01/2020     CMP:    Lab Results   Component Value Date    GLUCOSE 91 10/01/2020     10/01/2020    K 4.4 10/01/2020     10/01/2020    CO2 26 10/01/2020    BUN 18 10/01/2020    CREATININE 0.61 10/01/2020    ANIONGAP 12 10/01/2020    ALKPHOS 47 10/01/2020    ALT 10 10/01/2020    AST 18 10/01/2020    BILITOT 0.59 10/01/2020    LABALBU 3.3 10/01/2020    ALBUMIN 1.4 10/01/2020    LABGLOM >60 10/01/2020    GFRAA >60 10/01/2020    GFR      10/01/2020    GFR NOT REPORTED 10/01/2020    PROT 5.7 10/01/2020    CALCIUM 8.8 10/01/2020     PT/INR:    Lab Results   Component Value Date PROTIME 10.8 10/01/2020    INR 1.0 10/01/2020     PTT:   Lab Results   Component Value Date    APTT 27.6 09/30/2020     FLP:    Lab Results   Component Value Date    HDL 43 04/26/2019     U/A:  No results found for: Ky Oliva, TURBIDITY, SPECGRAV, HGBUR, PHUR, PROTEINU, GLUCOSEU, KETUA, BILIRUBINUR, UROBILINOGEN, NITRU, LEUKOCYTESUR  TSH:    Lab Results   Component Value Date    TSH 0.30 10/01/2020        Radiology:  Xr Chest (2 Vw)    Result Date: 9/29/2020  Interval increase in now moderate right basilar opacity with opacification of the inferior 1/2 of the right hemithorax, findings likely related to increasing moderately large right pleural effusion with underlying atelectasis/infiltrate and or mass RECOMMENDATION: Significant examination results were called to the patient's licensed caregiver     Ct Abdomen Pelvis W Iv Contrast Additional Contrast? None    Result Date: 10/2/2020  Scattered right pleural soft tissue nodules and mediastinal lymphadenopathy reflecting malignancy. Otherwise, no evidence for metastatic disease in the abdomen or pelvis. Ct Chest Pulmonary Embolism W Contrast    Result Date: 9/30/2020  No central or segmental pulmonary embolus. Large right pleural effusion. Multiple masses within the right hemithorax which appear to be pleural based and along the pericardium. Metastatic disease is suspected. Us Thoracentesis    Result Date: 10/1/2020  Successful ultrasound guided thoracentesis. Consultations:    Consults:     Final Specialist Recommendations/Findings:   IP CONSULT TO ONCOLOGY  IP CONSULT TO PULMONOLOGY  IP CONSULT TO HOME CARE NEEDS      The patient was seen and examined on day of discharge and this discharge summary is in conjunction with any daily progress note from day of discharge.     Discharge plan:     Disposition: Home    Physician Follow Up:     Charis Rolon MD  [facility]  [facility] [address] [facility],[address]    In 1 week      [employer] [place] [address] [address] [place] [address]  [phone]    In 1 week         Requiring Further Evaluation/Follow Up POST HOSPITALIZATION/Incidental Findings:     Diet: cardiac diet    Activity: As tolerated    Instructions to Patient:     Discharge Medications:      Medication List      CONTINUE taking these medications    albuterol sulfate  (90 Base) MCG/ACT inhaler  Commonly known as:  Ventolin HFA  Inhale 2 puffs into the lungs every 4 hours as needed for Wheezing or Shortness of Breath     furosemide 20 MG tablet  Commonly known as:  Lasix  Take 1 tablet by mouth daily     levothyroxine 112 MCG tablet  Commonly known as:  SYNTHROID  TAKE 1 TABLET BY MOUTH EVERY DAY     loratadine 10 MG tablet  Commonly known as:  CLARITIN  TAKE 1 TABLET BY MOUTH DAILY     meloxicam 15 MG tablet  Commonly known as:  MOBIC  TAKE 1 TABLET BY MOUTH DAILY     metoprolol tartrate 25 MG tablet  Commonly known as:  LOPRESSOR  TAKE 1 TABLET BY MOUTH TWICE DAILY     simvastatin 40 MG tablet  Commonly known as:  ZOCOR  TAKE 1 TABLET BY MOUTH EVERY NIGHT        STOP taking these medications    levoFLOXacin 500 MG tablet  Commonly known as:  Levaquin            No discharge procedures on file. Time Spent on discharge is  35 mins in patient examination, evaluation, counseling as well as medication reconciliation, prescriptions for required medications, discharge plan and follow up. Electronically signed by   Johny Stahl MD  10/2/2020  3:05 PM      Thank you LO Dumont - CNP for the opportunity to be involved in this patient's care.

## 2020-10-02 NOTE — PROGRESS NOTES
Physical Therapy    Facility/Department: [facility] ONC/MED SURG  Initial Assessment    NAME: Rosita Christopher  : 1932  MRN: [id number]  Chief Complaint   Patient presents with    Shortness of Breath       Date of Service: 10/2/2020    Discharge Recommendations:    Further therapy recommended at discharge for upper level functional and endurance training. PT Equipment Recommendations  Equipment Needed: No    Assessment   Body structures, Functions, Activity limitations: Decreased endurance;Decreased functional mobility   Assessment: Pt ambulated 330ft w/o AD and SBA. The last 30 ft attempted without O2, tolerated well by pt. Pt would benefit from continued PT services to address functional and endurance deficits to prepare for community activities. Prognosis: Good  Decision Making: Medium Complexity  PT Education: Goals;PT Role;Plan of Care;Transfer Training;Gait Training;General Safety  Barriers to Learning: none  REQUIRES PT FOLLOW UP: Yes  Activity Tolerance  Activity Tolerance: Patient Tolerated treatment well;Patient limited by endurance  Activity Tolerance: PT initally only agreeable to room distances, but was agreeable to ambulating floor once standing. SPO2 95-96% upon return to seated. Patient Diagnosis(es): The primary encounter diagnosis was Dyspnea, unspecified type. A diagnosis of Pleural effusion on right was also pertinent to this visit. has a past medical history of Cancer ([address] Hyperlipidemia, and Hypertension. has a past surgical history that includes Prostatectomy; Thyroidectomy; Carpal tunnel release; and Mouth surgery. Restrictions  Restrictions/Precautions  Restrictions/Precautions: General Precautions, Fall Risk  Required Braces or Orthoses?: No  Position Activity Restriction  Other position/activity restrictions:  Up with assist  Vision/Hearing  Vision: Impaired  Vision Exceptions: Wears glasses for distance  [facility]  Hearing Exceptions: Hard of Contact guard assistance  Stand to sit: Contact guard assistance  Ambulation  Ambulation?: Yes  More Ambulation?: Yes  Ambulation 1  Surface: level tile  Device: No Device  Other Apparatus: O2(3L O2 NC)  Assistance: Stand by assistance  Gait Deviations: None  Distance: 300ft  Ambulation 2  Surface - 2: level tile  Device 2: No device  Assistance 2: Stand by assistance  Gait Deviations: None  Distance: 30 ft  Comments: Pt reports feeling a negligable difference in endurance with walking without O2     Balance  Posture: Fair  Sitting - Static: Good  Sitting - Dynamic: Good  Standing - Static: Fair;+  Standing - Dynamic: Fair;+  Comments: Pt performed marching in place, balancing narrow DAVIDE EO x10x, EC x10s, semi tandem stance x10s. Standing balance assessed w/o AD        Plan   Plan  Times per week: 3-5x/wk  Times per day: Daily  Current Treatment Recommendations: Functional Mobility Training, Endurance Training, Gait Training, Home Exercise Program, Balance Training  Safety Devices  Type of devices: All fall risk precautions in place, Gait belt, Left in bed, Call light within reach, Nurse notified  Restraints  Initially in place: No      AM-PAC Score  AM-PAC Inpatient Mobility Raw Score : 23 (10/02/20 1505)  AM-PAC Inpatient T-Scale Score : 56.93 (10/02/20 1505)  Mobility Inpatient CMS 0-100% Score: 11.2 (10/02/20 1505)  Mobility Inpatient CMS G-Code Modifier : CI (10/02/20 1505)          Goals  Short term goals  Time Frame for Short term goals: 6 visits  Short term goal 1: Pt to perform functional transfers and bed mobility with independence  Short term goal 2: Pt to ambulate 400 ft independently w obstacles and uneven surfaces w/o O2 to simulate community ambulation.   Short term goal 3: Pt to demo good standing dynamic balance to reduce risk of falls  Short term goal 4: Pt to tolerate 30 min of PT to demo increased endurance       Therapy Time   Individual Concurrent Group Co-treatment   Time In 1051         Time

## 2020-10-02 NOTE — PROGRESS NOTES
[facility]  Office: [address], DO, Kanu Carpio, DO, Mojgan Kenny, DO, Kia Hairstonsohan Blood, DO, Shawn López MD, Maria Antonia Ramirez MD, Tonny Aguila MD, Ceferino Benoit MD, Maico Alberts MD, Jordy Goldstein MD, Nicky Trejo MD, Edison Lomas MD, Sanna Gagnon MD, Wilder Chang DO, Yaz Park MD, Chata Pavon MD, Traci Barahona DO, Surya Stokes MD,  Yancy Ybarra, DO, Cristine Coburn MD, Clifford Julian MD, Yenifer Campoverde [facility], [facility], CNP, Bo Owens, CNP, Reyes Robles, CNS, Noah Barthel, CNP, Pippa Alfonso, CNP, Claudio Flood, CNP, Greer Santos, CNP, Genie Jiang, CNP, Justyn Naranjo PA-C, [facility], [facility], Archie Blas, CNP, Geovanna Guerrero, CNP, Matheus Ma, CNP, Chino Márquez, CNP, Alice Lopez, CNP         [facility]   [address] [place] [address]    Progress Note    10/2/2020    9:55 AM    Name:   Joaquina De La Rosa  MRN:     [id number]     Acct:      [de-identified]   Room:   [address] [place] [address] Day:  2  Admit Date:  9/30/2020  3:23 PM    PCP:   LO Lr CNP  Code Status:  Full Code    Subjective:     C/C:   Chief Complaint   Patient presents with    Shortness of Breath     Interval History Status: not changed. Patient seen and examined  Shortness of breath has improved patient had thoracentesis on 10/2/20  Patient denies fever chest pain    I am seeing the patient for shortness of breath    Brief History:     Patient was referred to the ER by his primary care physician patient was complaining of shortness of breath CT scan of the chest was done showed lung masses and large pleural effusion    Medications:      Allergies:  No Known Allergies    Current Meds:   Scheduled Meds:    furosemide  20 mg Oral Daily    levothyroxine  112 mcg Oral Daily    cetirizine  10 mg Oral Daily    meloxicam  15 mg Oral Daily    metoprolol tartrate  25 mg Oral BID    atorvastatin  20 mg Oral Daily    sodium chloride flush 10 mL Intravenous 2 times per day    [Held by provider] enoxaparin  40 mg Subcutaneous Daily     Continuous Infusions:   PRN Meds: sodium chloride flush, albuterol, sodium chloride flush, potassium chloride **OR** potassium alternative oral replacement **OR** potassium chloride, magnesium sulfate, acetaminophen **OR** acetaminophen, polyethylene glycol, promethazine **OR** ondansetron    Data:     Past Medical History:   has a past medical history of Cancer ([address] Hyperlipidemia, and Hypertension. Social History:   reports that he quit smoking about 51 years ago. His smoking use included cigarettes. He has a 20.00 pack-year smoking history. He has never used smokeless tobacco. He reports current alcohol use of about 16.0 standard drinks of alcohol per week. He reports that he does not use drugs. Family History:   Family History   Problem Relation Age of Onset    Prostate Cancer Father     Diabetes Brother        Vitals:  BP (!) 148/80   Pulse 65   Temp 97.7 °F (36.5 °C) (Oral)   Resp 16   Ht 5' 9\" (1.753 m)   Wt 215 lb (97.5 kg)   SpO2 98%   BMI 31.75 kg/m²   Temp (24hrs), Av.9 °F (36.6 °C), Min:96.9 °F (36.1 °C), Max:99.1 °F (37.3 °C)    No results for input(s): POCGLU in the last 72 hours. I/O (24Hr):   No intake or output data in the 24 hours ending 10/02/20 0955    Labs:  Hematology:  Recent Labs     20  1550 10/01/20  0649   WBC 7.4 7.8   RBC 4.94 4.56   HGB 15.5 14.2   HCT 46.4 46.2   MCV 94.0 101.3   MCH 31.4 31.1   MCHC 33.4 30.7   RDW 12.8 12.0    148   MPV 9.2 11.1   INR 1.1 1.0     Chemistry:  Recent Labs     20  1550 10/01/20  0649 10/01/20  1145    144  --    K 3.9 4.4  --     106  --    CO2 29 26  --    GLUCOSE 108* 91  --    BUN 19 18  --    CREATININE 0.79 0.61*  --    ANIONGAP 12 12  --    LABGLOM >60 >60  --    GFRAA >60 >60  --    CALCIUM 9.5 8.8  --    PSA  --   --  2.30   PROBNP 384*  --   --    TROPHS 14  --   --      Recent Labs 10/01/20  0649 10/01/20  1145   PROT 5.7*  --    LABALBU 3.3*  --    TSH 0.30  --    AST 18  --    ALT 10  --    LDH  --  252*   ALKPHOS 47  --    BILITOT 0.59  --      ABG:No results found for: POCPH, PHART, PH, POCPCO2, SEE8VQY, PCO2, POCPO2, PO2ART, PO2, POCHCO3, GZO6CYR, HCO3, NBEA, PBEA, BEART, BE, THGBART, THB, IAV8UVT, WVRV5BTY, K3LFMTHJ, O2SAT, FIO2  Lab Results   Component Value Date/Time    SPECIAL NOT REPORTED 10/01/2020 03:50 PM    SPECIAL NOT REPORTED 10/01/2020 03:50 PM     Lab Results   Component Value Date/Time    CULTURE PENDING 10/01/2020 03:50 PM       Radiology:  Xr Chest (2 Vw)    Result Date: 9/29/2020  Interval increase in now moderate right basilar opacity with opacification of the inferior 1/2 of the right hemithorax, findings likely related to increasing moderately large right pleural effusion with underlying atelectasis/infiltrate and or mass RECOMMENDATION: Significant examination results were called to the patient's licensed caregiver     Ct Chest Pulmonary Embolism W Contrast    Result Date: 9/30/2020  No central or segmental pulmonary embolus. Large right pleural effusion. Multiple masses within the right hemithorax which appear to be pleural based and along the pericardium. Metastatic disease is suspected.        Physical Examination:        General appearance:  alert, looks ill  Lungs: Short of breath on oxygen decreased air entry  Heart:  regular rate and rhythm, no murmur  Abdomen:  soft, nontender, nondistended, normal bowel sounds, no masses, hepatomegaly, splenomegaly  Extremities:  no edema, redness, tenderness in the calves  Skin:  no gross lesions, rashes, induration    Assessment:        Hospital Problems           Last Modified POA    * (Principal) Pleural effusion 9/30/2020 Yes    Postoperative hypothyroidism 9/30/2020 Yes    Essential hypertension 9/30/2020 Yes    History of prostate cancer 9/30/2020 Yes    History of malignant neoplasm of thyroid 9/30/2020 Yes    Lung mass 9/30/2020 Yes    Dyspnea 10/1/2020 Yes          Plan:        Principal Problem:    Pleural effusion status post thoracentesis 10/2/2020    Acute hypoxemic respiratory failure most likely related to large pleural effusion on oxygen status post thoracentesis pulmonology following    I do not think patient has underlying pneumonia    Active Problems:    Postoperative hypothyroidism continue home medication      Essential hypertension beta-blocker      History of prostate cancer old records      History of malignant neoplasm of thyroid old records      Lung masses pending heme-onc evaluation CT scan of the abdomen shows liver cyst and kidney cyst  Also patient has CEA was elevated    Chiara Chairez MD  10/2/2020  9:55 AM

## 2020-10-02 NOTE — PROGRESS NOTES
PULMONARY & CRITICAL CARE MEDICINE PROGRESS  NOTE     Patient:  Sammy Curtis  MRN: [id number]  Admit date: 9/30/2020  CODE Status: Full Code     SUBJECTIVE     I personally interviewed/examined the patient, reviewed interval history and interpreted all available radiographic, laboratory data at the time of service. Chief Compliant/Reason for Initial Consult: Right-sided pleural effusion    Brief Hospital Course: The patient is a 80 y.o. 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     Patient also reported poor appetite and intermittent episodes of dry cough since 1 month. No significant weight loss reported. Significant risk factors include 13-pack-year history of smoking quit in 1969  Daily drinker drinking 2-3 drinks every day. Denied exposure to chemicals like asbestos/silica.      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  Patient reports symptomatic improvement after thoracentesis and wants to be discharged home    Review of Systems:  General: negative for chills, fatigue or fever  ENT: negative for headaches, nasal congestion, sore throat or visual changes  Allergy and Immunology: negative for postnasal drip or seasonal allergies  Hematological and Lymphatic: negative for bleeding problems, swollen lymph nodes  Respiratory: positive for cough and shortness of breath negative for hemoptysis or wheezing  Cardiovascular: negative for edema or palpitations  Gastrointestinal: negative for abdominal pain, change in bowel habits or nausea/vomiting  Genito-Urinary: negative for dysuria or urinary frequency/urgency  Musculoskeletal: negative for joint pain or joint swelling  Neurological: negative for numbness/tingling, seizures or weakness  Dermatological: negative for pruritus or rash    OBJECTIVE     VITAL SIGNS:   LAST-  BP (!) 148/80   Pulse 65   Temp 97.7 °F (36.5 °C) (Oral)   Resp 16   Ht 5' 9\" (1.753 m)   Wt 215 lb (97.5 kg)   SpO2 98%   BMI 31.75 kg/m²   8-24 HR RANGE-  TEMP Temp  Av.8 °F (36.6 °C)  Min: 97.7 °F (36.5 °C)  Max: 97.9 °F (38.9 °C)   BP Systolic (63FUX), ARIAN:706 , Min:148 , XLW:746      Diastolic (11JRQ), WIW:22, Min:76, Max:80     PULSE Pulse  Av.5  Min: 65  Max: 88   RR No data recorded   O2 SAT No data recorded   OXYGEN DELIVERY No data recorded     Systemic Examination:   Physical Exam  General appearance - looks comfortable and in no acute distress  Mental status - alert, oriented to person, place, and time  Eyes - pupils equal and reactive, extraocular eye movements intact  Mouth - mucous membranes moist, pharynx normal without lesions  Neck - supple, no significant adenopathy  Chest - Chest was symmetrical without dullness to percussion. Breath sounds bilaterally were clear to auscultation. There were no wheezes, rhonchi or rales.   There is no intercostal recession or use of accessory muscles  Heart - normal rate, regular rhythm, normal S1, S2, no murmurs, rubs, clicks or gallops  Abdomen - soft, nontender, nondistended, no masses or organomegaly  Neurological - alert, oriented, normal speech, no focal findings or movement disorder noted  Extremities - peripheral pulses normal, no pedal edema, no clubbing or cyanosis  Skin - normal coloration and turgor, no rashes, no suspicious skin lesions noted     DATA REVIEW     Medications:  Scheduled Meds:   furosemide  20 mg Oral Daily    levothyroxine  112 mcg Oral Daily    cetirizine  10 mg Oral Daily    meloxicam  15 mg Oral Daily    metoprolol tartrate  25 mg Oral BID    atorvastatin  20 mg Oral Daily    sodium chloride flush  10 mL Intravenous 2 times per day    [Held by provider] enoxaparin  40 mg Subcutaneous Daily     Continuous Infusions:    LABS:-  ABG:   No results for input(s): POCPH, POCPCO2, POCPO2, POCHCO3, GDSH6AEB in the last 72 hours. CBC:   Recent Labs     09/30/20  1550 10/01/20  0649   WBC 7.4 7.8   HGB 15.5 14.2   HCT 46.4 46.2   MCV 94.0 101.3    148   LYMPHOPCT 14*  --    RBC 4.94 4.56   MCH 31.4 31.1   MCHC 33.4 30.7   RDW 12.8 12.0     BMP:   Recent Labs     09/30/20  1550 10/01/20  0649    144   K 3.9 4.4    106   CO2 29 26   BUN 19 18   CREATININE 0.79 0.61*   GLUCOSE 108* 91     Liver Function Test:   Recent Labs     10/01/20  0649   PROT 5.7*   LABALBU 3.3*   ALT 10   AST 18   ALKPHOS 47   BILITOT 0.59     Amylase/Lipase:  No results for input(s): AMYLASE, LIPASE in the last 72 hours. Coagulation Profile:   Recent Labs     09/30/20  1550 10/01/20  0649   INR 1.1 1.0   PROTIME 11.1 10.8   APTT 27.6  --      Cardiac Enzymes:  No results for input(s): CKTOTAL, CKMB, CKMBINDEX, TROPONINI in the last 72 hours.   Lactic Acid:  No results found for: LACTA  BNP:   No results found for: BNP  D-Dimer:  Lab Results   Component Value Date    DDIMER 0.71 09/10/2020     Others:   Lab Results   Component Value Date    TSH 0.30 10/01/2020     No results found for: MIYA, RHEUMFACTOR, SEDRATE, CRP  No results found for: Joleen Mackintosh  No results found for: IRON, TIBC, FERRITIN  No results found for: SPEP, UPEP  Lab Results   Component Value Date    PSA 2.30 10/01/2020    CEA 0.9 10/01/2020     45 10/01/2020       Input/Output:  No intake or output data in the 24 hours ending 10/02/20 1551    Microbiology:  Urine Culture:  No components found for: CURINE  Blood Culture:  No components found for: CBLOOD, CFUNGUSBL  Stool Culture:  No components found for: CSTOOL  Recent Labs     10/01/20  1550   SPECDESC . THORACENTESIS FLUID  .BODY FLUID   SPECIAL NOT REPORTED  NOT REPORTED   CULTURE PENDING       Radiology images:    Radiology reports:  CT ABDOMEN PELVIS W IV CONTRAST Additional Contrast? None   Preliminary Result   Scattered right pleural soft tissue nodules and mediastinal lymphadenopathy   reflecting malignancy. Otherwise, no evidence for metastatic disease in the abdomen or pelvis. US THORACENTESIS   Final Result   Successful ultrasound guided thoracentesis. CT CHEST PULMONARY EMBOLISM W CONTRAST   Final Result   No central or segmental pulmonary embolus. Large right pleural effusion. Multiple masses within the right hemithorax which appear to be pleural based   and along the pericardium. Metastatic disease is suspected. Echocardiogram:   No results found for this or any previous visit. Cardiac Catheterization:   No results found for this or any previous visit. 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  // Former smoker    Plan:     Patient remains hemodynamically stable and is currently saturating well on nasal cannula   He reports intermittent improvement after thoracentesis   Pleural fluid cytology is positive for adenocarcinoma of lung origin   Oncology has been consulted, patient will be candidate for systemic chemotherapy   If the pleural fluid recurs he will need tunneled indwelling pleural catheter   Okay to be discharged home   Follow-up in pulmonary clinic in 1 to 2 weeks   Will need home O2 evaluation at the time of discharge    It was my pleasure to evaluate Tosha Silverman today. We will continue to follow. I would like to thank you for allowing me to participate in the care of this patient.   Please feel free to call with any further questions or concerns. Charis Rolon M.D. Pulmonary and Critical Care Medicine           10/2/2020, 3:51 PM    Please note that this chart was generated using voice recognition Dragon dictation software. Although every effort was made to ensure the accuracy of this automated transcription, some errors in transcription may have occurred.

## 2020-10-03 LAB
CHOLESTEROL FLUID: 54 MG/DL
SOURCE: NORMAL

## 2020-10-06 LAB
CULTURE: NORMAL
DIRECT EXAM: NORMAL
DIRECT EXAM: NORMAL
Lab: NORMAL
SPECIMEN DESCRIPTION: NORMAL

## 2020-10-07 ENCOUNTER — TELEPHONE (OUTPATIENT)
Dept: ONCOLOGY | Age: 85
End: 2020-10-07

## 2020-10-07 ENCOUNTER — TELEPHONE (OUTPATIENT)
Dept: CASE MANAGEMENT | Age: 85
End: 2020-10-07

## 2020-10-07 NOTE — TELEPHONE ENCOUNTER
Iza called office stating Dr Filemon Vu rounded on patient in house and informed her that he wanted [facility] testing performed on pleural fluid. Writer completed [employer] form, printed on path results, demographics, progress note from in house, and insurance card and placed everything on a clipboard for Dr Filemon Vu to complete form and sign. Nathanielflaca Marcial stated she will inform patient of peripheral [facility] draw and patient will complete when he gets his brain MRI performed. Writer informed [facility] that blood must be drawn between Mon-Thurs from 8am-2pm. Pati communicated understanding.  George Gadrner

## 2020-10-09 ENCOUNTER — TELEPHONE (OUTPATIENT)
Dept: CASE MANAGEMENT | Age: 85
End: 2020-10-09

## 2020-10-09 NOTE — TELEPHONE ENCOUNTER
Name: Natan Dinero  : 1932  MRN: [id number]    Oncology Navigation Follow-Up Note  Navigator left message for pt.  Directing him to stop in [place] 10/13 before or after MRI Brain for molecular testing-Triage aware of pt needing testing  Electronically signed by Saqib Larson RN on 10/9/2020 at 11:52 AM

## 2020-10-12 ENCOUNTER — OFFICE VISIT (OUTPATIENT)
Dept: PULMONOLOGY | Age: 85
End: 2020-10-12
Payer: MEDICARE

## 2020-10-12 ENCOUNTER — HOSPITAL ENCOUNTER (OUTPATIENT)
Age: 85
Discharge: HOME | End: 2020-10-14
Payer: MEDICARE

## 2020-10-12 ENCOUNTER — HOSPITAL ENCOUNTER (OUTPATIENT)
Dept: GENERAL RADIOLOGY | Age: 85
Discharge: HOME | End: 2020-10-14
Payer: MEDICARE

## 2020-10-12 VITALS
RESPIRATION RATE: 16 BRPM | SYSTOLIC BLOOD PRESSURE: 152 MMHG | DIASTOLIC BLOOD PRESSURE: 73 MMHG | HEART RATE: 51 BPM | HEIGHT: 69 IN | OXYGEN SATURATION: 96 % | TEMPERATURE: 98.5 F | BODY MASS INDEX: 23.85 KG/M2 | WEIGHT: 161 LBS

## 2020-10-12 PROCEDURE — G8484 FLU IMMUNIZE NO ADMIN: HCPCS | Performed by: INTERNAL MEDICINE

## 2020-10-12 PROCEDURE — G8427 DOCREV CUR MEDS BY ELIG CLIN: HCPCS | Performed by: INTERNAL MEDICINE

## 2020-10-12 PROCEDURE — 1123F ACP DISCUSS/DSCN MKR DOCD: CPT | Performed by: INTERNAL MEDICINE

## 2020-10-12 PROCEDURE — G8420 CALC BMI NORM PARAMETERS: HCPCS | Performed by: INTERNAL MEDICINE

## 2020-10-12 PROCEDURE — 1111F DSCHRG MED/CURRENT MED MERGE: CPT | Performed by: INTERNAL MEDICINE

## 2020-10-12 PROCEDURE — 4040F PNEUMOC VAC/ADMIN/RCVD: CPT | Performed by: INTERNAL MEDICINE

## 2020-10-12 PROCEDURE — 71046 X-RAY EXAM CHEST 2 VIEWS: CPT

## 2020-10-12 PROCEDURE — 1036F TOBACCO NON-USER: CPT | Performed by: INTERNAL MEDICINE

## 2020-10-12 PROCEDURE — 99214 OFFICE O/P EST MOD 30 MIN: CPT | Performed by: INTERNAL MEDICINE

## 2020-10-12 RX ORDER — LORATADINE 10 MG/1
10 TABLET ORAL DAILY
Qty: 30 TABLET | Refills: 2 | Status: SHIPPED | OUTPATIENT
Start: 2020-10-12 | End: 2021-01-01 | Stop reason: SDUPTHER

## 2020-10-12 RX ORDER — MELOXICAM 15 MG/1
15 TABLET ORAL DAILY
Qty: 30 TABLET | Refills: 3 | Status: SHIPPED | OUTPATIENT
Start: 2020-10-12 | End: 2021-01-01 | Stop reason: SDUPTHER

## 2020-10-12 NOTE — PROGRESS NOTES
PULMONARY MEDICINE OUTPATIENT CONSULT NOTE     PATIENT:  Natan Dinero  YOB: 1932  MRN: [id number]     REFERRED BY: LO Lizarraga CNP   CHIEF COMPLIANT: Follow-Up from Hospital ([facility])       HISTORY     HISTORY OF PRESENT ILLNESS:   Natan Dinero is a 80y.o. year old male here for establishing care for after recent hospitalization with right-sided pleural effusion. He underwent thoracentesis (10/1) and the pleural fluid cytology was positive for adenocarcinoma of lung primary. He was discharged on home O2 at 2 L/min. As per patient's daughter accompanying him today, his breathing has progressively worsened and is requiring up to 4 L oxygen while walking. He denies any fever, pleuritic chest pain, wheezing or cough. He is scheduled to get an MRI tomorrow and will follow up with Dr. Haider Granado after that. PAST MEDICAL HISTORY:      Diagnosis Date    Cancer [facility])     prostate, thyroid    Hyperlipidemia     Hypertension      PAST SURGICAL HISTORY:      Procedure Laterality Date    CARPAL TUNNEL RELEASE      MOUTH SURGERY      PROSTATECTOMY      THYROIDECTOMY       FAMILY HISTORY:      Problem Relation Age of Onset    Prostate Cancer Father     Diabetes Brother      SOCIAL HISTORY:  TOBACCO:   reports that he quit smoking about 51 years ago. His smoking use included cigarettes. He has a 20.00 pack-year smoking history. He has never used smokeless tobacco.  ETOH:   reports current alcohol use of about 16.0 standard drinks of alcohol per week. DRUGS: reports no history of drug use.      AVOCATION/OCCUPATIONAL EXPOSURE:  None    IMMUNIZATION HISTORY:  Immunization History   Administered Date(s) Administered    Influenza, High Dose (Fluzone 65 yrs and older) 10/01/2019    Zoster Recombinant (Shingrix) 03/12/2019        ALLERGIES:  No Known Allergies   MEDICATIONS:  Outpatient Medications Prior to Visit   Medication Sig Dispense Refill    furosemide (LASIX) 20 MG tablet Take 1 tablet by mouth daily 30 tablet 3    loratadine (CLARITIN) 10 MG tablet TAKE 1 TABLET BY MOUTH DAILY 30 tablet 2    metoprolol tartrate (LOPRESSOR) 25 MG tablet TAKE 1 TABLET BY MOUTH TWICE DAILY 60 tablet 3    meloxicam (MOBIC) 15 MG tablet TAKE 1 TABLET BY MOUTH DAILY 30 tablet 3    simvastatin (ZOCOR) 40 MG tablet TAKE 1 TABLET BY MOUTH EVERY NIGHT 90 tablet 2    levothyroxine (SYNTHROID) 112 MCG tablet TAKE 1 TABLET BY MOUTH EVERY DAY 90 tablet 2    albuterol sulfate HFA (VENTOLIN HFA) 108 (90 Base) MCG/ACT inhaler Inhale 2 puffs into the lungs every 4 hours as needed for Wheezing or Shortness of Breath (Patient not taking: Reported on 10/12/2020) 3 Inhaler 1     No facility-administered medications prior to visit.          REVIEW OF SYSTEMS:   General: negative for - chills, fever, or sleep disturbance   Psychological: negative for - anxiety or depression   Ophthalmic: negative for - dry or itchy eyes, or loss of vision   ENT: negative   Allergy and Immunology: negative   Hematological and Lymphatic: negative for - bleeding problems, jaundice, or swollen lymph nodes   Endocrine: negative for - polydipsia/polyuria or temperature intolerance   Respiratory: positive for - shortness of breath   Cardiovascular: negative for - chest pain or palpitations   Gastrointestinal: negative for - change in bowel habits, nausea/vomiting, or swallowing difficulty/pain   Genito-Urinary: negative for - dysuria or urinary frequency/urgency   Musculoskeletal: negative for - joint pain or joint swelling   Neurological: negative for - numbness/tingling or weakness   Dermatological: negative for - rash or skin lesion changes      PHYSICAL EXAMINATION      VITAL SIGNS:   BP (!) 152/73 (Site: Left Upper Arm, Position: Sitting, Cuff Size: Medium Adult)   Pulse 51   Temp 98.5 °F (36.9 °C) (Temporal)   Resp 16   Ht 5' 9\" (1.753 m)   Wt 161 lb (73 kg)   SpO2 96% Comment: 2L  BMI 23.78 kg/m²   Wt Readings from Last 3 Encounters:   10/12/20 161 lb (73 kg)   09/30/20 215 lb (97.5 kg)   10/24/19 207 lb 12.8 oz (94.3 kg)     SYSTEMIC EXAMINATION:   General appearance - alert, mild respiratory distress   Eyes - sclera anicteric, no conjunctival injection injection   Neck/Lymphatics - supple, no significant adenopathy, carotids upstroke normal bilaterally, no bruits   Chest- decreased air entry noted over right hemithorax   Heart - normal rate, regular rhythm, normal S1, S2, no murmurs, rubs, clicks or gallops   Abdomen - soft, nontender, non distended   Neurological/Psych- motor and sensory grossly normal bilaterally, alert, oriented to person, place, and time   Musculoskeletal/Extremities- no joint tenderness or swelling, peripheral pulses normal, no pedal edema, no clubbing or cyanosis   Skin - normal coloration and turgor, no rashes, no suspicious skin lesions noted     LABORATORY DATA      LABS:  ABG:   No results found for: PH, PHART, PCO2, LIE2NKE, PO2, PO2ART, HCO3, EZE8LFT, BE, BEART, THGB, U2BUCNUT  VBG:  No results found for: PHVEN, LXL8BBT, BEVEN, C9GWERBB  CBC:   Lab Results   Component Value Date    WBC 7.8 10/01/2020    RBC 4.56 10/01/2020    HGB 14.2 10/01/2020    HCT 46.2 10/01/2020     10/01/2020    .3 10/01/2020    MCH 31.1 10/01/2020    MCHC 30.7 10/01/2020    RDW 12.0 10/01/2020    LYMPHOPCT 14 (L) 09/30/2020    MONOPCT 11 09/30/2020    BASOPCT 1 09/30/2020    MONOSABS 0.80 09/30/2020    LYMPHSABS 1.00 09/30/2020    EOSABS 0.10 09/30/2020    BASOSABS 0.00 09/30/2020    DIFFTYPE NOT REPORTED 09/30/2020     Eosinophils/IgE:   Lab Results   Component Value Date    EOSABS 0.10 09/30/2020     Alpha 1 antitrypsin: No results found for: A1A  CMP:   Lab Results   Component Value Date     10/01/2020    K 4.4 10/01/2020     10/01/2020    CO2 26 10/01/2020    BUN 18 10/01/2020    CREATININE 0.61 (L) 10/01/2020    GLUCOSE 91 10/01/2020    CALCIUM 8.8 10/01/2020    PROT 5.7 (L) 10/01/2020 LABALBU 3.3 (L) 10/01/2020    BILITOT 0.59 10/01/2020    BILIDIR 0.18 04/26/2019    IBILI 0.56 04/26/2019    ALT 10 10/01/2020    AST 18 10/01/2020    ALKPHOS 47 10/01/2020     Coagulation Profile:   Lab Results   Component Value Date    INR 1.0 10/01/2020    PROTIME 10.8 10/01/2020    APTT 27.6 09/30/2020     BNP:   Lab Results   Component Value Date    PROBNP 384 (H) 09/30/2020     D-Dimer/Fibrinogen:  Lab Results   Component Value Date    DDIMER 0.71 09/10/2020     Others labs:  Lab Results   Component Value Date    TSH 0.30 10/01/2020     No results found for: MIYA, ANATITER, RHEUMFACTOR, RF, C3, C4, MPO, PR3, SEDRATE, CRP  Lab Results   Component Value Date     (H) 10/01/2020     Lab Results   Component Value Date    PSA 2.30 10/01/2020    CEA 0.9 10/01/2020     45 (H) 10/01/2020     No results found for: RPR, HIV    RADIOLOGY:  Xr Chest (2 Vw)    Result Date: 9/29/2020  EXAMINATION: TWO XRAY VIEWS OF THE CHEST 9/29/2020 12:56 pm COMPARISON: October 10, 2020, chest exam HISTORY: ORDERING SYSTEM PROVIDED HISTORY: Shortness of breath TECHNOLOGIST PROVIDED HISTORY: shortness of breath Reason for Exam: short of breath, wheezing Acuity: Acute Type of Exam: Initial FINDINGS: Stable normal cardiopericardial silhouette Clear left and right upper lungs Interval increase in now moderate right basilar opacity with opacification of the inferior 1/2 of the right hemithorax.  Stable pleural thickening lateral aspect of the right chest Degenerative changes of the thoracic spine     Interval increase in now moderate right basilar opacity with opacification of the inferior 1/2 of the right hemithorax, findings likely related to increasing moderately large right pleural effusion with underlying atelectasis/infiltrate and or mass RECOMMENDATION: Significant examination results were called to the patient's licensed caregiver     Ct Abdomen Pelvis W Iv Contrast Additional Contrast? None    Result Date: 10/2/2020  EXAMINATION: CT OF THE ABDOMEN AND PELVIS WITH CONTRAST, 10/2/2020 8:35 am TECHNIQUE: CT of the abdomen and pelvis was performed with the administration of intravenous contrast. Multiplanar reformatted images are provided for review. Dose modulation, iterative reconstruction, and/or weight based adjustment of the mA/kV was utilized to reduce the radiation dose to as low as reasonably achievable. COMPARISON: CT chest September 30, 2020 HISTORY: ORDERING SYSTEM PROVIDED HISTORY: Rt large pleural effusion, mets to pleura and pericardium. TECHNOLOGIST PROVIDED HISTORY: Rt large pleural effusion, mets to pleura and pericardium. Reason for Exam: Rt large pleural effusion, mets to pleura and pericardium. Acuity: Unknown Type of Exam: Unknown FINDINGS: Lower Chest: There has been interval reduction of the moderate-large right pleural effusion. Large pleural soft tissue nodules and mediastinal lymphadenopathy redemonstrated. The right lower lobe remains collapse/atelectatic. Organs: There is a 2.8 cm hepatic cyst.  No concerning liver lesion. No calcified gallstones or biliary ductal dilatation. The spleen is normal in size without focal lesion. The pancreas and the adrenal glands are unremarkable. The kidneys enhance symmetrically without collecting system dilatation. There is a 3.2 cm left lower pole renal cyst.  Additional predominantly subcentimeter low-density renal lesions are too small to characterize but probably also reflect cysts. GI/Bowel: The appendix is normal.  No bowel obstruction. Moderate distal colonic diverticulosis without evidence for diverticulitis. Pelvis: There has been prior prostatectomy. The urinary bladder is unremarkable. Peritoneum/Retroperitoneum: No abdominal lymphadenopathy or ascites. Bones/Soft Tissues: Diffuse osteopenia. No suspicious blastic/lytic osseous lesion. Scattered right pleural soft tissue nodules and mediastinal lymphadenopathy reflecting malignancy. Otherwise, no evidence for metastatic disease in the abdomen or pelvis. Ct Chest Pulmonary Embolism W Contrast    Result Date: 9/30/2020  EXAMINATION: CTA OF THE CHEST 9/30/2020 4:27 pm TECHNIQUE: CTA of the chest was performed after the administration of intravenous contrast.  Multiplanar reformatted images are provided for review. MIP images are provided for review. Dose modulation, iterative reconstruction, and/or weight based adjustment of the mA/kV was utilized to reduce the radiation dose to as low as reasonably achievable. COMPARISON: Chest radiograph September 29, 2020 HISTORY: ORDERING SYSTEM PROVIDED HISTORY: Dyspnea TECHNOLOGIST PROVIDED HISTORY: Dyspnea Reason for Exam:  Recent SOB. Hx of HTN, prostate and thyroid cancer Acuity: Acute Type of Exam: Initial FINDINGS: Chest wall: No axillary adenopathy. Thyroidectomy. Upper Abdomen: No adrenal nodule. Cyst within the caudate. Mediastinum: Mild cardiomegaly. No pericardial effusion. Multiple enlarged pericardial lymph nodes, for example series 5 image 194 there is a 17 mm nodule. No mediastinal or hilar adenopathy. Pulmonary Arteries: No central or segmental pulmonary embolus. Lungs: Large right pleural effusion. No left pleural effusion. Right lower lobe opacity may represent atelectasis. Multiple pleural-based masses are seen along the right upper lobe and right lung apex. Along the pericardium and pleural surface within the middle lobe there is more focal thickening and nodularity. For example refer to series 5, image 177 where there is a conglomerate mass measuring 2.5 cm. Bones: Thoracic spine degenerative changes. No suspicious osseous findings. No central or segmental pulmonary embolus. Large right pleural effusion. Multiple masses within the right hemithorax which appear to be pleural based and along the pericardium. Metastatic disease is suspected.      Us Thoracentesis    Result Date: 10/1/2020  PROCEDURE: Jamee Halsted RIGHT THORACENTESIS 10/1/2020 HISTORY: ORDERING SYSTEM PROVIDED HISTORY: thoracentesis TECHNOLOGIST PROVIDED HISTORY: thoracentesis Right pleural effusion TECHNIQUE: This procedure was performed by Porter Hewitt PA-C under indirect supervision of Dr. Bob Graham. While the patient was seated upright, after localizing, marking and anesthetizing the posterior right lower chest with one percent lidocaine, a 5 Western Roxie Yueh needle was advanced under ultrasound guidance. Some sonogram image demonstrate safe tract access. The catheter was advanced and the needle was removed. The catheter was connected to a Vacutainer bottle and 1500 mL of serosanguineous fluidwas drained. Postprocedural ultrasound demonstrated significant residual fluid. The catheter was removed and the site was dressed appropriately. A sample was sent for laboratory analysis. Estimated blood loss was minimum. The patient tolerated the procedure well and left the Department in stable condition. FINDINGS: A total of 1500 mL of serosanguineous fluid was removed. Successful ultrasound guided thoracentesis. PATHOLOGY: 10/1/2020    RIGHT THORACENTESIS FLUID:          METASTATIC ADENOCARCINOMA, COMPATIBLE WITH LUNG PRIMARY.        PULMONARY FUNCTION TEST:  No results found for: FEV1, FVC, [id number], TLC, DLCO    ECHOCARDIOGRAM:   No results found for this or any previous visit. CARDIAC CATHETERIZATION:  No results found for this or any previous visit. ASSESSMENT AND PLAN     ASSESSMENT:    ICD-10-CM    1. Recurrent pleural effusion on right  J90 IR CHEST TUBE INSERTION     XR CHEST STANDARD (2 VW)   2.  Pleural effusion, malignant  J91.0      PLAN/RECOMMENDATIONS:     Pulmonary function tests unavailable   Ordered stat chest x-ray, will follow up on results   Patient likely has recurrent pleural effusion   He will benefit from indwelling pleural catheter (Pleurx versus Peola Palma), will try to get this scheduled   Patient received counseling on the following healthy behaviors: nutrition, exercise and medication adherence   Maintain an active lifestyle  Lung Cancer Screening: After reviewing the patient's smoking history, age and other clinical criteria, the LOW DOSE CT is : NOT INDICATED; Age is < 54 or > 77 years, NOT INDICATED; Quit smoking >15 years ago and NOT INDICATED; Signs or symptoms of Lung cancer   Recommend influenza vaccination in the fall annually; Refused   Recommendations were given regarding pneumococcal vaccination; previously administered after age 72- no need to repeat    All the questions that the patient had were answered to his satisfaction  We'll see the patient back in two months  Thank you for having us involved in the care of your patient. Please call us if you have any questions or concerns. Luis Lindsay MD    Pulmonary and Critical Care Medicine          10/12/2020, 5:00 PM    Please note that this chart was generated using voice recognition Dragon dictation software. Although every effort was made to ensure the accuracy of this automated transcription, some errors in transcription may have occurred.

## 2020-10-12 NOTE — PATIENT INSTRUCTIONS
Will call IR in the morning to get chest tube scheduled pending Xray results. Call Daughter Jaclyn Munoz ([phone]) with the place and time of chest tube placement anything on Wednesday after Tony Hutchinson  Was unable to schedule for Wednesday due to pt having to get Covid test first. Covid test on the 15 and chest tube scheduled for Monday the 19 @ [facility]. Daughter knows that if anything gets worse with breathing to take pt to the ER.  RS

## 2020-10-13 ENCOUNTER — TELEPHONE (OUTPATIENT)
Dept: ONCOLOGY | Age: 85
End: 2020-10-13

## 2020-10-13 ENCOUNTER — HOSPITAL ENCOUNTER (OUTPATIENT)
Dept: MRI IMAGING | Age: 85
Discharge: HOME | End: 2020-10-15
Payer: MEDICARE

## 2020-10-13 ENCOUNTER — HOSPITAL ENCOUNTER (OUTPATIENT)
Age: 85
Discharge: HOME | End: 2020-10-13
Payer: MEDICARE

## 2020-10-13 PROCEDURE — 6360000004 HC RX CONTRAST MEDICATION: Performed by: INTERNAL MEDICINE

## 2020-10-13 PROCEDURE — 70553 MRI BRAIN STEM W/O & W/DYE: CPT

## 2020-10-13 PROCEDURE — 36415 COLL VENOUS BLD VENIPUNCTURE: CPT

## 2020-10-13 PROCEDURE — A9579 GAD-BASE MR CONTRAST NOS,1ML: HCPCS | Performed by: INTERNAL MEDICINE

## 2020-10-13 RX ADMIN — GADOTERIDOL 15 ML: 279.3 INJECTION, SOLUTION INTRAVENOUS at 11:17

## 2020-10-13 NOTE — TELEPHONE ENCOUNTER
DR. Regi Hinds ASKED ME TO CALL PATIENT TO SEE IF HE CAN COME IN ON Thursday IN THE MORNING. I LEFT MESSAGE FOR PATIENT TO CALL THE OFFICE BACK.

## 2020-10-14 ENCOUNTER — TELEPHONE (OUTPATIENT)
Dept: ONCOLOGY | Age: 85
End: 2020-10-14

## 2020-10-14 LAB
SEND OUT REPORT: NORMAL
TEST NAME: NORMAL

## 2020-10-15 ENCOUNTER — TELEPHONE (OUTPATIENT)
Dept: ONCOLOGY | Age: 85
End: 2020-10-15

## 2020-10-15 ENCOUNTER — OFFICE VISIT (OUTPATIENT)
Dept: ONCOLOGY | Age: 85
End: 2020-10-15
Payer: MEDICARE

## 2020-10-15 ENCOUNTER — HOSPITAL ENCOUNTER (OUTPATIENT)
Dept: PREADMISSION TESTING | Age: 85
Setting detail: SPECIMEN
Discharge: HOME | End: 2020-10-19
Payer: MEDICARE

## 2020-10-15 ENCOUNTER — TELEPHONE (OUTPATIENT)
Dept: CASE MANAGEMENT | Age: 85
End: 2020-10-15

## 2020-10-15 VITALS
SYSTOLIC BLOOD PRESSURE: 101 MMHG | BODY MASS INDEX: 28.14 KG/M2 | RESPIRATION RATE: 20 BRPM | HEART RATE: 57 BPM | TEMPERATURE: 97.5 F | WEIGHT: 190 LBS | HEIGHT: 69 IN | DIASTOLIC BLOOD PRESSURE: 56 MMHG

## 2020-10-15 PROBLEM — C34.91 NON-SMALL CELL CARCINOMA OF RIGHT LUNG, STAGE 4 (HCC): Status: ACTIVE | Noted: 2020-10-15

## 2020-10-15 PROCEDURE — 1036F TOBACCO NON-USER: CPT | Performed by: INTERNAL MEDICINE

## 2020-10-15 PROCEDURE — 4040F PNEUMOC VAC/ADMIN/RCVD: CPT | Performed by: INTERNAL MEDICINE

## 2020-10-15 PROCEDURE — 1111F DSCHRG MED/CURRENT MED MERGE: CPT | Performed by: INTERNAL MEDICINE

## 2020-10-15 PROCEDURE — 1123F ACP DISCUSS/DSCN MKR DOCD: CPT | Performed by: INTERNAL MEDICINE

## 2020-10-15 PROCEDURE — G8417 CALC BMI ABV UP PARAM F/U: HCPCS | Performed by: INTERNAL MEDICINE

## 2020-10-15 PROCEDURE — G8484 FLU IMMUNIZE NO ADMIN: HCPCS | Performed by: INTERNAL MEDICINE

## 2020-10-15 PROCEDURE — G8427 DOCREV CUR MEDS BY ELIG CLIN: HCPCS | Performed by: INTERNAL MEDICINE

## 2020-10-15 PROCEDURE — 99211 OFF/OP EST MAY X REQ PHY/QHP: CPT

## 2020-10-15 PROCEDURE — 99215 OFFICE O/P EST HI 40 MIN: CPT | Performed by: INTERNAL MEDICINE

## 2020-10-15 NOTE — PROGRESS NOTES
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 Pleural fluid cytology positive for adenocarcinoma  Multiple masses in the right hemithorax which are pleural based and along the pericardium. TREATMENT HISTORY:  Next-generation testing with TEMPUS is ordered  Patient planning to have Pleurx catheter placement on 10/19  Patient not a candidate for aggressive chemotherapy therefore we will plan single agent Keytruda, regardless of PD-L1 expression results  INTERVAL HISTORY[de-identified]   Patient is returning for follow-up visit and to discuss further recommendations. He had MRI of the brain which was negative for metastasis. He still has shortness of breath and using oxygen 3 L at home. He has recurrent right-sided pleural effusion and is following with pulmonologist Dr. Dorinda Sandhu and planning to have Pleurx catheter placement on Monday. He denies any pain. No nausea vomiting. During this visit patient's allergy, social, medical, surgical history and medications were reviewed and updated. HPI in Brief:   The patient is a 80 y.o.  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     [facility] oncology was consulted for suspicion of metastatic disease.   Unknown primary.     Patient also reported poor appetite and intermittent episodes of dry cough since 1 month.  No significant weight loss reported. Significant risk factors include 13-pack-year history of smoking quit in 1969  Daily drinker drinking 2-3 drinks every day. Denied exposure to chemicals like asbestos/silica  Evaluated by pulmonology. Had IR guided thoracentesis. Surg path report showed adenocarcinoma of lung. Review of systems  General: no fever or night sweats, Weight is stable. ENT: No double or blurred vision, no tinnitus or hearing problem, no dysphagia or sore throat   Respiratory: No chest pain, no shortness of breath, no cough or hemoptysis. Cardiovascular: Denies chest pain, PND or orthopnea. No L E swelling or palpitations. Gastrointestinal:    No nausea or vomiting, abdominal pain, diarrhea or constipation. Genitourinary: Denies dysuria, hematuria, frequency, urgency or incontinence. Neurological: Denies headaches, decreased LOC, no sensory or motor focal deficits. Medications:   Reviewed in Epic     Objective:   Vitals: BP (!) 101/56   Pulse 57   Temp 97.5 °F (36.4 °C) (Oral)   Resp 20   Ht 5' 9\" (1.753 m)   Wt 190 lb (86.2 kg)   BMI 28.06 kg/m²     General appearance - well appearing, on 2 L nasal cannula. Mental status - alert and cooperative   Mouth - mucous membranes moist  Neck -Nno significant adenopathy, s/p resection of thyroid  Lymphatics - no palpable lymphadenopathy. Chest -bilaterally diminished breath sounds at bases. Bilateral expiratory wheezes noticed.   Heart - normal rate, regular rhythm, normal S1, S2, no murmurs  Abdomen - soft, nontender, nondistended, no masses or organomegaly   Neurological - alert, oriented, normal speech, no focal findings   Musculoskeletal - no joint tenderness, deformity or swelling   Extremities - peripheral pulses normal, no pedal edema, no clubbing or cyanosis   Skin - normal coloration and turgor, no rashes, no suspicious skin lesions noted ,     Data:  Lab Results   Component Value Date    WBC 7.8 10/01/2020 HGB 14.2 10/01/2020    HCT 46.2 10/01/2020    .3 10/01/2020     10/01/2020     Lab Results   Component Value Date     10/01/2020    K 4.4 10/01/2020     10/01/2020    CO2 26 10/01/2020    BUN 18 10/01/2020    CREATININE 0.61 (L) 10/01/2020    GLUCOSE 91 10/01/2020    CALCIUM 8.8 10/01/2020    PROT 5.7 (L) 10/01/2020    LABALBU 3.3 (L) 10/01/2020    BILITOT 0.59 10/01/2020    ALKPHOS 47 10/01/2020    AST 18 10/01/2020    ALT 10 10/01/2020    LABGLOM >60 10/01/2020    GFRAA >60 10/01/2020    GLOB NOT REPORTED 04/26/2019       Surgical Pathology Report   Surgical Pathology   Collected:  10/01/20 1600    Lab status:  Final    Resulting lab:  [facility]    Value:  -- Diagnosis --   RIGHT THORACENTESIS FLUID:          METASTATIC ADENOCARCINOMA, COMPATIBLE WITH LUNG PRIMARY. IMAGING DATA:  CT chest PE   No central or segmental pulmonary embolus.         Large right pleural effusion.         Multiple masses within the right hemithorax which appear to be pleural based    and along the pericardium.  Metastatic disease is suspected      Chest x-ray 9/29/2020  Interval increase in now moderate right basilar opacity with opacification of    the inferior 1/2 of the right hemithorax, findings likely related to    increasing moderately large right pleural effusion with underlying    atelectasis/infiltrate and or mass       CT abd pelvis- 10/2/20  Impression    Scattered right pleural soft tissue nodules and mediastinal lymphadenopathy    reflecting malignancy.         Otherwise, no evidence for metastatic disease in the abdomen or pelvis. MR brain  Impression    Unremarkable MRI of brain without abnormal postcontrast enhancement. PMH:  Past Medical History:   Diagnosis Date    [facility])     prostate, thyroid    Hyperlipidemia     Hypertension       Allergies: No Known Allergies     Assessment    1.     Non-small cell cancer, right-sided: Large right-sided pleural effusion with multiple pleural-based masses present suspicion for metastatic disease, cytology from thoracentesis showed adenocarcinoma. CT abdomen and MRI brain negative for metastasis.   2.  Pericardial lymphadenopathy  3. Significant history of smoking  4. Large right-sided pleural effusion: Plan for Pleurx  5. Prostrate cancer 28 years ago status post resection   6. Thyroid cancer 4 years ago status post resection and radiotherapy  Plan   I reviewed the diagnosis, prognosis, treatment options with patient and family  He has stage IV metastatic non-small cell lung cancer with pleural fluid cytology positive for adenocarcinoma  We have sent next-generation sequencing on the pleural fluid cytology  I discussed the option of palliative treatment, goals of care with patient  I reviewed the NCCN guidelines. He is not a candidate for aggressive chemotherapy and he wants to focus on quality of life. Therefore I think single agent Keytruda may be reasonable option regardless of PD-L1 expression  We will plan for chemo teaching  Return to clinic with cycle 1  Patient and family agreeable    Abhay R. Olympia Essex, MD  Hematologist/Medical Oncologist    This note is created with the assistance of a speech recognition program.  While intending to generate a document that actually reflects the content of the visit, the document can still have some errors including those of syntax and sound a like substitutions which may escape proof reading. It such instances, actual meaning can be extrapolated by contextual diversion.

## 2020-10-15 NOTE — TELEPHONE ENCOUNTER
Name: Warren Vale  : 1932  MRN: [id number]    Oncology Navigation Follow-Up Note  Navigator spoke with Pt. And daughter and offering assistance/resources. Daughter may have questions in regards to out of pocket expenses/financial questions? Pt. In indepedent living along with his wife and daughter trying to avoid moving pt. Okay with Daughter to have Neetu/Alba reach out to her number for questions. Writer spoke with Allan Anderson and informed. Plan to follow.    Electronically signed by Tatiana Crain RN on 10/15/2020 at 12:54 PM

## 2020-10-15 NOTE — TELEPHONE ENCOUNTER
Per avs, Plan for immunotherapy in 2 weeks, Chemo teach, RTC c1/avs given to [facility], chemo , to contact pt to schedule. HUMBERTO eval/message sent to [facility] to contact pt.

## 2020-10-16 ENCOUNTER — PREP FOR PROCEDURE (OUTPATIENT)
Dept: GENERAL RADIOLOGY | Age: 85
End: 2020-10-16

## 2020-10-16 ENCOUNTER — SOCIAL WORK (OUTPATIENT)
Dept: ONCOLOGY | Age: 85
End: 2020-10-16

## 2020-10-16 LAB
SARS-COV-2, RAPID: NORMAL
SARS-COV-2: NORMAL
SARS-COV-2: NOT DETECTED
SOURCE: NORMAL

## 2020-10-16 PROCEDURE — U0003 INFECTIOUS AGENT DETECTION BY NUCLEIC ACID (DNA OR RNA); SEVERE ACUTE RESPIRATORY SYNDROME CORONAVIRUS 2 (SARS-COV-2) (CORONAVIRUS DISEASE [COVID-19]), AMPLIFIED PROBE TECHNIQUE, MAKING USE OF HIGH THROUGHPUT TECHNOLOGIES AS DESCRIBED BY CMS-2020-01-R: HCPCS

## 2020-10-16 RX ORDER — SODIUM CHLORIDE 9 MG/ML
INJECTION, SOLUTION INTRAVENOUS CONTINUOUS
Status: CANCELLED | OUTPATIENT
Start: 2020-10-16

## 2020-10-17 ENCOUNTER — TELEPHONE (OUTPATIENT)
Dept: PRIMARY CARE CLINIC | Age: 85
End: 2020-10-17

## 2020-10-19 ENCOUNTER — TELEPHONE (OUTPATIENT)
Dept: CASE MANAGEMENT | Age: 85
End: 2020-10-19

## 2020-10-19 ENCOUNTER — HOSPITAL ENCOUNTER (OUTPATIENT)
Dept: INTERVENTIONAL RADIOLOGY/VASCULAR | Age: 85
Discharge: HOME | End: 2020-10-21
Payer: MEDICARE

## 2020-10-19 VITALS
RESPIRATION RATE: 18 BRPM | HEART RATE: 83 BPM | SYSTOLIC BLOOD PRESSURE: 131 MMHG | DIASTOLIC BLOOD PRESSURE: 71 MMHG | WEIGHT: 180 LBS | OXYGEN SATURATION: 96 % | TEMPERATURE: 97.3 F | HEIGHT: 69 IN | BODY MASS INDEX: 26.66 KG/M2

## 2020-10-19 LAB
INR BLD: 1
PARTIAL THROMBOPLASTIN TIME: 26.7 SEC (ref 20.5–30.5)
PLATELET # BLD: 215 K/UL (ref 138–453)
PROTHROMBIN TIME: 10.5 SEC (ref 9–12)

## 2020-10-19 PROCEDURE — 85610 PROTHROMBIN TIME: CPT

## 2020-10-19 PROCEDURE — 6360000002 HC RX W HCPCS: Performed by: RADIOLOGY

## 2020-10-19 PROCEDURE — 32550 INSERT PLEURAL CATH: CPT

## 2020-10-19 PROCEDURE — 85049 AUTOMATED PLATELET COUNT: CPT

## 2020-10-19 PROCEDURE — 7100000011 HC PHASE II RECOVERY - ADDTL 15 MIN

## 2020-10-19 PROCEDURE — 2580000003 HC RX 258: Performed by: PHYSICIAN ASSISTANT

## 2020-10-19 PROCEDURE — 6360000002 HC RX W HCPCS: Performed by: PHYSICIAN ASSISTANT

## 2020-10-19 PROCEDURE — C1729 CATH, DRAINAGE: HCPCS

## 2020-10-19 PROCEDURE — 75989 ABSCESS DRAINAGE UNDER X-RAY: CPT

## 2020-10-19 PROCEDURE — 2709999900 HC NON-CHARGEABLE SUPPLY

## 2020-10-19 PROCEDURE — 7100000010 HC PHASE II RECOVERY - FIRST 15 MIN

## 2020-10-19 PROCEDURE — 85730 THROMBOPLASTIN TIME PARTIAL: CPT

## 2020-10-19 RX ORDER — SODIUM CHLORIDE 9 MG/ML
INJECTION, SOLUTION INTRAVENOUS CONTINUOUS
Status: DISCONTINUED | OUTPATIENT
Start: 2020-10-19 | End: 2020-10-22 | Stop reason: HOSPADM

## 2020-10-19 RX ORDER — ACETAMINOPHEN 325 MG/1
650 TABLET ORAL EVERY 4 HOURS PRN
Status: DISCONTINUED | OUTPATIENT
Start: 2020-10-19 | End: 2020-10-22 | Stop reason: HOSPADM

## 2020-10-19 RX ORDER — LEVOTHYROXINE SODIUM 112 UG/1
TABLET ORAL
Qty: 90 TABLET | Refills: 2 | Status: SHIPPED | OUTPATIENT
Start: 2020-10-19 | End: 2021-01-01 | Stop reason: SDUPTHER

## 2020-10-19 RX ORDER — SIMVASTATIN 40 MG
TABLET ORAL
Qty: 90 TABLET | Refills: 2 | Status: SHIPPED | OUTPATIENT
Start: 2020-10-19 | End: 2021-01-01 | Stop reason: ALTCHOICE

## 2020-10-19 RX ORDER — MIDAZOLAM HYDROCHLORIDE 1 MG/ML
INJECTION INTRAMUSCULAR; INTRAVENOUS
Status: COMPLETED | OUTPATIENT
Start: 2020-10-19 | End: 2020-10-19

## 2020-10-19 RX ORDER — FENTANYL CITRATE 50 UG/ML
INJECTION, SOLUTION INTRAMUSCULAR; INTRAVENOUS
Status: COMPLETED | OUTPATIENT
Start: 2020-10-19 | End: 2020-10-19

## 2020-10-19 RX ORDER — CEFAZOLIN SODIUM 1 G/50ML
1 INJECTION, SOLUTION INTRAVENOUS
Status: COMPLETED | OUTPATIENT
Start: 2020-10-19 | End: 2020-10-19

## 2020-10-19 RX ADMIN — FENTANYL CITRATE 25 MCG: 50 INJECTION INTRAMUSCULAR; INTRAVENOUS at 10:42

## 2020-10-19 RX ADMIN — SODIUM CHLORIDE: 9 INJECTION, SOLUTION INTRAVENOUS at 09:07

## 2020-10-19 RX ADMIN — CEFAZOLIN SODIUM 1 G: 1 INJECTION, SOLUTION INTRAVENOUS at 10:50

## 2020-10-19 RX ADMIN — MIDAZOLAM HYDROCHLORIDE 0.75 MG: 1 INJECTION, SOLUTION INTRAMUSCULAR; INTRAVENOUS at 10:38

## 2020-10-19 RX ADMIN — FENTANYL CITRATE 25 MCG: 50 INJECTION INTRAMUSCULAR; INTRAVENOUS at 10:38

## 2020-10-19 ASSESSMENT — PAIN SCALES - GENERAL
PAINLEVEL_OUTOF10: 0
PAINLEVEL_OUTOF10: 0

## 2020-10-19 ASSESSMENT — PAIN - FUNCTIONAL ASSESSMENT: PAIN_FUNCTIONAL_ASSESSMENT: 0-10

## 2020-10-19 NOTE — PROGRESS NOTES
Severe SOB with activity. Wears home O2, currently at 3.5 L/min as family instructed. PsO2 %. Unable to wear mask over nose due to it causing increased SOB. Slightly [place], sometimes requesting repeat question. Daughter answers many questions stating patient is Too SOB to talk. Informed of procedure process. Questions asked & answered as best able. More answers to follow with IR doctor.   Electronically signed by Inocencio White RN on 10/19/20 at 9:45 AM EDT

## 2020-10-19 NOTE — H&P
History and Physical    Pt Name: Shan Raymond  MRN: [id number]  YOB: 1932  Date of evaluation: 10/19/2020  Primary Care Physician: LO Graf CNP    SUBJECTIVE:   History of Chief Complaint:    Shan Raymond is a 80 y.o. male who is scheduled today for Aspira drain placement-right . He was recently diagnosed with lung cancer. Pt is present with his daughter, who is very helpful with history. She reports the patient has recurring pleural effusion and the patient is increasingly short of breath. Allergies  has No Known Allergies. Medications  Prior to Admission medications    Medication Sig Start Date End Date Taking? Authorizing Provider   metoprolol tartrate (LOPRESSOR) 25 MG tablet TAKE 1 TABLET BY MOUTH TWICE DAILY 10/12/20  Yes DINESH Rodriguez   loratadine (CLARITIN) 10 MG tablet TAKE 1 TABLET BY MOUTH DAILY 10/12/20  Yes DINESH Rodriguez   meloxicam (MOBIC) 15 MG tablet TAKE 1 TABLET BY MOUTH DAILY 10/12/20  Yes DINESH Rodriguez   simvastatin (ZOCOR) 40 MG tablet TAKE 1 TABLET BY MOUTH EVERY NIGHT 1/20/20  Yes LO Graf CNP   levothyroxine (SYNTHROID) 112 MCG tablet TAKE 1 TABLET BY MOUTH EVERY DAY 1/20/20  Yes LO Graf CNP   Glucos-Chond-Hyal Ac-Ca Fructo (MOVE FREE JOINT HEALTH ADVANCE PO) Take 1 tablet by mouth daily    Historical Provider, MD     Past Medical History    has a past medical history of Arthritis, Hearing loss, Hyperlipidemia, Hypertension, Lung cancer ([address] Malignant pleural effusion, Prostate cancer ([address] Thyroid cancer ([address] and Thyroid disease. Past Surgical History   has a past surgical history that includes Prostatectomy; Thyroidectomy; Carpal tunnel release (Bilateral); and Mouth surgery. Social History   reports that he quit smoking about 51 years ago. His smoking use included cigarettes. He has a 20.00 pack-year smoking history.  He has never used smokeless tobacco.   reports current alcohol use of about 16.0 standard drinks of alcohol per week. reports no history of drug use. Marital Status    Children 4 biological, 5 step   Occupation retired , also used to be a jackson in a [employer] Status   Relation Name Status    Father  (Not Specified)    Brother  (Not Specified)     family history includes Diabetes in his brother; Prostate Cancer in his father. OBJECTIVE:   VITALS:  height is 5' 9\" (1.753 m) and weight is 180 lb (81.6 kg). His tympanic temperature is 97.7 °F (36.5 °C). His blood pressure is 155/76 (abnormal) and his pulse is 82. His respiration is 22 and oxygen saturation is 99%. CONSTITUTIONAL:Alert and orientated to person, place and time. No acute distress. Friendly. Very pleasant. SKIN:  Warm & dry, no rashes on exposed skin, seborrheic skin changes. HEENT: HEAD: Normocephalic, atraumatic        EYES:  PERRL, EOMs intact, conjunctiva clear      EARS:  Equal bilaterally, no edema or thickening, skin is intact without lumps or lesions. No discharge. Hearing loss. NOSE:  Nares patent, septum midline, no rhinorrhea, nasal cannula in use     MOUTH/THROAT:  Mucous membranes moist, tongue is pink, uvula midline, teeth appear to be intact  NECK:  Supple, no lymphadenopathy, full ROM  LUNGS: Respirations even and non-labored. Overall diminished R>L. Dyspnea with minimal exertion noted. CARDIOVASCULAR: regular rate and rhythm, no murmurs/rubs/gallops, intermittent slight irregularity, rate regular  ABDOMEN: soft, non-tender, non-distended, bowel sounds active x 4   MUSCULOSKELETAL: Full ROM bilateral upper extremities, Full ROM bilateral lower extremities. VASCULAR:  Brisk cap refill bilateral fingers. Radial pulses are intact, 2+ bilaterally. Dorsalis pedis pulse 2+ bilaterally. No edema or varicosities bilateral lower extremities  NEUROLOGIC: CN II-XII are grossly intact. Gait not assessed.      IMPRESSIONS:   Malignant pleural effusion       Diagnosis Date    Arthritis     Hearing loss     Hyperlipidemia     Hypertension     Lung cancer [address] 10/2020    stage 4, cells in pleural space.  Malignant pleural effusion     RIGHT    Prostate cancer ([facility] 1992 approx    treated surgically    Thyroid cancer ([facility] 2017    treated surgically and with radiation    Thyroid disease    1.    PLANS:   Aspira drain placement     CHERYL VALERIO  Electronically signed 10/19/2020 at 9:21 AM

## 2020-10-19 NOTE — TELEPHONE ENCOUNTER
Name: Gigi Shirley  : 1932  MRN: [id number]    Oncology Navigation Follow-Up Note  Navigator reviewing chart and [facility] remains pending at this time.  Writer checking for  and still processing, plan to follow  Electronically signed by Oliva Ivey RN on 10/19/2020 at 2:53 PM

## 2020-10-19 NOTE — PROGRESS NOTES
Discharge instructions reviewed. Daughter informed, tells patient not to be concerned that she & family will help him.   IV removed without prolonged bleeding

## 2020-10-20 ENCOUNTER — TELEPHONE (OUTPATIENT)
Dept: ONCOLOGY | Age: 85
End: 2020-10-20

## 2020-10-20 NOTE — TELEPHONE ENCOUNTER
received referral from Medical Oncology.  called patient's daughter, Luzma Castillo. Luzma Castillo reports patient and spouse living in independent living and would prefer to stay with independent living. Daughter states patient feeling much better after getter fluid drained yesterday. Luzma Castillo had questions about Advance Directives and prescriptions.  went over AD and discussed ways to organize prescriptions. Luzma Castillo has been in contact with [place] on possible services if needed and has looked into other providers for home care services.  went over [facility] and made referral to Patient Assistance for possible assistance for infusions.  will continue to follow.

## 2020-10-22 ENCOUNTER — TELEPHONE (OUTPATIENT)
Dept: CASE MANAGEMENT | Age: 85
End: 2020-10-22

## 2020-10-22 NOTE — TELEPHONE ENCOUNTER
Name: Izzy Gaytan  : 1932  MRN: [id number]    Oncology Navigation Follow-Up Note  Navigator reviewing chart and PDL1 resulted and scanned to chart @ 2%, checking status of Precert for Keytruda with Madeline.                                            Electronically signed by Shola Ramirez RN on 10/22/2020 at 9:34 AM

## 2020-10-27 ENCOUNTER — TELEPHONE (OUTPATIENT)
Dept: CASE MANAGEMENT | Age: 85
End: 2020-10-27

## 2020-10-27 NOTE — TELEPHONE ENCOUNTER
[place]ston Cabrera, , questioned if patient had peer to peer done. Writer reviewed chart and does not see any documentation regarding need/completion of peer to peer for Darby Ashing with Dr Amy Elkins. Writer faxed PD-L1 and Tempus marker results to Meggan Swanson in pre-cert. Meggan Swanson also informed of positive PD-L1 and results showing Darby Ashing is recommended tx. Meggan Swanson stated that Prateek in pre-cert has been working on this case. Writer emailed and sent a skype message to [address] [facility] of above.  Robert Gordillo

## 2020-10-27 NOTE — TELEPHONE ENCOUNTER
Late Entry 10/26 checking with [facility] in regards to status of Keytruda? In review of referral, closed post P2P, Message sent to [facility]. [facility] responding \"will check tomorrow\". Navigator attempting to touch base with Margareth Sexton. Twice today, but out of office. 3:06 confirmed with Nataly Silva has reached out to precert.

## 2020-10-28 ENCOUNTER — TELEPHONE (OUTPATIENT)
Dept: CASE MANAGEMENT | Age: 85
End: 2020-10-28

## 2020-10-28 NOTE — TELEPHONE ENCOUNTER
Name: Joaquina De La Rosa  : 1932  MRN: [id number]    Oncology Navigation Follow-Up Note  Navigator returning earlier voicemail left per daughter and informed daughter [place] resubmitted with final results of EGFR and ALK and await approval.   Electronically signed by Padmini Swanson RN on 10/28/2020 at 2:22 PM

## 2020-10-28 NOTE — TELEPHONE ENCOUNTER
Name: Analisa Rose  : 1932  MRN: [id number]    Oncology Navigation Follow-Up Note  Navigator reviewing chart and appreciate triage notes, plan to follow and will update pt.    Electronically signed by Mp Riojas RN on 10/28/2020 at 9:24 AM

## 2020-10-28 NOTE — TELEPHONE ENCOUNTER
Writer spoke with Dr Johnson Cabrera questioning if he performed a peer to peer. Dr Johnson Cabrera indicated that a peer to peer was performed and stated Arletta Bridge was denied. Dr Johnson Cabrera stated physician reviewer indicated if patient's EGFR and ALK came back negative that Arletta Bridge would be approved. Writer informed Dr Johnson Cabrera that Tempus results came back and EGFR and ALK were negative. Writer also informed Alonzo Enriquez in pre-cert of above results. Writer faxed results to ATTN: Alonzo Enriquez at [phone]. Confirmation fax received. Writer also [employer] and she stated she will forward information to [employer].  Galina Vazquez

## 2020-10-30 ENCOUNTER — TELEPHONE (OUTPATIENT)
Dept: ONCOLOGY | Age: 85
End: 2020-10-30

## 2020-10-30 NOTE — TELEPHONE ENCOUNTER
Call received from Petra Alarcon, pre-cert, stating patient's Portillo Mondragon is approved. Writer informed Jennifer Miramontes, , and Rustam, navigator, of above. Rosemarie to call patient to schedule teach/treatment and f/u appt with Dr Melissa Das.  Marsha Pack

## 2020-10-30 NOTE — TELEPHONE ENCOUNTER
Writer sent email to Marilin Tran, Dr Israel Oconnor and Sammy Rodriguez (pre-cert) regarding status update for [facility].  Judi Saucedo

## 2020-11-03 ENCOUNTER — TELEPHONE (OUTPATIENT)
Dept: INFUSION THERAPY | Age: 85
End: 2020-11-03

## 2020-11-03 NOTE — TELEPHONE ENCOUNTER
Chemo orders received, ht 69\", wt 180 lbs, and bsa 1.99 verified. Dose of chemotherapy verified:  Keytruda 200mg flat dose.   Jonny Haji

## 2020-11-04 ENCOUNTER — OFFICE VISIT (OUTPATIENT)
Dept: ONCOLOGY | Age: 85
End: 2020-11-04
Payer: MEDICARE

## 2020-11-04 PROCEDURE — 99999 PR OFFICE/OUTPT VISIT,PROCEDURE ONLY: CPT | Performed by: INTERNAL MEDICINE

## 2020-11-04 NOTE — PROGRESS NOTES
River and is daughter (NICU RN at [facility]) here for chemotherapy teaching. Spent 60 minutes with them. Teaching sheets from [facility] and verbal information given on chemotherapy agents, action, administration and side effects. Chemotherapy medications discussed: [place]     Chemotherapy consent form signed by patient. Provided new patient binder,     Patient to have treatment peripherally      Questions answered and support given.     [facility] rehab referral assessment form, distress tool form and PG-SGA form completed by patient. Patient declined      Needs that were identified during teaching visit: no needs identified at this time, pt lives in independent living at [place] [address] resources such as [address], [employer], [employer], [email or web address], etc.     Pt will return on 11/13 for C1D1 and f/u with Dr. Leonela Parker  on 11/13.     Kathy Albright RN

## 2020-11-13 NOTE — PROGRESS NOTES
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 Pleural fluid cytology positive for adenocarcinoma  Multiple masses in the right hemithorax which are pleural based and along the pericardium. ECOG performance status 2  TREATMENT HISTORY:  Next-generation testing with TEMPUS is ordered  Patient planning to have Pleurx catheter placement on 10/19  Patient not a candidate for aggressive chemotherapy therefore we will plan single agent Keytruda, regardless of PD-L1 expression results  His insurance delayed the treatment approval  Today he will be started on single agent Keytruda  INTERVAL HISTORY[de-identified]   Patient is returning for follow-up visit and to discuss further recommendations. He had MRI of the brain which was negative for metastasis. He still has shortness of breath and using oxygen 3 L at home. He has recurrent right-sided pleural effusion and is following with pulmonologist Dr. Dorene Rivera and planning to have Pleurx catheter placement on Monday. He denies any pain. No nausea vomiting. During this visit patient's allergy, social, medical, surgical history and medications were reviewed and updated. HPI in Brief:   The patient is a 80 y.o.   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     [facility] oncology was consulted for suspicion of metastatic disease. Unknown primary.     Patient also reported poor appetite and intermittent episodes of dry cough since 1 month. No significant weight loss reported. Significant risk factors include 13-pack-year history of smoking quit in 1969  Daily drinker drinking 2-3 drinks every day. Denied exposure to chemicals like asbestos/silica  Evaluated by pulmonology. Had IR guided thoracentesis. Surg path report showed adenocarcinoma of lung. Review of systems  General: no fever or night sweats, Weight is stable. ENT: No double or blurred vision, no tinnitus or hearing problem, no dysphagia or sore throat   Respiratory: No chest pain, no shortness of breath, no cough or hemoptysis. Cardiovascular: Denies chest pain, PND or orthopnea. No L E swelling or palpitations. Gastrointestinal:    No nausea or vomiting, abdominal pain, diarrhea or constipation. Genitourinary: Denies dysuria, hematuria, frequency, urgency or incontinence. Neurological: Denies headaches, decreased LOC, no sensory or motor focal deficits. Medications:   Reviewed in Epic     Objective:   Vitals: /63   Pulse 76   Temp 97.7 °F (36.5 °C) (Oral)   Wt 176 lb 8 oz (80.1 kg)   BMI 26.06 kg/m²     General appearance - well appearing, on 2 L nasal cannula. Mental status - alert and cooperative   Mouth - mucous membranes moist  Neck -Nno significant adenopathy, s/p resection of thyroid  Lymphatics - no palpable lymphadenopathy. Chest -bilaterally diminished breath sounds at bases. Bilateral expiratory wheezes noticed.   Heart - normal rate, regular rhythm, normal S1, S2, no murmurs  Abdomen - soft, nontender, nondistended, no masses or organomegaly   Neurological - alert, oriented, normal speech, no focal findings   Musculoskeletal - no joint tenderness, deformity or swelling   Extremities - peripheral pulses normal, no pedal edema, no clubbing or cyanosis   Skin - normal coloration and turgor, no rashes, no suspicious in pleural space.  Malignant pleural effusion     RIGHT    On supplemental oxygen therapy     Prostate cancer ([facility] [address] 1992 approx    treated surgically    Thyroid cancer ([facility] [address] 2017    treated surgically and with radiation    Thyroid disease       Allergies: No Known Allergies     Assessment    1. Non-small cell cancer, right-sided: Large right-sided pleural effusion with multiple pleural-based masses present suspicion for metastatic disease, cytology from thoracentesis showed adenocarcinoma. CT abdomen and MRI brain negative for metastasis.   2.  Pericardial lymphadenopathy  3. Significant history of smoking  4. Large right-sided pleural effusion: Plan for Pleurx  5. Prostrate cancer 28 years ago status post resection   6. 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 He is not a candidate for aggressive chemotherapy and he wants to focus on quality of life. Proceed with single agent Keytruda today after labs  Return to clinic with cycle 2  Patient and family agreeable    Kev Rosenberg MD  Hematologist/Medical Oncologist    This note is created with the assistance of a speech recognition program.  While intending to generate a document that actually reflects the content of the visit, the document can still have some errors including those of syntax and sound a like substitutions which may escape proof reading. It such instances, actual meaning can be extrapolated by contextual diversion.

## 2020-11-13 NOTE — TELEPHONE ENCOUNTER
Pt was seen today by Dr. Jim Irving. Per Tracey cuevas as planned  RTC c2  Pt is scheduled for f/u on 12-08-20 with tx to follow.

## 2020-11-13 NOTE — TELEPHONE ENCOUNTER
Name: Marija Hawthorne  : 1932  MRN: [id number]    Oncology Navigation Follow-Up Note  Navigator met with pt. And daughter along with Dr. Ekta Greenwood today. Navigation folder given to daughter along with business card. Pt. To receive Keytruda today following labs. Ensure samples given to daughter and will reach out to [facility]. For assistance if needed. Pleurex catheter drain discussed with pt. And daughter, (approx 800-900ml drainage) . MD informing daughter hope to see drainage decrease once Keytruda started, but not sure of time frame? Navigator reviewing with daughter Rukhsana Gonzalez to call When\", plan to follow.    Electronically signed by Ulysses Murry RN on 2020 at 11:04 AM

## 2020-11-16 NOTE — TELEPHONE ENCOUNTER
NUTRITION NOTE    Called pt on listed home phone number r/t RN referral. No answer. Will reattempt.     AYDIN Keller, RD, LD  Registered Dietitian  [facility]  [phone]

## 2020-11-17 NOTE — TELEPHONE ENCOUNTER
We received a call asking if we would sign the order for his pleural drain supplies for a company that is in network, this company is out of network. (I didn't get the name or company name of who called). I told her that supplies given to the patient is dependent on the drain that is placed. We cant sign the order, we didn't place the drain, we dont know any specifics on it. If there was a problem, we wouldn't be able to fix it. I asked her to call Interventional Radiology, she did and spoke to Carl Eldridge who told her they dont take care of the orders for the supplies, even though her physicians placed the drain, and the hospital chose the supplier. She is going to look into it a bit further and call us if she has any questions.

## 2020-11-24 NOTE — TELEPHONE ENCOUNTER
Name: Gamal Prieto  : 1932  MRN: [id number]    Oncology Navigation Follow-Up Note  Navigator spoke with pt. And denies any needs at this time. Plan to follow.    Electronically signed by Negra Spence RN on 2020 at 3:52 PM

## 2020-12-01 NOTE — TELEPHONE ENCOUNTER
Ashly Initial Nutrition Assessment    Marija Hawthorne is a 80 y.o.  male     Reason for Evaluation: RN referral    Subjective/Current Data:  Called pt on listed home phone number. Pt states no appetite, no smell or taste. Pt states he still consumes multiple meals per day. Pt admits he doesn't \"eat a lot\" and his fatigue and weakness also effect his PO intakes. Pt reports unplanned wt loss approx 25# x 1 month. Pt states 2 months ago his normal weight was 200#. Pt states he drink [employer] Nutrition Shake, which has 13g protein, 350 kcal which he consumes as breakfast. RD encouraged small, frequent meals, consuming food along with nutrition shake, and increasing nutrition shake consumption to BID. Pt receptive to information and appreciative of phone call. Past Medical History:  Past Medical History:   Diagnosis Date    Arthritis     Hearing loss     Hyperlipidemia     Hypertension     Lung cancer ([facility] [address] 10/2020    stage 4, cells in pleural space.     Malignant pleural effusion     RIGHT    On supplemental oxygen therapy     Prostate cancer ([facility] [address] 1992 approx    treated surgically    Thyroid cancer ([facility] [address] 2017    treated surgically and with radiation    Thyroid disease      Past Surgical History:   Procedure Laterality Date    CARPAL TUNNEL RELEASE Bilateral     IR INSERT TUNNELED PLEURA CATH W CUFF  10/19/2020    IR INSERT TUNNELED PLEURA CATH W CUFF 10/19/2020 Chu Anne MD [facility]    MOUTH SURGERY      dental repairs    PROSTATECTOMY      THYROIDECTOMY          Anthropometric Measures:  Current Weight: 80.1kg  Ideal Body Weight:  73kg  Ideal Body Weight %: 110%    Nutritional Requirements:  Estimated Energy Needs:  Weight Used: 80.1kg   Method Used: Génesis Ramos  Estimated kcal Needs: 7573-9573 kcal per day  Protein Needs:  Weight used: 80.1 kg  1.2-1.4 g/kg =  g per day    Nutrition-focused Physical Findings   GI symptoms: poor appetite  Skin: Intact    Nutrition Diagnosis and Goal  Problem:  Unintentional wt loss  Etiology/related to: catabolic illness  Symptoms/Signs/as evidenced by: unintentional wt loss 25# (12.5%) x 2 months, poor PO intakes, lung ca       Goal: Adequate intake to aide in wt maintenaince, signs and symptoms management  Progress towards goal: unchanged  Education Needs: verbal high calorie/high protein diet education provided  Malnutrition Assessment  · Per AND/ASPEN guidelines, patient meets the criteria for moderate malnutrition in the context of chronic illness based on:   · 12.5 % weight loss in 2 months  · Predicted PO intakes meeting <75% estimated needs x3 months. · Physical assessment unable to be completed d/t global pandemic and remote pt care. Per AND/ASPEN guidelines, will monitor for additional RD, RN and MD documentation re weight status, nutritional intake, fluid accumulation, possible loss of body fat or muscle mass, or possible reduced  strength. NUTRITION RECOMMENDATIONS / MONITORING / EVALUATION  1. Recommend continue small, frequent meals  2. Oral nutrition supplement BID  3. Will monitor wts, labs, PO intakes, s/s management.       AYDIN Garcia, RD, LD  Registered Dietitian  [facility]  [phone]

## 2020-12-04 NOTE — TELEPHONE ENCOUNTER
Daughter called today, she went to his house to drain his PE drain and change the dressing. She reports some dark redness in the sub Q track with a scan amount of yellow drainage. The incision area looks clean though. No fever or anything other than the redness which is new from 4 days prior. Patient is due for his 2nd [facility] treatment on the 8th with labs. She wonders if its infection, if because of weakened immune system his body my be rejecting the drain? What do you advise they do?

## 2020-12-07 NOTE — TELEPHONE ENCOUNTER
Johnny Mejia MD  You 10 minutes ago (2:59 PM)       Will recommend to watch for fever or pain at the site of catheter or worsening of redness.  No antibiotic recommended at this point.     Message text

## 2020-12-08 NOTE — PROGRESS NOTES
reported. Significant risk factors include 13-pack-year history of smoking quit in 1969  Daily drinker drinking 2-3 drinks every day. Denied exposure to chemicals like asbestos/silica  Evaluated by pulmonology. Had IR guided thoracentesis. Surg path report showed adenocarcinoma of lung. Review of systems  General: no fever or night sweats, Weight is stable. ENT: No double or blurred vision, no tinnitus or hearing problem, no dysphagia or sore throat   Respiratory: No chest pain, no shortness of breath, no cough or hemoptysis. Cardiovascular: Denies chest pain, PND or orthopnea. No L E swelling or palpitations. Gastrointestinal:    No nausea or vomiting, abdominal pain, diarrhea or constipation. Genitourinary: Denies dysuria, hematuria, frequency, urgency or incontinence. Neurological: Denies headaches, decreased LOC, no sensory or motor focal deficits. No Known Allergies    Medications:   Reviewed in Epic     Objective:   Vitals: BP (!) 105/50   Pulse 76   Temp 97.5 °F (36.4 °C) (Oral)   Wt 169 lb 12.8 oz (77 kg)   BMI 25.08 kg/m²   General appearance - well appearing, on 2 L nasal cannula. Mental status - alert and cooperative   Mouth - mucous membranes moist  Neck -Nno significant adenopathy, s/p resection of thyroid  Lymphatics - no palpable lymphadenopathy. Chest -bilaterally diminished breath sounds at bases. Bilateral expiratory wheezes noticed.   Heart - normal rate, regular rhythm, normal S1, S2, no murmurs  Abdomen - soft, nontender, nondistended, no masses or organomegaly   Neurological - alert, oriented, normal speech, no focal findings   Musculoskeletal - no joint tenderness, deformity or swelling   Extremities - peripheral pulses normal, no pedal edema, no clubbing or cyanosis   Skin - normal coloration and turgor, no rashes, no suspicious skin lesions noted ,     Data:  Lab Results   Component Value Date    WBC 5.8 12/08/2020    HGB 12.3 (L) 12/08/2020    HCT 37.4 (L) 12/08/2020    MCV 91.7 12/08/2020     12/08/2020     Lab Results   Component Value Date     11/13/2020    K 4.5 11/13/2020     11/13/2020    CO2 27 11/13/2020    BUN 16 11/13/2020    CREATININE 0.77 11/13/2020    GLUCOSE 154 (H) 11/13/2020    CALCIUM 9.5 11/13/2020    PROT 6.0 (L) 11/13/2020    LABALBU 3.6 11/13/2020    BILITOT 0.37 11/13/2020    ALKPHOS 61 11/13/2020    AST 18 11/13/2020    ALT 7 11/13/2020    LABGLOM >60 11/13/2020    GFRAA >60 11/13/2020    GLOB NOT REPORTED 04/26/2019       Surgical Pathology Report   Surgical Pathology   Collected:  10/01/20 1600    Lab status:  Final    Resulting lab:  [facility]    Value:  -- Diagnosis --   RIGHT THORACENTESIS FLUID:          METASTATIC ADENOCARCINOMA, COMPATIBLE WITH LUNG PRIMARY. IMAGING DATA:  CT chest PE   No central or segmental pulmonary embolus.         Large right pleural effusion.         Multiple masses within the right hemithorax which appear to be pleural based    and along the pericardium.  Metastatic disease is suspected      Chest x-ray 9/29/2020  Interval increase in now moderate right basilar opacity with opacification of    the inferior 1/2 of the right hemithorax, findings likely related to    increasing moderately large right pleural effusion with underlying    atelectasis/infiltrate and or mass       CT abd pelvis- 10/2/20  Impression    Scattered right pleural soft tissue nodules and mediastinal lymphadenopathy    reflecting malignancy.         Otherwise, no evidence for metastatic disease in the abdomen or pelvis. MR brain  Impression    Unremarkable MRI of brain without abnormal postcontrast enhancement. PMH:  Past Medical History:   Diagnosis Date    Arthritis     Hearing loss     Hyperlipidemia     Hypertension     Lung cancer ([address] 10/2020    stage 4, cells in pleural space.     Malignant pleural effusion     RIGHT    On supplemental oxygen therapy     Prostate cancer ([address] 1992 approx    treated surgically    Thyroid cancer ([facility] [address] 2017    treated surgically and with radiation    Thyroid disease       Allergies: No Known Allergies     Assessment    1. Non-small cell cancer, right-sided: Large right-sided pleural effusion with multiple pleural-based masses present suspicion for metastatic disease, cytology from thoracentesis showed adenocarcinoma. CT abdomen and MRI brain negative for metastasis.   2.  Pericardial lymphadenopathy  3. Significant history of smoking  4. Large right-sided pleural effusion: Plan for Pleurx  5. Prostrate cancer 28 years ago status post resection   6. 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 He is not a candidate for aggressive chemotherapy and he wants to focus on quality of life. Proceed with single agent Keytruda today after labs  Return to clinic with cycle 4. I will plan to get restaging scans prior to cycle 5  Patient and family agreeable    Kev Pritchett MD  Hematologist/Medical Oncologist    This note is created with the assistance of a speech recognition program.  While intending to generate a document that actually reflects the content of the visit, the document can still have some errors including those of syntax and sound a like substitutions which may escape proof reading. It such instances, actual meaning can be extrapolated by contextual diversion.

## 2020-12-08 NOTE — TELEPHONE ENCOUNTER
Pt's daughter calling and states that her father was dx's with lung cancer around October and has been going to the [facility] for immuno therapy. She states that his Bps have been low today it was 105/50 and he c/o feeling weak. She states that he takes metoprolol tartrate (LOPRESSOR) 25 MG tablet BID and is asking if this should be lowered. Please advise.

## 2020-12-08 NOTE — PROGRESS NOTES
Pt here for Rosa Cameron. Pt seen by Dr Jim Irving prior to treatment, refer to his note. Labs drawn and results reviewed. Pt was treated without incident and d/c'd in stable condition. Pt will return on 12-29-20 for C3D1.

## 2020-12-08 NOTE — TELEPHONE ENCOUNTER
Decrease to 12.5 mg BID. May cut tablet in half. If possible I would like to see Mala Merchant and Idalia Shrestha next week if possible. Please schedule.

## 2020-12-14 NOTE — PROGRESS NOTES
[facility] PRIMARY CARE  [address]  [address] [address]  Dept: [phone]    Sven Syed is a 80 y.o. male who presents today for his medical conditions/complaintsas noted below. Chief Complaint   Patient presents with    Other     Pt here for a BP check. Pts daughter states his BP has been low. - might be his cancer treatment dropping his BP.  Depression     Pt states he is feeling depressed. Pt states he feels weak and down       HPI:     HPI  He is see Dr. Fallon Patient oncology, he is getting [place] every 3 weeks and reevaluate every 4th treatment - 12/8, 12/29 and 1/19  He has pleural drain which daughter is draining 400 cc every 4 days. He is on Keflex    Blood pressure has been borderline low - He is currently taking metoprolol tartrate 12.5 mg 2x/day    He has not appetite  He is having difficulty having bowel movement. However the bowel movement is not hard like constipation. He is not hungry, he has no appetitie,  He has no smell. The days are long. No thoughts of hurting self or others. LDL Cholesterol (mg/dL)   Date Value   04/26/2019 58       (goal LDL is <100)   AST (U/L)   Date Value   12/08/2020 14     ALT (U/L)   Date Value   12/08/2020 7     BUN (mg/dL)   Date Value   12/08/2020 14     BP Readings from Last 3 Encounters:   12/14/20 (!) 112/54   12/08/20 (!) 105/50   11/13/20 110/63          (goal 120/80)    Past Medical History:   Diagnosis Date    Arthritis     Hearing loss     Hyperlipidemia     Hypertension     Lung cancer ([address] 10/2020    stage 4, cells in pleural space.     Malignant pleural effusion     RIGHT    On supplemental oxygen therapy     Prostate cancer ([address] 1992 approx    treated surgically    Thyroid cancer ([address] 2017    treated surgically and with radiation    Thyroid disease       Past Surgical History:   Procedure Laterality Date    CARPAL TUNNEL RELEASE Bilateral     IR INSERT TUNNELED PLEURA CATH W CUFF  10/19/2020    IR INSERT TUNNELED PLEURA CATH W CUFF 10/19/2020 Reji Albright MD [facility]    MOUTH SURGERY      dental repairs    PROSTATECTOMY      THYROIDECTOMY         Family History   Problem Relation Age of Onset    Prostate Cancer Father     Diabetes Brother        Social History     Tobacco Use    Smoking status: Former Smoker     Packs/day: 1.00     Years: 20.00     Pack years: 20.00     Types: Cigarettes     Quit date:      Years since quittin.9    Smokeless tobacco: Never Used   Substance Use Topics    Alcohol use: Yes     Alcohol/week: 16.0 standard drinks     Types: 2 Glasses of wine, 14 Shots of liquor per week      Current Outpatient Medications   Medication Sig Dispense Refill    cephALEXin (KEFLEX) 500 MG capsule Take 1 capsule by mouth 2 times daily for 7 days 14 capsule 0    polyethylene glycol (GLYCOLAX) 17 GM/SCOOP powder Take 17 g by mouth daily 1530 g 1    levothyroxine (SYNTHROID) 112 MCG tablet TAKE 1 TABLET BY MOUTH EVERY DAY 90 tablet 2    simvastatin (ZOCOR) 40 MG tablet TAKE 1 TABLET BY MOUTH EVERY NIGHT 90 tablet 2    Glucos-Chond-Hyal Ac-Ca Fructo (MOVE FREE JOINT HEALTH ADVANCE PO) Take 1 tablet by mouth daily      loratadine (CLARITIN) 10 MG tablet TAKE 1 TABLET BY MOUTH DAILY 30 tablet 2    meloxicam (MOBIC) 15 MG tablet TAKE 1 TABLET BY MOUTH DAILY 30 tablet 3    furosemide (LASIX) 20 MG tablet TK 1 T PO D       No current facility-administered medications for this visit.       No Known Allergies    Health Maintenance   Topic Date Due    DTaP/Tdap/Td vaccine (1 - Tdap) 1951    Shingles Vaccine (2 of 2) 2019    Annual Wellness Visit (AWV)  2019    Lipid screen  2020    Flu vaccine (1) 2020    PSA counseling  10/01/2021    TSH testing  2021    Potassium monitoring  2021    Creatinine monitoring  2021    Pneumococcal 65+ years Vaccine  Completed    Hepatitis A vaccine  Aged Out  Hepatitis B vaccine  Aged Out    Hib vaccine  Aged Out    Meningococcal (ACWY) vaccine  Aged Out       Subjective:      Review of Systems   Constitutional: Positive for fatigue. Negative for chills and fever. HENT: Negative for congestion, nosebleeds and sinus pain. Eyes: Positive for visual disturbance. Negative for pain and redness. Respiratory: Positive for cough and shortness of breath. Negative for chest tightness. Cardiovascular: Negative for chest pain, palpitations and leg swelling. Gastrointestinal: Positive for abdominal pain. Genitourinary: Negative for difficulty urinating and dysuria. Musculoskeletal: Positive for arthralgias and gait problem. Neurological: Positive for dizziness and headaches. Negative for light-headedness. Psychiatric/Behavioral: Positive for dysphoric mood and sleep disturbance. The patient is nervous/anxious. Objective:     BP (!) 112/54   Pulse 84   Temp 97 °F (36.1 °C)   Wt 168 lb 6.4 oz (76.4 kg)   SpO2 91%   BMI 24.87 kg/m²   Physical Exam  Vitals signs and nursing note reviewed. Constitutional:       General: He is not in acute distress. Appearance: He is well-developed. He is not ill-appearing. HENT:      Head: Normocephalic and atraumatic. Right Ear: External ear normal.      Left Ear: External ear normal.   Eyes:      General: No scleral icterus. Right eye: No discharge. Left eye: No discharge. Conjunctiva/sclera: Conjunctivae normal.      Pupils: Pupils are equal, round, and reactive to light. Neck:      Musculoskeletal: Neck rigidity and crepitus present. Thyroid: No thyromegaly. Trachea: No tracheal deviation. Cardiovascular:      Rate and Rhythm: Normal rate and regular rhythm. Heart sounds: Normal heart sounds. Pulmonary:      Effort: Pulmonary effort is normal. No respiratory distress. Breath sounds: Examination of the right-upper field reveals decreased breath sounds. Examination of the right-middle field reveals decreased breath sounds. Examination of the right-lower field reveals decreased breath sounds. Decreased breath sounds present. No wheezing. Abdominal:      General: Bowel sounds are normal.      Palpations: Abdomen is soft. Lymphadenopathy:      Cervical: No cervical adenopathy. Right cervical: No superficial cervical adenopathy. Left cervical: No superficial cervical adenopathy. Skin:     General: Skin is warm. Findings: No rash. Comments: Pleural drain tubing is erythematous    Neurological:      Mental Status: He is alert and oriented to person, place, and time. Psychiatric:         Mood and Affect: Mood normal.         Behavior: Behavior normal.         Thought Content: Thought content normal.         Assessment:       Diagnosis Orders   1. Skin infection  cephALEXin (KEFLEX) 500 MG capsule   2. Abnormal bowel movement  polyethylene glycol (GLYCOLAX) 17 GM/SCOOP powder   3. Weight loss     4. Non-small cell carcinoma of right lung, stage 4 ([facility]     5. Depression, unspecified depression type          Plan:    Stop metoprolol  Start miralax daily  Try nutritional supplement  Continue Keflex for another week. Return in about 6 weeks (around 1/25/2021) for med check. No orders of the defined types were placed in this encounter. Orders Placed This Encounter   Medications    cephALEXin (KEFLEX) 500 MG capsule     Sig: Take 1 capsule by mouth 2 times daily for 7 days     Dispense:  14 capsule     Refill:  0    polyethylene glycol (GLYCOLAX) 17 GM/SCOOP powder     Sig: Take 17 g by mouth daily     Dispense:  1530 g     Refill:  1       Patient given educationalmaterials - see patient instructions. Discussed use, benefit, and side effectsof prescribed medications. All patient questions answered. Pt voiced understanding. Reviewed health maintenance. Instructed to continue current medications, diet andexercise.   Patient agreed with treatment plan. Follow up as directed.      Electronicallysigned by LO Trinh CNP on 12/14/2020 at 10:08 PM

## 2020-12-29 NOTE — PROGRESS NOTES
Pt here for C3D1. Denies any new complaints. Labs drawn and results reviewed. Pt states he has no appetite and nothing tastes good to him. Encouraged to eat several smaller meals through out the day. Has been taking ensure in the morning. Instructed him he should be taking 3-4 cans of ensure per day. Noticed a 13 lb weight loss in the past 6 weeks. Referral made to Vicky/dietician. States she will call pt later in the week. Pt was treated without incident and d/c'd in stable condition. Pt returns 1/19/21 for C4D1.

## 2020-12-30 NOTE — TELEPHONE ENCOUNTER
[facility] Nutrition Follow Up       Lian Cramer is a 80 y.o.  male     Subjective/Current Data:  Spoke with pt's daughter, Javier Jose, who provided extensive background to pt's healthcare and nutrition. Pt's wife has Alzheimer's so patient often cares for wife, who is diabetic. Pt with hx thyroid cancer and has minimal taste and smell, so eating is not an enjoyable experience. Pt is using Equate nutrition shake 1-2x per day, but RD recommends 3-4 per day d/t weight loss and poor PO intakes. Pt's daughter Javier Jose reports she empties pleural draink every 4-5days with approx 400-500mL out, sometimes blood-tinged. She reports pt was previously recommended an antidepressant and pt refused. RD indicated perhaps Remeron or an appetite stimulant would be beneficial for pt. Javier Jose also indicates aside from equate nutrition shakes, pt does not consume much liquid and often c/o dry mouth, fatigue, muscle weakness. Pt may benefit from weekly hydration. Pt has lost a significant amount of weight and does c/o weakness. Pt has not been referred to outpatient physical therapy yet and may benefit. Javier Jose states she is also inquiring about services available through the [facility] A[facility] and Hospice as she recognizes that both pt and his wife require a little more attention than their current independent living situation at [facility]. RD provided Javier Jose with various high calorie food recommendations for pt and his wife including P3 snacks, hard boiled eggs or egg salad, kodiak cakes frozen waffles, nuts, protein bars, etc. Pt's daughter appreciative of recommendations and advice. Total time 50 min.     Nutritional Requirements:  Estimated Energy Needs:  Weight Used: 74.1kg   Method Used: Padmini Tariq  Estimated kcal Needs: 8104-9041 kcal per day  Protein Needs:  Weight used: 74.1  1.2-1.4 g/kg =  g per day    Nutrition-focused Physical Findings  GI Symptoms: poor appetite, loss of taste and smell Skin: no issues reported  Intake vs. Estimated Needs: likely less than needs aeb continued unintentional weight loss    Nutrition Diagnosis and Goal  Problem:  Unintentional wt loss  Etiology/related to: catabolic illness  Symptoms/Signs/as evidenced by: reported poor PO intakes, lung ca with immunotherapy, wt loss  18.5% x 6months      Goal: Adequate intake to aide in wt maintenaince, signs and symptoms management, and overall wellbeing. Progress towards goal: gradually worsening    NUTRITION RECOMMENDATIONS / MONITORING / EVALUATION  1. Continue Equate nutrition shake but use 3-4x daily with encouraged small, frequent high calorie meal/snacks  2. Recommend consider appetite stimulant such as remeron, megace, marinol  3. Recommend consider scheduled weekly IV hydration  4. Recommend consider referral to outpatient physical therapy  5. Monitor wts, labs, PO intakes, s/s management, plan of care.     AYDIN Garcia, RD, LD  Registered Dietitian  Ashok Hunt  [phone]

## 2021-01-01 ENCOUNTER — OFFICE VISIT (OUTPATIENT)
Dept: ONCOLOGY | Age: 86
End: 2021-01-01
Payer: MEDICARE

## 2021-01-01 ENCOUNTER — TELEPHONE (OUTPATIENT)
Dept: PRIMARY CARE CLINIC | Age: 86
End: 2021-01-01

## 2021-01-01 ENCOUNTER — TELEPHONE (OUTPATIENT)
Dept: PULMONOLOGY | Age: 86
End: 2021-01-01

## 2021-01-01 ENCOUNTER — TELEPHONE (OUTPATIENT)
Dept: CASE MANAGEMENT | Age: 86
End: 2021-01-01

## 2021-01-01 ENCOUNTER — TELEPHONE (OUTPATIENT)
Dept: ONCOLOGY | Age: 86
End: 2021-01-01

## 2021-01-01 ENCOUNTER — HOSPITAL ENCOUNTER (OUTPATIENT)
Dept: INFUSION THERAPY | Age: 86
End: 2021-01-01
Payer: MEDICARE

## 2021-01-01 ENCOUNTER — OFFICE VISIT (OUTPATIENT)
Dept: ONCOLOGY | Age: 86
DRG: 389 | End: 2021-01-01
Payer: MEDICARE

## 2021-01-01 ENCOUNTER — OUTSIDE SERVICES (OUTPATIENT)
Dept: PRIMARY CARE CLINIC | Age: 86
End: 2021-01-01

## 2021-01-01 ENCOUNTER — OFFICE VISIT (OUTPATIENT)
Dept: PRIMARY CARE CLINIC | Age: 86
End: 2021-01-01
Payer: MEDICARE

## 2021-01-01 ENCOUNTER — HOSPITAL ENCOUNTER (OUTPATIENT)
Age: 86
Discharge: HOME | End: 2021-05-14
Payer: MEDICARE

## 2021-01-01 ENCOUNTER — APPOINTMENT (OUTPATIENT)
Dept: GENERAL RADIOLOGY | Age: 86
DRG: 389 | End: 2021-01-01
Payer: MEDICARE

## 2021-01-01 ENCOUNTER — HOSPITAL ENCOUNTER (OUTPATIENT)
Dept: INFUSION THERAPY | Facility: MEDICAL CENTER | Age: 86
Discharge: HOME | End: 2021-01-15
Payer: MEDICARE

## 2021-01-01 ENCOUNTER — HOSPITAL ENCOUNTER (EMERGENCY)
Age: 86
Discharge: HOME | End: 2021-04-14
Attending: EMERGENCY MEDICINE
Payer: MEDICARE

## 2021-01-01 ENCOUNTER — HOSPITAL ENCOUNTER (OUTPATIENT)
Dept: INFUSION THERAPY | Age: 86
Setting detail: INFUSION SERIES
Discharge: HOME | End: 2021-10-26
Attending: INTERNAL MEDICINE | Admitting: INTERNAL MEDICINE
Payer: MEDICARE

## 2021-01-01 ENCOUNTER — OFFICE VISIT (OUTPATIENT)
Dept: PULMONOLOGY | Age: 86
End: 2021-01-01
Payer: MEDICARE

## 2021-01-01 ENCOUNTER — HOSPITAL ENCOUNTER (OUTPATIENT)
Age: 86
Discharge: HOME | End: 2021-06-24
Payer: MEDICARE

## 2021-01-01 ENCOUNTER — HOSPITAL ENCOUNTER (OUTPATIENT)
Dept: CT IMAGING | Age: 86
Discharge: HOME | End: 2021-08-12
Payer: MEDICARE

## 2021-01-01 ENCOUNTER — HOSPITAL ENCOUNTER (OUTPATIENT)
Facility: MEDICAL CENTER | Age: 86
Discharge: HOME | End: 2021-01-15
Payer: MEDICARE

## 2021-01-01 ENCOUNTER — HOSPITAL ENCOUNTER (OUTPATIENT)
Dept: CT IMAGING | Age: 86
Discharge: HOME | End: 2021-05-06
Payer: MEDICARE

## 2021-01-01 ENCOUNTER — HOSPITAL ENCOUNTER (OUTPATIENT)
Dept: INTERVENTIONAL RADIOLOGY/VASCULAR | Age: 86
Discharge: HOME | End: 2021-05-15
Payer: MEDICARE

## 2021-01-01 ENCOUNTER — TELEPHONE (OUTPATIENT)
Dept: INFUSION THERAPY | Age: 86
End: 2021-01-01

## 2021-01-01 ENCOUNTER — APPOINTMENT (OUTPATIENT)
Dept: CT IMAGING | Age: 86
DRG: 872 | End: 2021-01-01
Payer: MEDICARE

## 2021-01-01 ENCOUNTER — HOSPITAL ENCOUNTER (OUTPATIENT)
Age: 86
Discharge: HOME | End: 2021-03-02
Payer: MEDICARE

## 2021-01-01 ENCOUNTER — HOSPITAL ENCOUNTER (OUTPATIENT)
Age: 86
Discharge: HOME | End: 2021-03-09
Payer: MEDICARE

## 2021-01-01 ENCOUNTER — HOSPITAL ENCOUNTER (INPATIENT)
Age: 86
LOS: 2 days | Discharge: HOME | DRG: 389 | End: 2021-04-20
Attending: EMERGENCY MEDICINE | Admitting: FAMILY MEDICINE
Payer: MEDICARE

## 2021-01-01 ENCOUNTER — APPOINTMENT (OUTPATIENT)
Dept: GENERAL RADIOLOGY | Age: 86
DRG: 872 | End: 2021-01-01
Payer: MEDICARE

## 2021-01-01 ENCOUNTER — HOSPITAL ENCOUNTER (OUTPATIENT)
Age: 86
Discharge: HOME | End: 2021-01-26
Payer: MEDICARE

## 2021-01-01 ENCOUNTER — APPOINTMENT (OUTPATIENT)
Dept: CT IMAGING | Age: 86
End: 2021-01-01
Payer: MEDICARE

## 2021-01-01 ENCOUNTER — HOSPITAL ENCOUNTER (OUTPATIENT)
Dept: INFUSION THERAPY | Age: 86
Discharge: HOME | DRG: 389 | End: 2021-04-16
Payer: MEDICARE

## 2021-01-01 ENCOUNTER — HOSPITAL ENCOUNTER (OUTPATIENT)
Facility: MEDICAL CENTER | Age: 86
End: 2021-01-01
Payer: MEDICARE

## 2021-01-01 ENCOUNTER — VIRTUAL VISIT (OUTPATIENT)
Dept: PULMONOLOGY | Age: 86
End: 2021-01-01
Payer: MEDICARE

## 2021-01-01 ENCOUNTER — HOSPITAL ENCOUNTER (OUTPATIENT)
Dept: CT IMAGING | Age: 86
Discharge: HOME | End: 2021-01-23
Payer: MEDICARE

## 2021-01-01 ENCOUNTER — HOSPITAL ENCOUNTER (OUTPATIENT)
Age: 86
Setting detail: SPECIMEN
Discharge: HOME | End: 2021-05-04
Payer: MEDICARE

## 2021-01-01 ENCOUNTER — OFFICE VISIT (OUTPATIENT)
Dept: PRIMARY CARE CLINIC | Age: 86
End: 2021-01-01

## 2021-01-01 ENCOUNTER — HOSPITAL ENCOUNTER (INPATIENT)
Age: 86
LOS: 3 days | Discharge: HOME HEALTH | DRG: 872 | End: 2021-03-30
Attending: EMERGENCY MEDICINE | Admitting: FAMILY MEDICINE
Payer: MEDICARE

## 2021-01-01 ENCOUNTER — TELEPHONE (OUTPATIENT)
Dept: GYNECOLOGIC ONCOLOGY | Age: 86
End: 2021-01-01

## 2021-01-01 VITALS — SYSTOLIC BLOOD PRESSURE: 118 MMHG | BODY MASS INDEX: 18.37 KG/M2 | WEIGHT: 124.4 LBS | DIASTOLIC BLOOD PRESSURE: 64 MMHG

## 2021-01-01 VITALS
DIASTOLIC BLOOD PRESSURE: 68 MMHG | TEMPERATURE: 97.9 F | SYSTOLIC BLOOD PRESSURE: 112 MMHG | WEIGHT: 141 LBS | BODY MASS INDEX: 20.88 KG/M2 | HEIGHT: 69 IN | RESPIRATION RATE: 18 BRPM | HEART RATE: 101 BPM | OXYGEN SATURATION: 99 %

## 2021-01-01 VITALS
OXYGEN SATURATION: 95 % | RESPIRATION RATE: 18 BRPM | HEART RATE: 88 BPM | DIASTOLIC BLOOD PRESSURE: 60 MMHG | SYSTOLIC BLOOD PRESSURE: 108 MMHG | TEMPERATURE: 98.2 F

## 2021-01-01 VITALS
RESPIRATION RATE: 16 BRPM | TEMPERATURE: 97.8 F | SYSTOLIC BLOOD PRESSURE: 109 MMHG | DIASTOLIC BLOOD PRESSURE: 50 MMHG | BODY MASS INDEX: 23.54 KG/M2 | WEIGHT: 159.4 LBS | HEART RATE: 92 BPM

## 2021-01-01 VITALS
OXYGEN SATURATION: 97 % | BODY MASS INDEX: 18.59 KG/M2 | SYSTOLIC BLOOD PRESSURE: 110 MMHG | HEART RATE: 67 BPM | WEIGHT: 125.9 LBS | DIASTOLIC BLOOD PRESSURE: 62 MMHG

## 2021-01-01 VITALS
SYSTOLIC BLOOD PRESSURE: 120 MMHG | HEART RATE: 94 BPM | BODY MASS INDEX: 18.9 KG/M2 | DIASTOLIC BLOOD PRESSURE: 70 MMHG | WEIGHT: 128 LBS | OXYGEN SATURATION: 99 % | TEMPERATURE: 96.1 F

## 2021-01-01 VITALS
BODY MASS INDEX: 19.23 KG/M2 | SYSTOLIC BLOOD PRESSURE: 106 MMHG | HEIGHT: 69 IN | DIASTOLIC BLOOD PRESSURE: 50 MMHG | WEIGHT: 129.8 LBS | OXYGEN SATURATION: 98 % | HEART RATE: 91 BPM

## 2021-01-01 VITALS
SYSTOLIC BLOOD PRESSURE: 119 MMHG | HEART RATE: 86 BPM | WEIGHT: 128.9 LBS | TEMPERATURE: 97.6 F | BODY MASS INDEX: 19.04 KG/M2 | DIASTOLIC BLOOD PRESSURE: 77 MMHG | RESPIRATION RATE: 16 BRPM

## 2021-01-01 VITALS
HEART RATE: 85 BPM | WEIGHT: 129.5 LBS | BODY MASS INDEX: 19.12 KG/M2 | SYSTOLIC BLOOD PRESSURE: 118 MMHG | DIASTOLIC BLOOD PRESSURE: 78 MMHG

## 2021-01-01 VITALS
RESPIRATION RATE: 18 BRPM | HEIGHT: 69 IN | OXYGEN SATURATION: 96 % | BODY MASS INDEX: 21.09 KG/M2 | HEART RATE: 85 BPM | TEMPERATURE: 98.3 F | SYSTOLIC BLOOD PRESSURE: 125 MMHG | DIASTOLIC BLOOD PRESSURE: 71 MMHG | WEIGHT: 142.42 LBS

## 2021-01-01 VITALS
HEART RATE: 78 BPM | SYSTOLIC BLOOD PRESSURE: 166 MMHG | RESPIRATION RATE: 24 BRPM | OXYGEN SATURATION: 98 % | DIASTOLIC BLOOD PRESSURE: 84 MMHG | TEMPERATURE: 97.7 F

## 2021-01-01 VITALS — WEIGHT: 128.4 LBS | DIASTOLIC BLOOD PRESSURE: 68 MMHG | BODY MASS INDEX: 18.96 KG/M2 | SYSTOLIC BLOOD PRESSURE: 130 MMHG

## 2021-01-01 VITALS
DIASTOLIC BLOOD PRESSURE: 66 MMHG | HEART RATE: 99 BPM | BODY MASS INDEX: 20.93 KG/M2 | SYSTOLIC BLOOD PRESSURE: 122 MMHG | TEMPERATURE: 97.9 F | WEIGHT: 141.7 LBS

## 2021-01-01 VITALS
TEMPERATURE: 97.2 F | WEIGHT: 138 LBS | DIASTOLIC BLOOD PRESSURE: 62 MMHG | SYSTOLIC BLOOD PRESSURE: 100 MMHG | BODY MASS INDEX: 20.38 KG/M2

## 2021-01-01 VITALS
RESPIRATION RATE: 16 BRPM | SYSTOLIC BLOOD PRESSURE: 117 MMHG | TEMPERATURE: 97.7 F | DIASTOLIC BLOOD PRESSURE: 65 MMHG | HEART RATE: 83 BPM | HEART RATE: 111 BPM | DIASTOLIC BLOOD PRESSURE: 50 MMHG | SYSTOLIC BLOOD PRESSURE: 106 MMHG | WEIGHT: 156.7 LBS | TEMPERATURE: 97.5 F | WEIGHT: 128.2 LBS | BODY MASS INDEX: 18.93 KG/M2 | BODY MASS INDEX: 23.14 KG/M2

## 2021-01-01 VITALS
HEART RATE: 92 BPM | HEIGHT: 69 IN | DIASTOLIC BLOOD PRESSURE: 57 MMHG | RESPIRATION RATE: 16 BRPM | TEMPERATURE: 96.8 F | WEIGHT: 132.72 LBS | OXYGEN SATURATION: 97 % | SYSTOLIC BLOOD PRESSURE: 97 MMHG | BODY MASS INDEX: 19.66 KG/M2

## 2021-01-01 VITALS
HEART RATE: 102 BPM | WEIGHT: 131.8 LBS | BODY MASS INDEX: 19.46 KG/M2 | DIASTOLIC BLOOD PRESSURE: 75 MMHG | SYSTOLIC BLOOD PRESSURE: 119 MMHG | TEMPERATURE: 97.7 F

## 2021-01-01 VITALS
OXYGEN SATURATION: 98 % | TEMPERATURE: 97 F | HEART RATE: 83 BPM | SYSTOLIC BLOOD PRESSURE: 130 MMHG | WEIGHT: 156.6 LBS | DIASTOLIC BLOOD PRESSURE: 60 MMHG | BODY MASS INDEX: 23.13 KG/M2

## 2021-01-01 VITALS
TEMPERATURE: 97.5 F | SYSTOLIC BLOOD PRESSURE: 145 MMHG | RESPIRATION RATE: 16 BRPM | BODY MASS INDEX: 19.26 KG/M2 | OXYGEN SATURATION: 100 % | WEIGHT: 130 LBS | HEART RATE: 87 BPM | HEIGHT: 69 IN | DIASTOLIC BLOOD PRESSURE: 68 MMHG

## 2021-01-01 VITALS
OXYGEN SATURATION: 93 % | TEMPERATURE: 97.2 F | RESPIRATION RATE: 18 BRPM | HEART RATE: 86 BPM | DIASTOLIC BLOOD PRESSURE: 50 MMHG | SYSTOLIC BLOOD PRESSURE: 92 MMHG

## 2021-01-01 VITALS
RESPIRATION RATE: 16 BRPM | SYSTOLIC BLOOD PRESSURE: 102 MMHG | TEMPERATURE: 95.4 F | BODY MASS INDEX: 20.95 KG/M2 | DIASTOLIC BLOOD PRESSURE: 55 MMHG | WEIGHT: 141.9 LBS | HEART RATE: 110 BPM

## 2021-01-01 DIAGNOSIS — R05.9 COUGH: ICD-10-CM

## 2021-01-01 DIAGNOSIS — Z00.00 ROUTINE GENERAL MEDICAL EXAMINATION AT A HEALTH CARE FACILITY: Primary | ICD-10-CM

## 2021-01-01 DIAGNOSIS — S41.001A OPEN WOUND OF RIGHT SHOULDER, INITIAL ENCOUNTER: ICD-10-CM

## 2021-01-01 DIAGNOSIS — Z91.81 AT HIGH RISK FOR FALLS: ICD-10-CM

## 2021-01-01 DIAGNOSIS — R25.1 MUSCLE TREMOR: Primary | ICD-10-CM

## 2021-01-01 DIAGNOSIS — M19.90 OSTEOARTHRITIS, UNSPECIFIED OSTEOARTHRITIS TYPE, UNSPECIFIED SITE: Primary | ICD-10-CM

## 2021-01-01 DIAGNOSIS — R60.0 LOCALIZED EDEMA: ICD-10-CM

## 2021-01-01 DIAGNOSIS — C34.91 NON-SMALL CELL CARCINOMA OF RIGHT LUNG, STAGE 4 (HCC): ICD-10-CM

## 2021-01-01 DIAGNOSIS — J91.0 MALIGNANT PLEURAL EFFUSION: Primary | ICD-10-CM

## 2021-01-01 DIAGNOSIS — R91.8 LUNG MASS: ICD-10-CM

## 2021-01-01 DIAGNOSIS — E83.52 HYPERCALCEMIA OF MALIGNANCY: ICD-10-CM

## 2021-01-01 DIAGNOSIS — L03.119 CELLULITIS OF LOWER EXTREMITY, UNSPECIFIED LATERALITY: ICD-10-CM

## 2021-01-01 DIAGNOSIS — R60.0 LOCALIZED EDEMA: Primary | ICD-10-CM

## 2021-01-01 DIAGNOSIS — S41.001A OPEN WOUND OF RIGHT SHOULDER, INITIAL ENCOUNTER: Primary | ICD-10-CM

## 2021-01-01 DIAGNOSIS — I10 ESSENTIAL HYPERTENSION: ICD-10-CM

## 2021-01-01 DIAGNOSIS — R77.8 ELEVATED TROPONIN: ICD-10-CM

## 2021-01-01 DIAGNOSIS — R09.89 RALES: ICD-10-CM

## 2021-01-01 DIAGNOSIS — C34.91 NON-SMALL CELL CARCINOMA OF RIGHT LUNG, STAGE 4 (HCC): Primary | ICD-10-CM

## 2021-01-01 DIAGNOSIS — Z23 NEED FOR INFLUENZA VACCINATION: ICD-10-CM

## 2021-01-01 DIAGNOSIS — Z85.46 HISTORY OF PROSTATE CANCER: ICD-10-CM

## 2021-01-01 DIAGNOSIS — M19.90 OSTEOARTHRITIS, UNSPECIFIED OSTEOARTHRITIS TYPE, UNSPECIFIED SITE: ICD-10-CM

## 2021-01-01 DIAGNOSIS — J90 PLEURAL EFFUSION ON RIGHT: ICD-10-CM

## 2021-01-01 DIAGNOSIS — U07.1 COVID: ICD-10-CM
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
## 2021-01-01 DIAGNOSIS — R53.1 GENERAL WEAKNESS: ICD-10-CM

## 2021-01-01 DIAGNOSIS — C34.91 PRIMARY ADENOCARCINOMA OF RIGHT LUNG (HCC): Primary | ICD-10-CM

## 2021-01-01 DIAGNOSIS — B37.2 CUTANEOUS CANDIDIASIS: ICD-10-CM

## 2021-01-01 DIAGNOSIS — K92.1 BLACK STOOLS: ICD-10-CM

## 2021-01-01 DIAGNOSIS — Z22.322 MRSA (METHICILLIN RESISTANT STAPH AUREUS) CULTURE POSITIVE: Primary | ICD-10-CM

## 2021-01-01 DIAGNOSIS — E83.52 HYPERCALCEMIA: Primary | ICD-10-CM

## 2021-01-01 DIAGNOSIS — E44.0 MODERATE PROTEIN-CALORIE MALNUTRITION (HCC): ICD-10-CM

## 2021-01-01 DIAGNOSIS — C34.91 PRIMARY ADENOCARCINOMA OF RIGHT LUNG (HCC): ICD-10-CM

## 2021-01-01 DIAGNOSIS — U07.1 COVID-19: Primary | ICD-10-CM

## 2021-01-01 DIAGNOSIS — U07.1 COVID-19: ICD-10-CM

## 2021-01-01 DIAGNOSIS — E83.52 HYPERCALCEMIA: ICD-10-CM

## 2021-01-01 DIAGNOSIS — S09.90XA INJURY OF HEAD, INITIAL ENCOUNTER: Primary | ICD-10-CM

## 2021-01-01 DIAGNOSIS — K59.00 CONSTIPATION, UNSPECIFIED CONSTIPATION TYPE: ICD-10-CM

## 2021-01-01 DIAGNOSIS — Z85.850 HISTORY OF MALIGNANT NEOPLASM OF THYROID: Primary | ICD-10-CM

## 2021-01-01 DIAGNOSIS — J91.0 PLEURAL EFFUSION, MALIGNANT: ICD-10-CM

## 2021-01-01 DIAGNOSIS — E78.5 HYPERLIPIDEMIA, UNSPECIFIED HYPERLIPIDEMIA TYPE: ICD-10-CM

## 2021-01-01 DIAGNOSIS — I10 ESSENTIAL HYPERTENSION: Primary | ICD-10-CM

## 2021-01-01 DIAGNOSIS — J91.0 PLEURAL EFFUSION, MALIGNANT: Primary | ICD-10-CM

## 2021-01-01 DIAGNOSIS — G25.2 COARSE TREMOR: ICD-10-CM

## 2021-01-01 DIAGNOSIS — K56.7 ILEUS (HCC): ICD-10-CM

## 2021-01-01 DIAGNOSIS — E89.0 POSTOPERATIVE HYPOTHYROIDISM: ICD-10-CM

## 2021-01-01 DIAGNOSIS — J90 PLEURAL EFFUSION: Primary | ICD-10-CM

## 2021-01-01 LAB
-: ABNORMAL
-: ABNORMAL
ABSOLUTE EOS #: 0 K/UL (ref 0–0.4)
ABSOLUTE EOS #: 0.1 K/UL (ref 0–0.4)
ABSOLUTE EOS #: 0.1 K/UL (ref 0–0.4)
ABSOLUTE EOS #: 0.2 K/UL (ref 0–0.4)
ABSOLUTE EOS #: 0.3 K/UL (ref 0–0.4)
ABSOLUTE EOS #: 0.32 K/UL (ref 0–0.44)
ABSOLUTE IMMATURE GRANULOCYTE: 0.01 K/UL (ref 0–0.3)
ABSOLUTE IMMATURE GRANULOCYTE: ABNORMAL K/UL (ref 0–0.3)
ABSOLUTE LYMPH #: 0.35 K/UL (ref 1–4.8)
ABSOLUTE LYMPH #: 0.43 K/UL (ref 1–4.8)
ABSOLUTE LYMPH #: 0.47 K/UL (ref 1–4.8)
ABSOLUTE LYMPH #: 0.5 K/UL (ref 1–4.8)
ABSOLUTE LYMPH #: 0.6 K/UL (ref 1–4.8)
ABSOLUTE LYMPH #: 0.7 K/UL (ref 1–4.8)
ABSOLUTE LYMPH #: 0.8 K/UL (ref 1–4.8)
ABSOLUTE LYMPH #: 0.84 K/UL (ref 1.1–3.7)
ABSOLUTE LYMPH #: 1 K/UL (ref 1–4.8)
ABSOLUTE MONO #: 0.22 K/UL (ref 0.1–0.8)
ABSOLUTE MONO #: 0.36 K/UL (ref 0.1–0.8)
ABSOLUTE MONO #: 0.4 K/UL (ref 0.1–1.2)
ABSOLUTE MONO #: 0.5 K/UL (ref 0.1–1.2)
ABSOLUTE MONO #: 0.58 K/UL (ref 0.1–0.8)
ABSOLUTE MONO #: 0.6 K/UL (ref 0.1–1.2)
ABSOLUTE MONO #: 0.7 K/UL (ref 0.1–1.2)
ABSOLUTE MONO #: 0.83 K/UL (ref 0.1–1.2)
ALBUMIN SERPL-MCNC: 2.4 G/DL
ALBUMIN SERPL-MCNC: 2.6 G/DL (ref 3.5–5.2)
ALBUMIN SERPL-MCNC: 3.1 G/DL (ref 3.5–5.2)
ALBUMIN SERPL-MCNC: 3.5 G/DL (ref 3.5–5.2)
ALBUMIN SERPL-MCNC: 3.6 G/DL (ref 3.5–5.2)
ALBUMIN SERPL-MCNC: 3.8 G/DL (ref 3.5–5.2)
ALBUMIN SERPL-MCNC: NORMAL G/DL
ALBUMIN/GLOBULIN RATIO: 1 (ref 1–2.5)
ALBUMIN/GLOBULIN RATIO: 1.2 (ref 1–2.5)
ALBUMIN/GLOBULIN RATIO: 1.2 (ref 1–2.5)
ALBUMIN/GLOBULIN RATIO: 1.4 (ref 1–2.5)
ALBUMIN/GLOBULIN RATIO: 1.5 (ref 1–2.5)
ALBUMIN/GLOBULIN RATIO: 1.9 (ref 1–2.5)
ALBUMIN/GLOBULIN RATIO: ABNORMAL (ref 1–2.5)
ALP BLD-CCNC: 102 U/L (ref 40–129)
ALP BLD-CCNC: 60 U/L (ref 40–129)
ALP BLD-CCNC: 64 U/L
ALP BLD-CCNC: 65 U/L (ref 40–129)
ALP BLD-CCNC: 67 U/L (ref 40–129)
ALP BLD-CCNC: 70 U/L (ref 40–129)
ALP BLD-CCNC: 91 U/L (ref 40–129)
ALP BLD-CCNC: 93 U/L (ref 40–129)
ALP BLD-CCNC: NORMAL U/L
ALT SERPL-CCNC: 15 U/L (ref 5–41)
ALT SERPL-CCNC: 4 U/L
ALT SERPL-CCNC: 6 U/L (ref 5–41)
ALT SERPL-CCNC: 6 U/L (ref 5–41)
ALT SERPL-CCNC: 7 U/L (ref 5–41)
ALT SERPL-CCNC: 7 U/L (ref 5–41)
ALT SERPL-CCNC: 8 U/L (ref 5–41)
ALT SERPL-CCNC: <5 U/L (ref 5–41)
ALT SERPL-CCNC: NORMAL U/L
AMORPHOUS: ABNORMAL
AMORPHOUS: ABNORMAL
ANION GAP SERPL CALCULATED.3IONS-SCNC: 10 MMOL/L (ref 9–17)
ANION GAP SERPL CALCULATED.3IONS-SCNC: 11 MMOL/L (ref 9–17)
ANION GAP SERPL CALCULATED.3IONS-SCNC: 13 MMOL/L (ref 9–17)
ANION GAP SERPL CALCULATED.3IONS-SCNC: 17 MMOL/L (ref 9–17)
ANION GAP SERPL CALCULATED.3IONS-SCNC: 4 MMOL/L (ref 9–17)
ANION GAP SERPL CALCULATED.3IONS-SCNC: 6 MMOL/L (ref 9–17)
ANION GAP SERPL CALCULATED.3IONS-SCNC: 7 MMOL/L (ref 9–17)
ANION GAP SERPL CALCULATED.3IONS-SCNC: 8 MMOL/L (ref 9–17)
ANION GAP SERPL CALCULATED.3IONS-SCNC: 9 MMOL/L (ref 9–17)
ANION GAP SERPL CALCULATED.3IONS-SCNC: NORMAL MMOL/L
AST SERPL-CCNC: 11 U/L
AST SERPL-CCNC: 13 U/L
AST SERPL-CCNC: 14 U/L
AST SERPL-CCNC: 15 U/L
AST SERPL-CCNC: 16 U/L
AST SERPL-CCNC: 18 U/L
AST SERPL-CCNC: 18 U/L
AST SERPL-CCNC: 90 U/L
AST SERPL-CCNC: NORMAL U/L
BACTERIA: ABNORMAL
BACTERIA: ABNORMAL
BASOPHILS # BLD: 0 % (ref 0–2)
BASOPHILS # BLD: 1 % (ref 0–2)
BASOPHILS # BLD: 2 % (ref 0–2)
BASOPHILS # BLD: 2 % (ref 0–2)
BASOPHILS ABSOLUTE: 0 K/UL (ref 0–0.2)
BASOPHILS ABSOLUTE: 0.04 K/UL (ref 0–0.2)
BASOPHILS ABSOLUTE: 0.1 /ΜL
BASOPHILS ABSOLUTE: 0.1 K/UL (ref 0–0.2)
BASOPHILS ABSOLUTE: ABNORMAL
BASOPHILS RELATIVE PERCENT: ABNORMAL
BASOPHILS RELATIVE PERCENT: ABNORMAL
BILIRUB SERPL-MCNC: 0.2 MG/DL (ref 0.3–1.2)
BILIRUB SERPL-MCNC: 0.22 MG/DL (ref 0.3–1.2)
BILIRUB SERPL-MCNC: 0.4 MG/DL (ref 0.1–1.4)
BILIRUB SERPL-MCNC: 0.42 MG/DL (ref 0.3–1.2)
BILIRUB SERPL-MCNC: 0.45 MG/DL (ref 0.3–1.2)
BILIRUB SERPL-MCNC: 0.47 MG/DL (ref 0.3–1.2)
BILIRUB SERPL-MCNC: 0.5 MG/DL (ref 0.3–1.2)
BILIRUB SERPL-MCNC: 0.72 MG/DL (ref 0.3–1.2)
BILIRUB SERPL-MCNC: NORMAL MG/DL
BILIRUBIN DIRECT: 0.12 MG/DL
BILIRUBIN URINE: ABNORMAL
BILIRUBIN URINE: NEGATIVE
BILIRUBIN, INDIRECT: 0.1 MG/DL (ref 0–1)
BNP INTERPRETATION: ABNORMAL
BNP INTERPRETATION: NORMAL
BUN BLDV-MCNC: 10 MG/DL (ref 8–23)
BUN BLDV-MCNC: 11 MG/DL
BUN BLDV-MCNC: 11 MG/DL (ref 8–23)
BUN BLDV-MCNC: 12 MG/DL (ref 8–23)
BUN BLDV-MCNC: 13 MG/DL
BUN BLDV-MCNC: 13 MG/DL (ref 8–23)
BUN BLDV-MCNC: 14 MG/DL (ref 8–23)
BUN BLDV-MCNC: 15 MG/DL (ref 8–23)
BUN BLDV-MCNC: 16 MG/DL (ref 8–23)
BUN BLDV-MCNC: 17 MG/DL (ref 8–23)
BUN BLDV-MCNC: 24 MG/DL (ref 8–23)
BUN BLDV-MCNC: 26 MG/DL (ref 8–23)
BUN BLDV-MCNC: 29 MG/DL (ref 8–23)
BUN BLDV-MCNC: 30 MG/DL (ref 8–23)
BUN BLDV-MCNC: 9 MG/DL
BUN BLDV-MCNC: 9 MG/DL (ref 8–23)
BUN BLDV-MCNC: 9 MG/DL (ref 8–23)
BUN/CREAT BLD: 16
BUN/CREAT BLD: 27 (ref 9–20)
BUN/CREAT BLD: ABNORMAL (ref 9–20)
CALCIUM IONIZED: 1.08 MMOL/L (ref 1.13–1.33)
CALCIUM IONIZED: 1.18 MMOL/L (ref 1.13–1.33)
CALCIUM SERPL-MCNC: 10.4 MG/DL (ref 8.6–10.4)
CALCIUM SERPL-MCNC: 10.4 MG/DL (ref 8.6–10.4)
CALCIUM SERPL-MCNC: 12.6 MG/DL (ref 8.6–10.4)
CALCIUM SERPL-MCNC: 6.9 MG/DL (ref 8.6–10.4)
CALCIUM SERPL-MCNC: 7 MG/DL (ref 8.6–10.4)
CALCIUM SERPL-MCNC: 7.1 MG/DL (ref 8.6–10.4)
CALCIUM SERPL-MCNC: 7.3 MG/DL (ref 8.6–10.4)
CALCIUM SERPL-MCNC: 7.4 MG/DL (ref 8.6–10.4)
CALCIUM SERPL-MCNC: 7.6 MG/DL (ref 8.6–10.4)
CALCIUM SERPL-MCNC: 7.7 MG/DL
CALCIUM SERPL-MCNC: 7.7 MG/DL (ref 8.6–10.4)
CALCIUM SERPL-MCNC: 7.8 MG/DL
CALCIUM SERPL-MCNC: 7.8 MG/DL (ref 8.6–10.4)
CALCIUM SERPL-MCNC: 7.9 MG/DL
CALCIUM SERPL-MCNC: 8.1 MG/DL (ref 8.6–10.4)
CALCIUM SERPL-MCNC: 8.4 MG/DL (ref 8.6–10.4)
CALCIUM SERPL-MCNC: 9.1 MG/DL (ref 8.6–10.4)
CASTS UA: ABNORMAL /LPF
CHLORIDE BLD-SCNC: 100 MMOL/L (ref 98–107)
CHLORIDE BLD-SCNC: 104 MMOL/L
CHLORIDE BLD-SCNC: 104 MMOL/L (ref 98–107)
CHLORIDE BLD-SCNC: 105 MMOL/L (ref 98–107)
CHLORIDE BLD-SCNC: 106 MMOL/L (ref 98–107)
CHLORIDE BLD-SCNC: 106 MMOL/L (ref 98–107)
CHLORIDE BLD-SCNC: 107 MMOL/L
CHLORIDE BLD-SCNC: 108 MMOL/L
CHLORIDE BLD-SCNC: 95 MMOL/L (ref 98–107)
CHLORIDE BLD-SCNC: 97 MMOL/L (ref 98–107)
CHLORIDE BLD-SCNC: 99 MMOL/L (ref 98–107)
CHLORIDE BLD-SCNC: 99 MMOL/L (ref 98–107)
CO2: 18 MMOL/L (ref 20–31)
CO2: 22 MMOL/L (ref 20–31)
CO2: 23 MMOL/L (ref 20–31)
CO2: 27 MMOL/L
CO2: 27 MMOL/L (ref 20–31)
CO2: 28 MMOL/L (ref 20–31)
CO2: 29 MMOL/L (ref 20–31)
CO2: 30 MMOL/L
CO2: 30 MMOL/L
COLOR: YELLOW
COLOR: YELLOW
COMMENT UA: ABNORMAL
COMMENT UA: ABNORMAL
CREAT SERPL-MCNC: 0.48 MG/DL (ref 0.7–1.2)
CREAT SERPL-MCNC: 0.49 MG/DL (ref 0.7–1.2)
CREAT SERPL-MCNC: 0.5 MG/DL
CREAT SERPL-MCNC: 0.5 MG/DL (ref 0.7–1.2)
CREAT SERPL-MCNC: 0.53 MG/DL (ref 0.7–1.2)
CREAT SERPL-MCNC: 0.54 MG/DL (ref 0.7–1.2)
CREAT SERPL-MCNC: 0.55 MG/DL (ref 0.7–1.2)
CREAT SERPL-MCNC: 0.56 MG/DL (ref 0.7–1.2)
CREAT SERPL-MCNC: 0.56 MG/DL (ref 0.7–1.2)
CREAT SERPL-MCNC: 0.57 MG/DL (ref 0.7–1.2)
CREAT SERPL-MCNC: 0.6 MG/DL
CREAT SERPL-MCNC: 0.63 MG/DL (ref 0.7–1.2)
CREAT SERPL-MCNC: 0.65 MG/DL (ref 0.7–1.2)
CREAT SERPL-MCNC: 0.7 MG/DL
CREAT SERPL-MCNC: 0.71 MG/DL (ref 0.7–1.2)
CREAT SERPL-MCNC: 0.75 MG/DL (ref 0.7–1.2)
CREAT SERPL-MCNC: 1.12 MG/DL (ref 0.7–1.2)
CRYSTALS, UA: ABNORMAL /HPF
CRYSTALS, UA: ABNORMAL /HPF
CULTURE: ABNORMAL
CULTURE: NO GROWTH
CULTURE: NORMAL
CULTURE: NORMAL
DIFFERENTIAL TYPE: ABNORMAL
DIRECT EXAM: ABNORMAL
EKG ATRIAL RATE: 123 BPM
EKG ATRIAL RATE: 33 BPM
EKG ATRIAL RATE: 88 BPM
EKG P AXIS: 117 DEGREES
EKG P AXIS: 44 DEGREES
EKG P-R INTERVAL: 156 MS
EKG P-R INTERVAL: 184 MS
EKG Q-T INTERVAL: 328 MS
EKG Q-T INTERVAL: 380 MS
EKG Q-T INTERVAL: 400 MS
EKG QRS DURATION: 80 MS
EKG QRS DURATION: 90 MS
EKG QRS DURATION: 92 MS
EKG QTC CALCULATION (BAZETT): 469 MS
EKG QTC CALCULATION (BAZETT): 480 MS
EKG QTC CALCULATION (BAZETT): 484 MS
EKG R AXIS: 81 DEGREES
EKG R AXIS: 83 DEGREES
EKG R AXIS: 92 DEGREES
EKG T AXIS: 71 DEGREES
EKG T AXIS: 78 DEGREES
EKG T AXIS: 82 DEGREES
EKG VENTRICULAR RATE: 123 BPM
EKG VENTRICULAR RATE: 88 BPM
EKG VENTRICULAR RATE: 96 BPM
EOSINOPHILS ABSOLUTE: 0.3 /ΜL
EOSINOPHILS ABSOLUTE: ABNORMAL
EOSINOPHILS RELATIVE PERCENT: 0 % (ref 1–4)
EOSINOPHILS RELATIVE PERCENT: 1 % (ref 1–4)
EOSINOPHILS RELATIVE PERCENT: 2 % (ref 1–4)
EOSINOPHILS RELATIVE PERCENT: 4 % (ref 1–4)
EOSINOPHILS RELATIVE PERCENT: 5 % (ref 1–4)
EOSINOPHILS RELATIVE PERCENT: 5 % (ref 1–4)
EOSINOPHILS RELATIVE PERCENT: ABNORMAL
EOSINOPHILS RELATIVE PERCENT: ABNORMAL
EPITHELIAL CELLS UA: ABNORMAL /HPF (ref 0–5)
EPITHELIAL CELLS UA: ABNORMAL /HPF (ref 0–5)
FERRITIN: 464 UG/L (ref 30–400)
FOLATE: 4.9 NG/ML
GFR AFRICAN AMERICAN: >60 ML/MIN
GFR AFRICAN AMERICAN: NORMAL
GFR CALCULATED: 127
GFR CALCULATED: 157
GFR NON-AFRICAN AMERICAN: 129
GFR NON-AFRICAN AMERICAN: >60 ML/MIN
GFR SERPL CREATININE-BSD FRML MDRD: ABNORMAL ML/MIN/{1.73_M2}
GLOBULIN: ABNORMAL G/DL (ref 1.5–3.8)
GLUCOSE BLD-MCNC: 101 MG/DL (ref 70–99)
GLUCOSE BLD-MCNC: 102 MG/DL (ref 70–99)
GLUCOSE BLD-MCNC: 105 MG/DL (ref 70–99)
GLUCOSE BLD-MCNC: 108 MG/DL (ref 70–99)
GLUCOSE BLD-MCNC: 114 MG/DL (ref 70–99)
GLUCOSE BLD-MCNC: 116 MG/DL (ref 70–99)
GLUCOSE BLD-MCNC: 118 MG/DL (ref 70–99)
GLUCOSE BLD-MCNC: 121 MG/DL (ref 70–99)
GLUCOSE BLD-MCNC: 125 MG/DL (ref 70–99)
GLUCOSE BLD-MCNC: 127 MG/DL (ref 70–99)
GLUCOSE BLD-MCNC: 132 MG/DL (ref 70–99)
GLUCOSE BLD-MCNC: 138 MG/DL (ref 70–99)
GLUCOSE BLD-MCNC: 153 MG/DL (ref 70–99)
GLUCOSE BLD-MCNC: 68 MG/DL
GLUCOSE BLD-MCNC: 77 MG/DL
GLUCOSE BLD-MCNC: 97 MG/DL (ref 70–99)
GLUCOSE FASTING: 69 MG/DL
GLUCOSE URINE: NEGATIVE
GLUCOSE URINE: NEGATIVE
HCT VFR BLD CALC: 25.1 % (ref 41–53)
HCT VFR BLD CALC: 25.4 % (ref 41–53)
HCT VFR BLD CALC: 25.4 % (ref 41–53)
HCT VFR BLD CALC: 26.4 % (ref 41–53)
HCT VFR BLD CALC: 27.6 % (ref 41–53)
HCT VFR BLD CALC: 28.2 % (ref 41–53)
HCT VFR BLD CALC: 29.6 % (ref 41–53)
HCT VFR BLD CALC: 30.2 % (ref 41–53)
HCT VFR BLD CALC: 36.9 % (ref 41–53)
HCT VFR BLD CALC: 37.5 % (ref 41–53)
HCT VFR BLD CALC: 37.8 % (ref 41–53)
HCT VFR BLD CALC: 38 % (ref 40.7–50.3)
HCT VFR BLD CALC: 39.1 % (ref 41–53)
HEMOGLOBIN: 11.8 G/DL (ref 13–17)
HEMOGLOBIN: 12.1 G/DL (ref 13.5–17.5)
HEMOGLOBIN: 12.3 G/DL (ref 13.5–17.5)
HEMOGLOBIN: 12.5 G/DL (ref 13.5–17.5)
HEMOGLOBIN: 12.8 G/DL (ref 13.5–17.5)
HEMOGLOBIN: 8 G/DL (ref 13.5–17.5)
HEMOGLOBIN: 8.2 G/DL (ref 13.5–17.5)
HEMOGLOBIN: 8.4 G/DL (ref 13.5–17.5)
HEMOGLOBIN: 8.5 G/DL (ref 13.5–17.5)
HEMOGLOBIN: 8.7 G/DL (ref 13.5–17.5)
HEMOGLOBIN: 9 G/DL (ref 13.5–17.5)
HEMOGLOBIN: 9.5 G/DL (ref 13.5–17.5)
HEMOGLOBIN: 9.6 G/DL (ref 13.5–17.5)
IMMATURE GRANULOCYTES: 0 %
IMMATURE GRANULOCYTES: ABNORMAL %
INR BLD: 1.2
IRON SATURATION: 18 % (ref 20–55)
IRON: 34 UG/DL (ref 59–158)
KETONES, URINE: ABNORMAL
KETONES, URINE: NEGATIVE
LACTIC ACID, SEPSIS WHOLE BLOOD: ABNORMAL MMOL/L (ref 0.5–1.9)
LACTIC ACID, SEPSIS WHOLE BLOOD: ABNORMAL MMOL/L (ref 0.5–1.9)
LACTIC ACID, SEPSIS WHOLE BLOOD: NORMAL MMOL/L (ref 0.5–1.9)
LACTIC ACID, SEPSIS WHOLE BLOOD: NORMAL MMOL/L (ref 0.5–1.9)
LACTIC ACID, SEPSIS: 1.8 MMOL/L (ref 0.5–1.9)
LACTIC ACID, SEPSIS: 1.9 MMOL/L (ref 0.5–1.9)
LACTIC ACID, SEPSIS: 2.1 MMOL/L (ref 0.5–1.9)
LACTIC ACID, SEPSIS: 8.1 MMOL/L (ref 0.5–1.9)
LACTIC ACID: 1.5 MMOL/L (ref 0.5–2.2)
LEUKOCYTE ESTERASE, URINE: NEGATIVE
LEUKOCYTE ESTERASE, URINE: NEGATIVE
LIPASE: 94 U/L (ref 13–60)
LYMPHOCYTES # BLD: 10 % (ref 24–44)
LYMPHOCYTES # BLD: 11 % (ref 24–44)
LYMPHOCYTES # BLD: 11 % (ref 24–44)
LYMPHOCYTES # BLD: 12 % (ref 24–44)
LYMPHOCYTES # BLD: 13 % (ref 24–44)
LYMPHOCYTES # BLD: 14 % (ref 24–43)
LYMPHOCYTES # BLD: 14 % (ref 24–44)
LYMPHOCYTES # BLD: 15 % (ref 24–44)
LYMPHOCYTES # BLD: 25 % (ref 24–44)
LYMPHOCYTES # BLD: 25 % (ref 24–44)
LYMPHOCYTES # BLD: 6 % (ref 24–44)
LYMPHOCYTES ABSOLUTE: 0.6 /ΜL
LYMPHOCYTES ABSOLUTE: ABNORMAL
LYMPHOCYTES RELATIVE PERCENT: 13.1 %
LYMPHOCYTES RELATIVE PERCENT: ABNORMAL
Lab: ABNORMAL
Lab: NORMAL
MCH RBC QN AUTO: 27.7 PG (ref 26–34)
MCH RBC QN AUTO: 27.8 PG (ref 26–34)
MCH RBC QN AUTO: 28.2 PG (ref 26–34)
MCH RBC QN AUTO: 28.3 PG (ref 26–34)
MCH RBC QN AUTO: 28.4 PG (ref 26–34)
MCH RBC QN AUTO: 29.1 PG
MCH RBC QN AUTO: 29.1 PG
MCH RBC QN AUTO: 29.1 PG (ref 26–34)
MCH RBC QN AUTO: 29.2 PG (ref 25.2–33.5)
MCH RBC QN AUTO: 29.2 PG (ref 26–34)
MCH RBC QN AUTO: 29.4 PG (ref 26–34)
MCHC RBC AUTO-ENTMCNC: 31.1 G/DL (ref 28.4–34.8)
MCHC RBC AUTO-ENTMCNC: 31.6 G/DL (ref 31–37)
MCHC RBC AUTO-ENTMCNC: 31.8 G/DL (ref 31–37)
MCHC RBC AUTO-ENTMCNC: 32.2 G/DL
MCHC RBC AUTO-ENTMCNC: 32.2 G/DL (ref 31–37)
MCHC RBC AUTO-ENTMCNC: 32.3 G/DL (ref 31–37)
MCHC RBC AUTO-ENTMCNC: 32.7 G/DL (ref 31–37)
MCHC RBC AUTO-ENTMCNC: 32.7 G/DL (ref 31–37)
MCHC RBC AUTO-ENTMCNC: 32.8 G/DL (ref 31–37)
MCHC RBC AUTO-ENTMCNC: 32.9 G/DL
MCHC RBC AUTO-ENTMCNC: 33.1 G/DL (ref 31–37)
MCV RBC AUTO: 86.1 FL (ref 80–100)
MCV RBC AUTO: 87.6 FL (ref 80–100)
MCV RBC AUTO: 87.7 FL (ref 80–100)
MCV RBC AUTO: 87.8 FL (ref 80–100)
MCV RBC AUTO: 87.9 FL (ref 80–100)
MCV RBC AUTO: 88.5 FL
MCV RBC AUTO: 88.6 FL (ref 80–100)
MCV RBC AUTO: 88.8 FL (ref 80–100)
MCV RBC AUTO: 88.9 FL (ref 80–100)
MCV RBC AUTO: 89 FL (ref 80–100)
MCV RBC AUTO: 89.3 FL (ref 80–100)
MCV RBC AUTO: 90.5 FL
MCV RBC AUTO: 94.1 FL (ref 82.6–102.9)
MONOCYTES # BLD: 10 % (ref 1–7)
MONOCYTES # BLD: 10 % (ref 1–7)
MONOCYTES # BLD: 10 % (ref 2–11)
MONOCYTES # BLD: 10 % (ref 2–11)
MONOCYTES # BLD: 11 % (ref 2–11)
MONOCYTES # BLD: 12 % (ref 2–11)
MONOCYTES # BLD: 14 % (ref 3–12)
MONOCYTES # BLD: 15 % (ref 2–11)
MONOCYTES # BLD: 17 % (ref 2–11)
MONOCYTES # BLD: 7 % (ref 1–7)
MONOCYTES # BLD: 8 % (ref 2–11)
MONOCYTES ABSOLUTE: 0.7 /ΜL
MONOCYTES ABSOLUTE: ABNORMAL
MONOCYTES RELATIVE PERCENT: 15.2 %
MONOCYTES RELATIVE PERCENT: ABNORMAL
MORPHOLOGY: NORMAL
MUCUS: ABNORMAL
MUCUS: ABNORMAL
NEUTROPHILS ABSOLUTE: 2.9 /ΜL
NEUTROPHILS ABSOLUTE: ABNORMAL
NEUTROPHILS RELATIVE PERCENT: 1 %
NEUTROPHILS RELATIVE PERCENT: ABNORMAL
NITRITE, URINE: NEGATIVE
NITRITE, URINE: NEGATIVE
NRBC AUTOMATED: 0 PER 100 WBC
NRBC AUTOMATED: ABNORMAL PER 100 WBC
NUCLEATED RED BLOOD CELLS: 1 PER 100 WBC
OTHER OBSERVATIONS UA: ABNORMAL
OTHER OBSERVATIONS UA: ABNORMAL
PARTIAL THROMBOPLASTIN TIME: 27.7 SEC (ref 21.3–31.3)
PDW BLD-RTO: 13.2 % (ref 11.8–14.4)
PDW BLD-RTO: 14.2 % (ref 12.5–15.4)
PDW BLD-RTO: 14.4 % (ref 12.5–15.4)
PDW BLD-RTO: 14.7 % (ref 12.5–15.4)
PDW BLD-RTO: 14.7 % (ref 12.5–15.4)
PDW BLD-RTO: 14.8 % (ref 12.5–15.4)
PDW BLD-RTO: 14.8 % (ref 12.5–15.4)
PDW BLD-RTO: 15.6 %
PDW BLD-RTO: 15.9 % (ref 12.5–15.4)
PDW BLD-RTO: 16 % (ref 12.5–15.4)
PDW BLD-RTO: 16.1 % (ref 12.5–15.4)
PDW BLD-RTO: 16.2 % (ref 12.5–15.4)
PDW BLD-RTO: 17.7 %
PH UA: 5.5 (ref 5–8)
PH UA: 6 (ref 5–8)
PLATELET # BLD: 101 K/UL (ref 140–450)
PLATELET # BLD: 178 K/UL (ref 138–453)
PLATELET # BLD: 196 K/UL (ref 140–450)
PLATELET # BLD: 197 K/UL (ref 140–450)
PLATELET # BLD: 204 K/UL (ref 140–450)
PLATELET # BLD: 219 K/ΜL
PLATELET # BLD: 241 K/UL (ref 140–450)
PLATELET # BLD: 259 K/ΜL
PLATELET # BLD: 283 K/UL (ref 140–450)
PLATELET # BLD: 285 K/UL (ref 140–450)
PLATELET # BLD: 313 K/UL (ref 140–450)
PLATELET # BLD: 80 K/UL (ref 140–450)
PLATELET # BLD: 99 K/UL (ref 140–450)
PLATELET ESTIMATE: ABNORMAL
PMV BLD AUTO: 10.7 FL (ref 8.1–13.5)
PMV BLD AUTO: 6.9 FL (ref 6–12)
PMV BLD AUTO: 7.4 FL (ref 6–12)
PMV BLD AUTO: 7.5 FL
PMV BLD AUTO: 7.7 FL (ref 6–12)
PMV BLD AUTO: 7.7 FL (ref 6–12)
PMV BLD AUTO: 7.8 FL
PMV BLD AUTO: 8.4 FL (ref 6–12)
PMV BLD AUTO: 8.5 FL (ref 6–12)
PMV BLD AUTO: 8.6 FL (ref 6–12)
PMV BLD AUTO: 8.8 FL (ref 6–12)
PMV BLD AUTO: 9 FL (ref 6–12)
PMV BLD AUTO: 9.1 FL (ref 6–12)
POTASSIUM SERPL-SCNC: 3.2 MMOL/L (ref 3.7–5.3)
POTASSIUM SERPL-SCNC: 3.3 MMOL/L (ref 3.7–5.3)
POTASSIUM SERPL-SCNC: 3.4 MMOL/L (ref 3.7–5.3)
POTASSIUM SERPL-SCNC: 3.6 MMOL/L (ref 3.7–5.3)
POTASSIUM SERPL-SCNC: 3.7 MMOL/L (ref 3.7–5.3)
POTASSIUM SERPL-SCNC: 3.8 MMOL/L
POTASSIUM SERPL-SCNC: 3.8 MMOL/L (ref 3.7–5.3)
POTASSIUM SERPL-SCNC: 3.9 MMOL/L (ref 3.7–5.3)
POTASSIUM SERPL-SCNC: 4 MMOL/L (ref 3.7–5.3)
POTASSIUM SERPL-SCNC: 4.1 MMOL/L (ref 3.7–5.3)
POTASSIUM SERPL-SCNC: 4.2 MMOL/L
POTASSIUM SERPL-SCNC: 4.2 MMOL/L (ref 3.7–5.3)
POTASSIUM SERPL-SCNC: 4.2 MMOL/L (ref 3.7–5.3)
POTASSIUM SERPL-SCNC: 4.5 MMOL/L (ref 3.7–5.3)
POTASSIUM SERPL-SCNC: 4.6 MMOL/L
PRO-BNP: 1171 PG/ML
PRO-BNP: 250 PG/ML
PROTEIN UA: ABNORMAL
PROTEIN UA: ABNORMAL
PROTHROMBIN TIME: 12.1 SEC (ref 9.4–12.6)
RBC # BLD: 2.81 10^6/ΜL
RBC # BLD: 2.82 M/UL (ref 4.5–5.9)
RBC # BLD: 2.87 10^6/ΜL
RBC # BLD: 3.01 M/UL (ref 4.5–5.9)
RBC # BLD: 3.14 M/UL (ref 4.5–5.9)
RBC # BLD: 3.23 M/UL (ref 4.5–5.9)
RBC # BLD: 3.37 M/UL (ref 4.5–5.9)
RBC # BLD: 3.39 M/UL (ref 4.5–5.9)
RBC # BLD: 4.04 M/UL (ref 4.21–5.77)
RBC # BLD: 4.15 M/UL (ref 4.5–5.9)
RBC # BLD: 4.22 M/UL (ref 4.5–5.9)
RBC # BLD: 4.27 M/UL (ref 4.5–5.9)
RBC # BLD: 4.54 M/UL (ref 4.5–5.9)
RBC # BLD: ABNORMAL 10*6/UL
RBC UA: ABNORMAL /HPF (ref 0–2)
RBC UA: ABNORMAL /HPF (ref 0–2)
RENAL EPITHELIAL, UA: ABNORMAL /HPF
RENAL EPITHELIAL, UA: ABNORMAL /HPF
SARS-COV-2, RAPID: NOT DETECTED
SARS-COV-2, RAPID: NOT DETECTED
SEG NEUTROPHILS: 56 % (ref 36–66)
SEG NEUTROPHILS: 57 % (ref 36–66)
SEG NEUTROPHILS: 66 % (ref 36–65)
SEG NEUTROPHILS: 72 % (ref 36–66)
SEG NEUTROPHILS: 73 % (ref 36–66)
SEG NEUTROPHILS: 75 % (ref 36–66)
SEG NEUTROPHILS: 75 % (ref 36–66)
SEG NEUTROPHILS: 76 % (ref 36–66)
SEG NEUTROPHILS: 78 % (ref 36–66)
SEG NEUTROPHILS: 78 % (ref 36–66)
SEG NEUTROPHILS: 84 % (ref 36–66)
SEGMENTED NEUTROPHILS ABSOLUTE COUNT: 1.8 K/UL (ref 1.8–7.7)
SEGMENTED NEUTROPHILS ABSOLUTE COUNT: 2.4 K/UL (ref 1.8–7.7)
SEGMENTED NEUTROPHILS ABSOLUTE COUNT: 2.41 K/UL (ref 1.8–7.7)
SEGMENTED NEUTROPHILS ABSOLUTE COUNT: 2.81 K/UL (ref 1.8–7.7)
SEGMENTED NEUTROPHILS ABSOLUTE COUNT: 3.4 K/UL (ref 1.8–7.7)
SEGMENTED NEUTROPHILS ABSOLUTE COUNT: 3.4 K/UL (ref 1.8–7.7)
SEGMENTED NEUTROPHILS ABSOLUTE COUNT: 3.7 K/UL (ref 1.8–7.7)
SEGMENTED NEUTROPHILS ABSOLUTE COUNT: 3.99 K/UL (ref 1.5–8.1)
SEGMENTED NEUTROPHILS ABSOLUTE COUNT: 4.5 K/UL (ref 1.8–7.7)
SEGMENTED NEUTROPHILS ABSOLUTE COUNT: 4.8 K/UL (ref 1.8–7.7)
SEGMENTED NEUTROPHILS ABSOLUTE COUNT: 4.87 K/UL (ref 1.8–7.7)
SODIUM BLD-SCNC: 131 MMOL/L (ref 135–144)
SODIUM BLD-SCNC: 132 MMOL/L (ref 135–144)
SODIUM BLD-SCNC: 134 MMOL/L (ref 135–144)
SODIUM BLD-SCNC: 134 MMOL/L (ref 135–144)
SODIUM BLD-SCNC: 135 MMOL/L (ref 135–144)
SODIUM BLD-SCNC: 137 MMOL/L (ref 135–144)
SODIUM BLD-SCNC: 137 MMOL/L (ref 135–144)
SODIUM BLD-SCNC: 138 MMOL/L (ref 135–144)
SODIUM BLD-SCNC: 141 MMOL/L (ref 135–144)
SODIUM BLD-SCNC: 141 MMOL/L (ref 135–144)
SODIUM BLD-SCNC: 142 MMOL/L
SODIUM BLD-SCNC: 143 MMOL/L
SODIUM BLD-SCNC: 143 MMOL/L
SPECIFIC GRAVITY UA: 1.02 (ref 1–1.03)
SPECIFIC GRAVITY UA: 1.02 (ref 1–1.03)
SPECIMEN DESCRIPTION: ABNORMAL
SPECIMEN DESCRIPTION: NORMAL
TOTAL IRON BINDING CAPACITY: 190 UG/DL (ref 250–450)
TOTAL PROTEIN: 4.5
TOTAL PROTEIN: 5.1 G/DL (ref 6.4–8.3)
TOTAL PROTEIN: 5.3 G/DL (ref 6.4–8.3)
TOTAL PROTEIN: 5.6 G/DL (ref 6.4–8.3)
TOTAL PROTEIN: 6.2 G/DL (ref 6.4–8.3)
TOTAL PROTEIN: 6.2 G/DL (ref 6.4–8.3)
TOTAL PROTEIN: 6.3 G/DL (ref 6.4–8.3)
TOTAL PROTEIN: 6.4 G/DL (ref 6.4–8.3)
TOTAL PROTEIN: NORMAL
TRICHOMONAS: ABNORMAL
TRICHOMONAS: ABNORMAL
TROPONIN INTERP: ABNORMAL
TROPONIN T: ABNORMAL NG/ML
TROPONIN, HIGH SENSITIVITY: 45 NG/L (ref 0–22)
TROPONIN, HIGH SENSITIVITY: 51 NG/L (ref 0–22)
TROPONIN, HIGH SENSITIVITY: 69 NG/L (ref 0–22)
TSH SERPL DL<=0.05 MIU/L-ACNC: 0.18 MIU/L (ref 0.3–5)
TURBIDITY: CLEAR
TURBIDITY: CLEAR
UNSATURATED IRON BINDING CAPACITY: 156 UG/DL (ref 112–347)
URINE HGB: ABNORMAL
URINE HGB: NEGATIVE
UROBILINOGEN, URINE: ABNORMAL
UROBILINOGEN, URINE: NORMAL
VITAMIN B-12: 271 PG/ML (ref 232–1245)
WBC # BLD: 3.1 K/UL (ref 3.5–11)
WBC # BLD: 3.2 K/UL (ref 3.5–11)
WBC # BLD: 3.6 K/UL (ref 3.5–11)
WBC # BLD: 4.1 K/UL (ref 3.5–11)
WBC # BLD: 4.4 K/UL (ref 3.5–11)
WBC # BLD: 4.5 10^3/ML
WBC # BLD: 4.8 K/UL (ref 3.5–11)
WBC # BLD: 4.9 K/UL (ref 3.5–11)
WBC # BLD: 5.3 10^3/ML
WBC # BLD: 5.8 K/UL (ref 3.5–11)
WBC # BLD: 6 K/UL (ref 3.5–11)
WBC # BLD: 6 K/UL (ref 3.5–11.3)
WBC # BLD: 6.3 K/UL (ref 3.5–11)
WBC # BLD: ABNORMAL 10*3/UL
WBC UA: ABNORMAL /HPF (ref 0–5)
WBC UA: ABNORMAL /HPF (ref 0–5)
YEAST: ABNORMAL
YEAST: ABNORMAL

## 2021-01-01 PROCEDURE — 96361 HYDRATE IV INFUSION ADD-ON: CPT

## 2021-01-01 PROCEDURE — 6360000002 HC RX W HCPCS: Performed by: EMERGENCY MEDICINE

## 2021-01-01 PROCEDURE — 36415 COLL VENOUS BLD VENIPUNCTURE: CPT

## 2021-01-01 PROCEDURE — 74177 CT ABD & PELVIS W/CONTRAST: CPT

## 2021-01-01 PROCEDURE — 4040F PNEUMOC VAC/ADMIN/RCVD: CPT | Performed by: NURSE PRACTITIONER

## 2021-01-01 PROCEDURE — 80053 COMPREHEN METABOLIC PANEL: CPT

## 2021-01-01 PROCEDURE — G8420 CALC BMI NORM PARAMETERS: HCPCS | Performed by: NURSE PRACTITIONER

## 2021-01-01 PROCEDURE — 1111F DSCHRG MED/CURRENT MED MERGE: CPT | Performed by: NURSE PRACTITIONER

## 2021-01-01 PROCEDURE — 2580000003 HC RX 258: Performed by: FAMILY MEDICINE

## 2021-01-01 PROCEDURE — 99222 1ST HOSP IP/OBS MODERATE 55: CPT | Performed by: FAMILY MEDICINE

## 2021-01-01 PROCEDURE — 97116 GAIT TRAINING THERAPY: CPT

## 2021-01-01 PROCEDURE — 80048 BASIC METABOLIC PNL TOTAL CA: CPT

## 2021-01-01 PROCEDURE — 87635 SARS-COV-2 COVID-19 AMP PRB: CPT

## 2021-01-01 PROCEDURE — 2709999900 HC NON-CHARGEABLE SUPPLY

## 2021-01-01 PROCEDURE — G8484 FLU IMMUNIZE NO ADMIN: HCPCS | Performed by: INTERNAL MEDICINE

## 2021-01-01 PROCEDURE — 6360000002 HC RX W HCPCS: Performed by: FAMILY MEDICINE

## 2021-01-01 PROCEDURE — G8420 CALC BMI NORM PARAMETERS: HCPCS | Performed by: INTERNAL MEDICINE

## 2021-01-01 PROCEDURE — 2580000003 HC RX 258: Performed by: PHYSICIAN ASSISTANT

## 2021-01-01 PROCEDURE — 2580000003 HC RX 258: Performed by: EMERGENCY MEDICINE

## 2021-01-01 PROCEDURE — C9113 INJ PANTOPRAZOLE SODIUM, VIA: HCPCS | Performed by: FAMILY MEDICINE

## 2021-01-01 PROCEDURE — 71045 X-RAY EXAM CHEST 1 VIEW: CPT

## 2021-01-01 PROCEDURE — 1123F ACP DISCUSS/DSCN MKR DOCD: CPT | Performed by: NURSE PRACTITIONER

## 2021-01-01 PROCEDURE — 93005 ELECTROCARDIOGRAM TRACING: CPT | Performed by: EMERGENCY MEDICINE

## 2021-01-01 PROCEDURE — 1036F TOBACCO NON-USER: CPT | Performed by: INTERNAL MEDICINE

## 2021-01-01 PROCEDURE — 4040F PNEUMOC VAC/ADMIN/RCVD: CPT | Performed by: INTERNAL MEDICINE

## 2021-01-01 PROCEDURE — 6370000000 HC RX 637 (ALT 250 FOR IP): Performed by: FAMILY MEDICINE

## 2021-01-01 PROCEDURE — 94761 N-INVAS EAR/PLS OXIMETRY MLT: CPT

## 2021-01-01 PROCEDURE — 1210000000 HC MED SURG R&B

## 2021-01-01 PROCEDURE — 99212 OFFICE O/P EST SF 10 MIN: CPT

## 2021-01-01 PROCEDURE — 72125 CT NECK SPINE W/O DYE: CPT

## 2021-01-01 PROCEDURE — G8418 CALC BMI BLW LOW PARAM F/U: HCPCS | Performed by: NURSE PRACTITIONER

## 2021-01-01 PROCEDURE — G8427 DOCREV CUR MEDS BY ELIG CLIN: HCPCS | Performed by: NURSE PRACTITIONER

## 2021-01-01 PROCEDURE — 99285 EMERGENCY DEPT VISIT HI MDM: CPT

## 2021-01-01 PROCEDURE — 99211 OFF/OP EST MAY X REQ PHY/QHP: CPT | Performed by: INTERNAL MEDICINE

## 2021-01-01 PROCEDURE — G8427 DOCREV CUR MEDS BY ELIG CLIN: HCPCS | Performed by: INTERNAL MEDICINE

## 2021-01-01 PROCEDURE — 1123F ACP DISCUSS/DSCN MKR DOCD: CPT | Performed by: INTERNAL MEDICINE

## 2021-01-01 PROCEDURE — 70450 CT HEAD/BRAIN W/O DYE: CPT

## 2021-01-01 PROCEDURE — 83880 ASSAY OF NATRIURETIC PEPTIDE: CPT

## 2021-01-01 PROCEDURE — 2700000000 HC OXYGEN THERAPY PER DAY

## 2021-01-01 PROCEDURE — 84484 ASSAY OF TROPONIN QUANT: CPT

## 2021-01-01 PROCEDURE — 82330 ASSAY OF CALCIUM: CPT

## 2021-01-01 PROCEDURE — 97535 SELF CARE MNGMENT TRAINING: CPT

## 2021-01-01 PROCEDURE — 99215 OFFICE O/P EST HI 40 MIN: CPT | Performed by: INTERNAL MEDICINE

## 2021-01-01 PROCEDURE — 1111F DSCHRG MED/CURRENT MED MERGE: CPT | Performed by: INTERNAL MEDICINE

## 2021-01-01 PROCEDURE — 99214 OFFICE O/P EST MOD 30 MIN: CPT | Performed by: NURSE PRACTITIONER

## 2021-01-01 PROCEDURE — 90471 IMMUNIZATION ADMIN: CPT | Performed by: EMERGENCY MEDICINE

## 2021-01-01 PROCEDURE — 94640 AIRWAY INHALATION TREATMENT: CPT

## 2021-01-01 PROCEDURE — G0008 ADMIN INFLUENZA VIRUS VAC: HCPCS | Performed by: NURSE PRACTITIONER

## 2021-01-01 PROCEDURE — 6360000004 HC RX CONTRAST MEDICATION: Performed by: INTERNAL MEDICINE

## 2021-01-01 PROCEDURE — 83540 ASSAY OF IRON: CPT

## 2021-01-01 PROCEDURE — 2060000000 HC ICU INTERMEDIATE R&B

## 2021-01-01 PROCEDURE — 81001 URINALYSIS AUTO W/SCOPE: CPT

## 2021-01-01 PROCEDURE — G8484 FLU IMMUNIZE NO ADMIN: HCPCS | Performed by: NURSE PRACTITIONER

## 2021-01-01 PROCEDURE — 85025 COMPLETE CBC W/AUTO DIFF WBC: CPT

## 2021-01-01 PROCEDURE — 6360000002 HC RX W HCPCS: Performed by: PHYSICIAN ASSISTANT

## 2021-01-01 PROCEDURE — 1036F TOBACCO NON-USER: CPT | Performed by: NURSE PRACTITIONER

## 2021-01-01 PROCEDURE — 2580000003 HC RX 258: Performed by: SPECIALIST

## 2021-01-01 PROCEDURE — 90715 TDAP VACCINE 7 YRS/> IM: CPT | Performed by: EMERGENCY MEDICINE

## 2021-01-01 PROCEDURE — 99213 OFFICE O/P EST LOW 20 MIN: CPT | Performed by: NURSE PRACTITIONER

## 2021-01-01 PROCEDURE — 90694 VACC AIIV4 NO PRSRV 0.5ML IM: CPT | Performed by: NURSE PRACTITIONER

## 2021-01-01 PROCEDURE — 83550 IRON BINDING TEST: CPT

## 2021-01-01 PROCEDURE — 94760 N-INVAS EAR/PLS OXIMETRY 1: CPT

## 2021-01-01 PROCEDURE — 85610 PROTHROMBIN TIME: CPT

## 2021-01-01 PROCEDURE — 99239 HOSP IP/OBS DSCHRG MGMT >30: CPT | Performed by: FAMILY MEDICINE

## 2021-01-01 PROCEDURE — 96367 TX/PROPH/DG ADDL SEQ IV INF: CPT

## 2021-01-01 PROCEDURE — 96360 HYDRATION IV INFUSION INIT: CPT

## 2021-01-01 PROCEDURE — 2580000003 HC RX 258: Performed by: INTERNAL MEDICINE

## 2021-01-01 PROCEDURE — 99232 SBSQ HOSP IP/OBS MODERATE 35: CPT | Performed by: INTERNAL MEDICINE

## 2021-01-01 PROCEDURE — 94664 DEMO&/EVAL PT USE INHALER: CPT

## 2021-01-01 PROCEDURE — G0378 HOSPITAL OBSERVATION PER HR: HCPCS

## 2021-01-01 PROCEDURE — 84443 ASSAY THYROID STIM HORMONE: CPT

## 2021-01-01 PROCEDURE — 99443 PR PHYS/QHP TELEPHONE EVALUATION 21-30 MIN: CPT | Performed by: INTERNAL MEDICINE

## 2021-01-01 PROCEDURE — 93005 ELECTROCARDIOGRAM TRACING: CPT | Performed by: FAMILY MEDICINE

## 2021-01-01 PROCEDURE — 99214 OFFICE O/P EST MOD 30 MIN: CPT | Performed by: INTERNAL MEDICINE

## 2021-01-01 PROCEDURE — 87086 URINE CULTURE/COLONY COUNT: CPT

## 2021-01-01 PROCEDURE — 96374 THER/PROPH/DIAG INJ IV PUSH: CPT

## 2021-01-01 PROCEDURE — 99223 1ST HOSP IP/OBS HIGH 75: CPT | Performed by: INTERNAL MEDICINE

## 2021-01-01 PROCEDURE — 80076 HEPATIC FUNCTION PANEL: CPT

## 2021-01-01 PROCEDURE — 6360000002 HC RX W HCPCS: Performed by: SPECIALIST

## 2021-01-01 PROCEDURE — 99231 SBSQ HOSP IP/OBS SF/LOW 25: CPT | Performed by: FAMILY MEDICINE

## 2021-01-01 PROCEDURE — 97162 PT EVAL MOD COMPLEX 30 MIN: CPT

## 2021-01-01 PROCEDURE — 96365 THER/PROPH/DIAG IV INF INIT: CPT

## 2021-01-01 PROCEDURE — 83690 ASSAY OF LIPASE: CPT

## 2021-01-01 PROCEDURE — 2500000003 HC RX 250 WO HCPCS: Performed by: FAMILY MEDICINE

## 2021-01-01 PROCEDURE — 83605 ASSAY OF LACTIC ACID: CPT

## 2021-01-01 PROCEDURE — 93005 ELECTROCARDIOGRAM TRACING: CPT | Performed by: PHYSICIAN ASSISTANT

## 2021-01-01 PROCEDURE — 97110 THERAPEUTIC EXERCISES: CPT

## 2021-01-01 PROCEDURE — 82746 ASSAY OF FOLIC ACID SERUM: CPT

## 2021-01-01 PROCEDURE — 97166 OT EVAL MOD COMPLEX 45 MIN: CPT

## 2021-01-01 PROCEDURE — 32552 REMOVE LUNG CATHETER: CPT

## 2021-01-01 PROCEDURE — 85730 THROMBOPLASTIN TIME PARTIAL: CPT

## 2021-01-01 PROCEDURE — 74018 RADEX ABDOMEN 1 VIEW: CPT

## 2021-01-01 PROCEDURE — 74022 RADEX COMPL AQT ABD SERIES: CPT

## 2021-01-01 PROCEDURE — 82607 VITAMIN B-12: CPT

## 2021-01-01 PROCEDURE — 6360000002 HC RX W HCPCS: Performed by: INTERNAL MEDICINE

## 2021-01-01 PROCEDURE — 94667 MNPJ CHEST WALL 1ST: CPT

## 2021-01-01 PROCEDURE — 82728 ASSAY OF FERRITIN: CPT

## 2021-01-01 PROCEDURE — 87040 BLOOD CULTURE FOR BACTERIA: CPT

## 2021-01-01 PROCEDURE — 94668 MNPJ CHEST WALL SBSQ: CPT

## 2021-01-01 PROCEDURE — G0439 PPPS, SUBSEQ VISIT: HCPCS | Performed by: NURSE PRACTITIONER

## 2021-01-01 PROCEDURE — 6370000000 HC RX 637 (ALT 250 FOR IP): Performed by: NURSE PRACTITIONER

## 2021-01-01 RX ORDER — LEVOTHYROXINE SODIUM 0.1 MG/1
TABLET ORAL
Qty: 90 TABLET | Refills: 2 | Status: ON HOLD | OUTPATIENT
Start: 2021-01-01 | End: 2021-01-01 | Stop reason: HOSPADM

## 2021-01-01 RX ORDER — TRAMETINIB 2 MG/1
2 TABLET, FILM COATED ORAL DAILY
Qty: 30 TABLET | Refills: 2 | Status: SHIPPED
Start: 2021-01-01 | End: 2021-01-01

## 2021-01-01 RX ORDER — DIPHENHYDRAMINE HYDROCHLORIDE 50 MG/ML
50 INJECTION INTRAMUSCULAR; INTRAVENOUS ONCE
Status: CANCELLED | OUTPATIENT
Start: 2021-01-01 | End: 2021-01-01

## 2021-01-01 RX ORDER — LORATADINE 10 MG/1
10 TABLET ORAL DAILY
Qty: 30 TABLET | Refills: 2 | Status: SHIPPED | OUTPATIENT
Start: 2021-01-01 | End: 2021-01-01

## 2021-01-01 RX ORDER — LEVOTHYROXINE SODIUM 0.05 MG/1
100 TABLET ORAL DAILY
Status: DISCONTINUED | OUTPATIENT
Start: 2021-01-01 | End: 2021-01-01 | Stop reason: HOSPADM

## 2021-01-01 RX ORDER — SODIUM CHLORIDE 9 MG/ML
INJECTION, SOLUTION INTRAVENOUS CONTINUOUS
Status: CANCELLED | OUTPATIENT
Start: 2021-01-01

## 2021-01-01 RX ORDER — 0.9 % SODIUM CHLORIDE 0.9 %
500 INTRAVENOUS SOLUTION INTRAVENOUS ONCE
Status: COMPLETED | OUTPATIENT
Start: 2021-01-01 | End: 2021-01-01

## 2021-01-01 RX ORDER — TRAMETINIB 2 MG/1
2 TABLET, FILM COATED ORAL DAILY
Qty: 30 TABLET | Refills: 2 | Status: SHIPPED | OUTPATIENT
Start: 2021-01-01 | End: 2021-01-01 | Stop reason: SDUPTHER

## 2021-01-01 RX ORDER — 0.9 % SODIUM CHLORIDE 0.9 %
80 INTRAVENOUS SOLUTION INTRAVENOUS ONCE
Status: COMPLETED | OUTPATIENT
Start: 2021-01-01 | End: 2021-01-01

## 2021-01-01 RX ORDER — PANTOPRAZOLE SODIUM 40 MG/10ML
40 INJECTION, POWDER, LYOPHILIZED, FOR SOLUTION INTRAVENOUS DAILY
Status: DISCONTINUED | OUTPATIENT
Start: 2021-01-01 | End: 2021-01-01 | Stop reason: HOSPADM

## 2021-01-01 RX ORDER — ATORVASTATIN CALCIUM 10 MG/1
20 TABLET, FILM COATED ORAL DAILY
Refills: 2 | Status: DISCONTINUED | OUTPATIENT
Start: 2021-01-01 | End: 2021-01-01

## 2021-01-01 RX ORDER — METHYLPREDNISOLONE SODIUM SUCCINATE 125 MG/2ML
125 INJECTION, POWDER, LYOPHILIZED, FOR SOLUTION INTRAMUSCULAR; INTRAVENOUS ONCE
Status: CANCELLED | OUTPATIENT
Start: 2021-01-01 | End: 2021-01-01

## 2021-01-01 RX ORDER — LEVOTHYROXINE SODIUM 88 UG/1
88 TABLET ORAL DAILY
Qty: 30 TABLET | Refills: 3 | Status: SHIPPED | OUTPATIENT
Start: 2021-01-01 | End: 2021-01-01

## 2021-01-01 RX ORDER — MELOXICAM 15 MG/1
15 TABLET ORAL DAILY
Qty: 30 TABLET | Refills: 3
Start: 2021-01-01 | End: 2021-01-01 | Stop reason: SDUPTHER

## 2021-01-01 RX ORDER — MELOXICAM 15 MG/1
15 TABLET ORAL DAILY
Qty: 30 TABLET | Refills: 3 | Status: SHIPPED | OUTPATIENT
Start: 2021-01-01 | End: 2021-01-01

## 2021-01-01 RX ORDER — ACETAMINOPHEN 325 MG/1
650 TABLET ORAL EVERY 4 HOURS PRN
Status: DISCONTINUED | OUTPATIENT
Start: 2021-01-01 | End: 2021-01-01 | Stop reason: HOSPADM

## 2021-01-01 RX ORDER — TRAMETINIB 0.5 MG/1
1 TABLET, FILM COATED ORAL DAILY
Qty: 60 TABLET | Refills: 1 | Status: SHIPPED | OUTPATIENT
Start: 2021-01-01

## 2021-01-01 RX ORDER — ONDANSETRON 2 MG/ML
4 INJECTION INTRAMUSCULAR; INTRAVENOUS EVERY 6 HOURS PRN
Status: DISCONTINUED | OUTPATIENT
Start: 2021-01-01 | End: 2021-01-01 | Stop reason: HOSPADM

## 2021-01-01 RX ORDER — ZOLPIDEM TARTRATE 5 MG/1
5 TABLET ORAL NIGHTLY PRN
Status: DISCONTINUED | OUTPATIENT
Start: 2021-01-01 | End: 2021-01-01 | Stop reason: HOSPADM

## 2021-01-01 RX ORDER — FUROSEMIDE 20 MG/1
20 TABLET ORAL DAILY
Qty: 30 TABLET | Refills: 1 | Status: SHIPPED | OUTPATIENT
Start: 2021-01-01 | End: 2021-01-01 | Stop reason: ALTCHOICE

## 2021-01-01 RX ORDER — SODIUM CHLORIDE 0.9 % (FLUSH) 0.9 %
10 SYRINGE (ML) INJECTION PRN
Status: CANCELLED | OUTPATIENT
Start: 2021-01-01

## 2021-01-01 RX ORDER — ALBUTEROL SULFATE 2.5 MG/3ML
2.5 SOLUTION RESPIRATORY (INHALATION) ONCE
Status: COMPLETED | OUTPATIENT
Start: 2021-01-01 | End: 2021-01-01

## 2021-01-01 RX ORDER — SODIUM CHLORIDE 0.9 % (FLUSH) 0.9 %
5 SYRINGE (ML) INJECTION PRN
Status: CANCELLED | OUTPATIENT
Start: 2021-01-01

## 2021-01-01 RX ORDER — EPINEPHRINE 1 MG/ML
0.3 INJECTION, SOLUTION, CONCENTRATE INTRAVENOUS PRN
Status: CANCELLED | OUTPATIENT
Start: 2021-01-01

## 2021-01-01 RX ORDER — VANCOMYCIN HYDROCHLORIDE 1 G/200ML
1000 INJECTION, SOLUTION INTRAVENOUS EVERY 24 HOURS
Status: DISCONTINUED | OUTPATIENT
Start: 2021-01-01 | End: 2021-01-01 | Stop reason: SDUPTHER

## 2021-01-01 RX ORDER — TRAMETINIB 0.5 MG/1
1 TABLET, FILM COATED ORAL DAILY
Qty: 60 TABLET | Refills: 1 | Status: SHIPPED
Start: 2021-01-01 | End: 2021-01-01

## 2021-01-01 RX ORDER — ACETAMINOPHEN 325 MG/1
650 TABLET ORAL EVERY 6 HOURS PRN
Status: DISCONTINUED | OUTPATIENT
Start: 2021-01-01 | End: 2021-01-01 | Stop reason: HOSPADM

## 2021-01-01 RX ORDER — HYDROXYZINE HYDROCHLORIDE 25 MG/1
25 TABLET, FILM COATED ORAL EVERY 4 HOURS PRN
Status: DISCONTINUED | OUTPATIENT
Start: 2021-01-01 | End: 2021-01-01 | Stop reason: HOSPADM

## 2021-01-01 RX ORDER — CALCIUM GLUCONATE 20 MG/ML
1000 INJECTION, SOLUTION INTRAVENOUS ONCE
Status: COMPLETED | OUTPATIENT
Start: 2021-01-01 | End: 2021-01-01

## 2021-01-01 RX ORDER — 0.9 % SODIUM CHLORIDE 0.9 %
1000 INTRAVENOUS SOLUTION INTRAVENOUS ONCE
Status: COMPLETED | OUTPATIENT
Start: 2021-01-01 | End: 2021-01-01

## 2021-01-01 RX ORDER — TRAMADOL HYDROCHLORIDE 50 MG/1
50 TABLET ORAL 2 TIMES DAILY
Qty: 60 TABLET | Refills: 0 | Status: SHIPPED | OUTPATIENT
Start: 2021-01-01 | End: 2021-01-01

## 2021-01-01 RX ORDER — SODIUM CHLORIDE 9 MG/ML
INJECTION, SOLUTION INTRAVENOUS CONTINUOUS
Status: DISCONTINUED | OUTPATIENT
Start: 2021-01-01 | End: 2021-01-01

## 2021-01-01 RX ORDER — CETIRIZINE HYDROCHLORIDE 10 MG/1
10 TABLET ORAL NIGHTLY
Status: DISCONTINUED | OUTPATIENT
Start: 2021-01-01 | End: 2021-01-01 | Stop reason: HOSPADM

## 2021-01-01 RX ORDER — SODIUM CHLORIDE 0.9 % (FLUSH) 0.9 %
10 SYRINGE (ML) INJECTION PRN
Status: DISCONTINUED | OUTPATIENT
Start: 2021-01-01 | End: 2021-01-01 | Stop reason: HOSPADM

## 2021-01-01 RX ORDER — CEPHALEXIN 500 MG/1
500 CAPSULE ORAL 2 TIMES DAILY
Qty: 14 CAPSULE | Refills: 0 | Status: SHIPPED | OUTPATIENT
Start: 2021-01-01 | End: 2021-01-01

## 2021-01-01 RX ORDER — SODIUM CHLORIDE 9 MG/ML
25 INJECTION, SOLUTION INTRAVENOUS PRN
Status: CANCELLED | OUTPATIENT
Start: 2021-01-01

## 2021-01-01 RX ORDER — SODIUM CHLORIDE 0.9 % (FLUSH) 0.9 %
5-40 SYRINGE (ML) INJECTION PRN
Status: CANCELLED | OUTPATIENT
Start: 2021-01-01

## 2021-01-01 RX ORDER — LEVOTHYROXINE SODIUM 88 UG/1
88 TABLET ORAL DAILY
Qty: 30 TABLET | Refills: 3 | Status: SHIPPED | OUTPATIENT
Start: 2021-01-01

## 2021-01-01 RX ORDER — HEPARIN SODIUM (PORCINE) LOCK FLUSH IV SOLN 100 UNIT/ML 100 UNIT/ML
500 SOLUTION INTRAVENOUS PRN
Status: CANCELLED | OUTPATIENT
Start: 2021-01-01

## 2021-01-01 RX ORDER — LORAZEPAM 2 MG/ML
0.5 INJECTION INTRAMUSCULAR EVERY 6 HOURS PRN
Status: DISCONTINUED | OUTPATIENT
Start: 2021-01-01 | End: 2021-01-01 | Stop reason: HOSPADM

## 2021-01-01 RX ORDER — ONDANSETRON 4 MG/1
4 TABLET, FILM COATED ORAL 3 TIMES DAILY PRN
Qty: 90 TABLET | Refills: 0 | Status: SHIPPED | OUTPATIENT
Start: 2021-01-01

## 2021-01-01 RX ORDER — SODIUM CHLORIDE 9 MG/ML
25 INJECTION, SOLUTION INTRAVENOUS PRN
Status: DISCONTINUED | OUTPATIENT
Start: 2021-01-01 | End: 2021-01-01 | Stop reason: HOSPADM

## 2021-01-01 RX ORDER — AZITHROMYCIN 250 MG/1
TABLET, FILM COATED ORAL
Qty: 1 PACKET | Refills: 0 | Status: SHIPPED | OUTPATIENT
Start: 2021-01-01 | End: 2021-01-01 | Stop reason: ALTCHOICE

## 2021-01-01 RX ORDER — POLYETHYLENE GLYCOL 3350 17 G/17G
17 POWDER, FOR SOLUTION ORAL DAILY PRN
Qty: 510 G | Refills: 11 | Status: SHIPPED | OUTPATIENT
Start: 2021-01-01

## 2021-01-01 RX ORDER — LEVOTHYROXINE SODIUM 88 UG/1
88 TABLET ORAL DAILY
Status: DISCONTINUED | OUTPATIENT
Start: 2021-01-01 | End: 2021-01-01 | Stop reason: HOSPADM

## 2021-01-01 RX ORDER — TRAMETINIB 0.5 MG/1
1 TABLET, FILM COATED ORAL DAILY
Qty: 60 TABLET | Refills: 1
Start: 2021-01-01 | End: 2021-01-01 | Stop reason: SDUPTHER

## 2021-01-01 RX ORDER — TRAMETINIB 2 MG/1
2 TABLET, FILM COATED ORAL
COMMUNITY
End: 2021-01-01

## 2021-01-01 RX ORDER — ACETAMINOPHEN 500 MG
1000 TABLET ORAL 2 TIMES DAILY
Qty: 1 TABLET | Refills: 0
Start: 2021-01-01

## 2021-01-01 RX ORDER — ACETAMINOPHEN, GUAIFENESIN, AND PHENYLEPHRINE HYDROCHLORIDE 650; 400; 10 MG/20ML; MG/20ML; MG/20ML
20 SOLUTION ORAL NIGHTLY
Qty: 1 ML | Refills: 0
Start: 2021-01-01 | End: 2021-01-01 | Stop reason: ALTCHOICE

## 2021-01-01 RX ORDER — LORATADINE 10 MG/1
10 TABLET ORAL DAILY
Qty: 30 TABLET | Refills: 2 | Status: SHIPPED | OUTPATIENT
Start: 2021-01-01

## 2021-01-01 RX ORDER — VANCOMYCIN HYDROCHLORIDE 1 G/200ML
1000 INJECTION, SOLUTION INTRAVENOUS EVERY 24 HOURS
Status: DISCONTINUED | OUTPATIENT
Start: 2021-01-01 | End: 2021-01-01 | Stop reason: CLARIF

## 2021-01-01 RX ORDER — FUROSEMIDE 20 MG/1
20 TABLET ORAL DAILY
Qty: 1 TABLET | Refills: 0
Start: 2021-01-01 | End: 2021-01-01

## 2021-01-01 RX ORDER — CETIRIZINE HYDROCHLORIDE 10 MG/1
10 TABLET ORAL DAILY
Refills: 2 | Status: DISCONTINUED | OUTPATIENT
Start: 2021-01-01 | End: 2021-01-01

## 2021-01-01 RX ORDER — ONDANSETRON 4 MG/1
4 TABLET, FILM COATED ORAL 3 TIMES DAILY PRN
Status: DISCONTINUED | OUTPATIENT
Start: 2021-01-01 | End: 2021-01-01 | Stop reason: HOSPADM

## 2021-01-01 RX ORDER — ALBUTEROL SULFATE 2.5 MG/3ML
2.5 SOLUTION RESPIRATORY (INHALATION) 3 TIMES DAILY
Status: DISCONTINUED | OUTPATIENT
Start: 2021-01-01 | End: 2021-01-01 | Stop reason: HOSPADM

## 2021-01-01 RX ORDER — NYSTATIN 100000 [USP'U]/G
POWDER TOPICAL
Qty: 60 G | Refills: 3 | Status: ON HOLD | OUTPATIENT
Start: 2021-01-01 | End: 2021-01-01 | Stop reason: HOSPADM

## 2021-01-01 RX ORDER — TRAMETINIB 0.5 MG/1
1 TABLET, FILM COATED ORAL DAILY
Qty: 60 TABLET | Refills: 1 | Status: SHIPPED | OUTPATIENT
Start: 2021-01-01 | End: 2021-01-01 | Stop reason: SDUPTHER

## 2021-01-01 RX ORDER — SODIUM CHLORIDE 0.9 % (FLUSH) 0.9 %
10 SYRINGE (ML) INJECTION EVERY 12 HOURS SCHEDULED
Status: DISCONTINUED | OUTPATIENT
Start: 2021-01-01 | End: 2021-01-01 | Stop reason: HOSPADM

## 2021-01-01 RX ORDER — DRONABINOL 2.5 MG/1
2.5 CAPSULE ORAL
Qty: 60 CAPSULE | Refills: 0 | Status: SHIPPED | OUTPATIENT
Start: 2021-01-01 | End: 2021-01-01

## 2021-01-01 RX ORDER — ACETAMINOPHEN 325 MG/1
650 TABLET ORAL EVERY 4 HOURS PRN
Qty: 120 TABLET | Refills: 3 | COMMUNITY
Start: 2021-01-01 | End: 2021-01-01 | Stop reason: SDUPTHER

## 2021-01-01 RX ORDER — POTASSIUM CHLORIDE 7.45 MG/ML
10 INJECTION INTRAVENOUS PRN
Status: DISCONTINUED | OUTPATIENT
Start: 2021-01-01 | End: 2021-01-01 | Stop reason: HOSPADM

## 2021-01-01 RX ORDER — CEPHALEXIN 500 MG/1
500 CAPSULE ORAL 3 TIMES DAILY
Qty: 30 CAPSULE | Refills: 0 | Status: SHIPPED | OUTPATIENT
Start: 2021-01-01 | End: 2021-01-01 | Stop reason: ALTCHOICE

## 2021-01-01 RX ORDER — MELOXICAM 15 MG/1
15 TABLET ORAL DAILY
Qty: 30 TABLET | Refills: 3 | Status: SHIPPED | OUTPATIENT
Start: 2021-01-01

## 2021-01-01 RX ORDER — HYDROXYZINE HYDROCHLORIDE 25 MG/1
25 TABLET, FILM COATED ORAL EVERY 4 HOURS PRN
Qty: 60 TABLET | Refills: 1 | Status: SHIPPED | OUTPATIENT
Start: 2021-01-01 | End: 2021-01-01 | Stop reason: ALTCHOICE

## 2021-01-01 RX ORDER — LEVOTHYROXINE SODIUM 0.05 MG/1
100 TABLET ORAL DAILY
Status: DISCONTINUED | OUTPATIENT
Start: 2021-01-01 | End: 2021-01-01

## 2021-01-01 RX ORDER — SULFAMETHOXAZOLE AND TRIMETHOPRIM 800; 160 MG/1; MG/1
1 TABLET ORAL 2 TIMES DAILY
Qty: 28 TABLET | Refills: 0 | Status: SHIPPED | OUTPATIENT
Start: 2021-01-01 | End: 2021-01-01

## 2021-01-01 RX ORDER — HEPARIN SODIUM 5000 [USP'U]/ML
5000 INJECTION, SOLUTION INTRAVENOUS; SUBCUTANEOUS EVERY 8 HOURS SCHEDULED
Status: DISCONTINUED | OUTPATIENT
Start: 2021-01-01 | End: 2021-01-01

## 2021-01-01 RX ORDER — ATORVASTATIN CALCIUM 10 MG/1
20 TABLET, FILM COATED ORAL NIGHTLY
Status: DISCONTINUED | OUTPATIENT
Start: 2021-01-01 | End: 2021-01-01 | Stop reason: HOSPADM

## 2021-01-01 RX ORDER — POTASSIUM CHLORIDE 20 MEQ/1
40 TABLET, EXTENDED RELEASE ORAL PRN
Status: DISCONTINUED | OUTPATIENT
Start: 2021-01-01 | End: 2021-01-01 | Stop reason: HOSPADM

## 2021-01-01 RX ORDER — SODIUM CHLORIDE 0.9 % (FLUSH) 0.9 %
5-40 SYRINGE (ML) INJECTION EVERY 12 HOURS SCHEDULED
Status: DISCONTINUED | OUTPATIENT
Start: 2021-01-01 | End: 2021-01-01 | Stop reason: HOSPADM

## 2021-01-01 RX ORDER — TRAMADOL HYDROCHLORIDE 50 MG/1
50 TABLET ORAL 2 TIMES DAILY
Qty: 60 TABLET | Refills: 0 | Status: SHIPPED | OUTPATIENT
Start: 2021-01-01 | End: 2021-01-01 | Stop reason: SDUPTHER

## 2021-01-01 RX ORDER — LORAZEPAM 2 MG/ML
0.5 INJECTION INTRAMUSCULAR ONCE
Status: COMPLETED | OUTPATIENT
Start: 2021-01-01 | End: 2021-01-01

## 2021-01-01 RX ORDER — TRAMADOL HYDROCHLORIDE 50 MG/1
TABLET ORAL
Qty: 60 TABLET | Refills: 0 | Status: SHIPPED | OUTPATIENT
Start: 2021-01-01 | End: 2021-11-17

## 2021-01-01 RX ORDER — SODIUM CHLORIDE 9 MG/ML
INJECTION, SOLUTION INTRAVENOUS CONTINUOUS
Status: DISCONTINUED | OUTPATIENT
Start: 2021-01-01 | End: 2021-01-01 | Stop reason: HOSPADM

## 2021-01-01 RX ORDER — SODIUM CHLORIDE 9 MG/ML
10 INJECTION INTRAVENOUS DAILY
Status: DISCONTINUED | OUTPATIENT
Start: 2021-01-01 | End: 2021-01-01 | Stop reason: HOSPADM

## 2021-01-01 RX ORDER — FLUCONAZOLE 150 MG/1
150 TABLET ORAL ONCE
Qty: 1 TABLET | Refills: 1 | Status: SHIPPED | OUTPATIENT
Start: 2021-01-01 | End: 2021-01-01

## 2021-01-01 RX ADMIN — IOHEXOL 50 ML: 240 INJECTION, SOLUTION INTRATHECAL; INTRAVASCULAR; INTRAVENOUS; ORAL at 11:46

## 2021-01-01 RX ADMIN — SODIUM CHLORIDE, PRESERVATIVE FREE 10 ML: 5 INJECTION INTRAVENOUS at 14:11

## 2021-01-01 RX ADMIN — PIPERACILLIN AND TAZOBACTAM 3375 MG: 3; .375 INJECTION, POWDER, LYOPHILIZED, FOR SOLUTION INTRAVENOUS at 10:00

## 2021-01-01 RX ADMIN — IOHEXOL 50 ML: 240 INJECTION, SOLUTION INTRATHECAL; INTRAVASCULAR; INTRAVENOUS; ORAL at 13:56

## 2021-01-01 RX ADMIN — PANTOPRAZOLE SODIUM 40 MG: 40 INJECTION, POWDER, FOR SOLUTION INTRAVENOUS at 09:00

## 2021-01-01 RX ADMIN — ALBUTEROL SULFATE 2.5 MG: 2.5 SOLUTION RESPIRATORY (INHALATION) at 17:57

## 2021-01-01 RX ADMIN — IOHEXOL 50 ML: 240 INJECTION, SOLUTION INTRATHECAL; INTRAVASCULAR; INTRAVENOUS; ORAL at 13:34

## 2021-01-01 RX ADMIN — SODIUM CHLORIDE, PRESERVATIVE FREE 10 ML: 5 INJECTION INTRAVENOUS at 11:46

## 2021-01-01 RX ADMIN — PANTOPRAZOLE SODIUM 40 MG: 40 INJECTION, POWDER, FOR SOLUTION INTRAVENOUS at 08:52

## 2021-01-01 RX ADMIN — CETIRIZINE HYDROCHLORIDE 10 MG: 10 TABLET, FILM COATED ORAL at 20:20

## 2021-01-01 RX ADMIN — SODIUM CHLORIDE 1000 ML: 9 INJECTION, SOLUTION INTRAVENOUS at 13:56

## 2021-01-01 RX ADMIN — ZOLEDRONIC ACID 4 MG: 0.8 INJECTION, SOLUTION, CONCENTRATE INTRAVENOUS at 13:57

## 2021-01-01 RX ADMIN — SODIUM CHLORIDE: 9 INJECTION, SOLUTION INTRAVENOUS at 00:43

## 2021-01-01 RX ADMIN — PIPERACILLIN AND TAZOBACTAM 3375 MG: 3; .375 INJECTION, POWDER, LYOPHILIZED, FOR SOLUTION INTRAVENOUS at 10:09

## 2021-01-01 RX ADMIN — LEVOTHYROXINE SODIUM 88 MCG: 88 TABLET ORAL at 10:13

## 2021-01-01 RX ADMIN — PIPERACILLIN AND TAZOBACTAM 3375 MG: 3; .375 INJECTION, POWDER, LYOPHILIZED, FOR SOLUTION INTRAVENOUS at 01:32

## 2021-01-01 RX ADMIN — PIPERACILLIN AND TAZOBACTAM 3375 MG: 3; .375 INJECTION, POWDER, LYOPHILIZED, FOR SOLUTION INTRAVENOUS at 18:03

## 2021-01-01 RX ADMIN — SODIUM CHLORIDE 80 ML: 9 INJECTION, SOLUTION INTRAVENOUS at 13:56

## 2021-01-01 RX ADMIN — PIPERACILLIN AND TAZOBACTAM 3375 MG: 3; .375 INJECTION, POWDER, LYOPHILIZED, FOR SOLUTION INTRAVENOUS at 02:10

## 2021-01-01 RX ADMIN — ATORVASTATIN CALCIUM 20 MG: 10 TABLET, FILM COATED ORAL at 20:20

## 2021-01-01 RX ADMIN — TETANUS TOXOID, REDUCED DIPHTHERIA TOXOID AND ACELLULAR PERTUSSIS VACCINE, ADSORBED 0.5 ML: 5; 2.5; 8; 8; 2.5 SUSPENSION INTRAMUSCULAR at 00:33

## 2021-01-01 RX ADMIN — IOPAMIDOL 75 ML: 755 INJECTION, SOLUTION INTRAVENOUS at 11:46

## 2021-01-01 RX ADMIN — ENOXAPARIN SODIUM 40 MG: 40 INJECTION SUBCUTANEOUS at 10:12

## 2021-01-01 RX ADMIN — PIPERACILLIN AND TAZOBACTAM 4500 MG: 4; .5 INJECTION, POWDER, LYOPHILIZED, FOR SOLUTION INTRAVENOUS; PARENTERAL at 15:06

## 2021-01-01 RX ADMIN — ENOXAPARIN SODIUM 40 MG: 40 INJECTION SUBCUTANEOUS at 23:13

## 2021-01-01 RX ADMIN — PIPERACILLIN AND TAZOBACTAM 3375 MG: 3; .375 INJECTION, POWDER, LYOPHILIZED, FOR SOLUTION INTRAVENOUS at 02:00

## 2021-01-01 RX ADMIN — ENOXAPARIN SODIUM 40 MG: 40 INJECTION SUBCUTANEOUS at 08:52

## 2021-01-01 RX ADMIN — SODIUM CHLORIDE 500 ML: 9 INJECTION, SOLUTION INTRAVENOUS at 12:40

## 2021-01-01 RX ADMIN — Medication 10 ML: at 13:34

## 2021-01-01 RX ADMIN — SODIUM CHLORIDE 1000 ML: 9 INJECTION, SOLUTION INTRAVENOUS at 00:38

## 2021-01-01 RX ADMIN — SODIUM CHLORIDE, PRESERVATIVE FREE 10 ML: 5 INJECTION INTRAVENOUS at 08:53

## 2021-01-01 RX ADMIN — ENOXAPARIN SODIUM 40 MG: 40 INJECTION SUBCUTANEOUS at 11:29

## 2021-01-01 RX ADMIN — SODIUM CHLORIDE: 9 INJECTION, SOLUTION INTRAVENOUS at 01:27

## 2021-01-01 RX ADMIN — SODIUM CHLORIDE 500 ML: 9 INJECTION, SOLUTION INTRAVENOUS at 17:36

## 2021-01-01 RX ADMIN — IOPAMIDOL 75 ML: 755 INJECTION, SOLUTION INTRAVENOUS at 13:34

## 2021-01-01 RX ADMIN — SODIUM CHLORIDE, PRESERVATIVE FREE 10 ML: 5 INJECTION INTRAVENOUS at 20:03

## 2021-01-01 RX ADMIN — ENOXAPARIN SODIUM 40 MG: 40 INJECTION SUBCUTANEOUS at 08:06

## 2021-01-01 RX ADMIN — VANCOMYCIN HYDROCHLORIDE 1000 MG: 1 INJECTION, POWDER, LYOPHILIZED, FOR SOLUTION INTRAVENOUS at 15:34

## 2021-01-01 RX ADMIN — PIPERACILLIN AND TAZOBACTAM 3375 MG: 3; .375 INJECTION, POWDER, LYOPHILIZED, FOR SOLUTION INTRAVENOUS at 17:49

## 2021-01-01 RX ADMIN — ALBUTEROL SULFATE 2.5 MG: 2.5 SOLUTION RESPIRATORY (INHALATION) at 08:28

## 2021-01-01 RX ADMIN — CASIRIVIMAB AND IMDEVIMAB: 600; 600 INJECTION, SOLUTION, CONCENTRATE INTRAVENOUS at 12:23

## 2021-01-01 RX ADMIN — IOPAMIDOL 100 ML: 755 INJECTION, SOLUTION INTRAVENOUS at 13:56

## 2021-01-01 RX ADMIN — CALCIUM GLUCONATE 1000 MG: 20 INJECTION, SOLUTION INTRAVENOUS at 10:12

## 2021-01-01 RX ADMIN — SODIUM CHLORIDE, PRESERVATIVE FREE 10 ML: 5 INJECTION INTRAVENOUS at 23:15

## 2021-01-01 RX ADMIN — LEVOTHYROXINE SODIUM 100 MCG: 50 TABLET ORAL at 09:01

## 2021-01-01 RX ADMIN — ALBUTEROL SULFATE 2.5 MG: 2.5 SOLUTION RESPIRATORY (INHALATION) at 19:34

## 2021-01-01 RX ADMIN — SODIUM CHLORIDE 80 ML: 9 INJECTION, SOLUTION INTRAVENOUS at 13:34

## 2021-01-01 RX ADMIN — PANTOPRAZOLE SODIUM 40 MG: 40 INJECTION, POWDER, FOR SOLUTION INTRAVENOUS at 19:01

## 2021-01-01 RX ADMIN — ENOXAPARIN SODIUM 40 MG: 40 INJECTION SUBCUTANEOUS at 09:00

## 2021-01-01 RX ADMIN — LORAZEPAM 0.5 MG: 2 INJECTION INTRAMUSCULAR; INTRAVENOUS at 17:00

## 2021-01-01 RX ADMIN — POTASSIUM CHLORIDE 40 MEQ: 1500 TABLET, EXTENDED RELEASE ORAL at 10:55

## 2021-01-01 RX ADMIN — SODIUM CHLORIDE, PRESERVATIVE FREE 10 ML: 5 INJECTION INTRAVENOUS at 19:01

## 2021-01-01 RX ADMIN — SODIUM CHLORIDE: 9 INJECTION, SOLUTION INTRAVENOUS at 13:00

## 2021-01-01 RX ADMIN — PIPERACILLIN AND TAZOBACTAM 3375 MG: 3; .375 INJECTION, POWDER, LYOPHILIZED, FOR SOLUTION INTRAVENOUS at 10:43

## 2021-01-01 RX ADMIN — PANTOPRAZOLE SODIUM 40 MG: 40 INJECTION, POWDER, FOR SOLUTION INTRAVENOUS at 08:07

## 2021-01-01 RX ADMIN — SODIUM CHLORIDE 80 ML: 9 INJECTION, SOLUTION INTRAVENOUS at 11:46

## 2021-01-01 RX ADMIN — SODIUM CHLORIDE, PRESERVATIVE FREE 10 ML: 5 INJECTION INTRAVENOUS at 08:59

## 2021-01-01 RX ADMIN — POTASSIUM CHLORIDE 40 MEQ: 1500 TABLET, EXTENDED RELEASE ORAL at 08:06

## 2021-01-01 RX ADMIN — SODIUM CHLORIDE, PRESERVATIVE FREE 10 ML: 5 INJECTION INTRAVENOUS at 10:13

## 2021-01-01 RX ADMIN — SODIUM CHLORIDE: 9 INJECTION, SOLUTION INTRAVENOUS at 16:44

## 2021-01-01 RX ADMIN — SODIUM CHLORIDE, PRESERVATIVE FREE 10 ML: 5 INJECTION INTRAVENOUS at 08:07

## 2021-01-01 RX ADMIN — VANCOMYCIN HYDROCHLORIDE 1000 MG: 1 INJECTION, POWDER, LYOPHILIZED, FOR SOLUTION INTRAVENOUS at 18:11

## 2021-01-01 RX ADMIN — SODIUM CHLORIDE, PRESERVATIVE FREE 10 ML: 5 INJECTION INTRAVENOUS at 09:00

## 2021-01-01 RX ADMIN — VANCOMYCIN HYDROCHLORIDE 1000 MG: 1 INJECTION, POWDER, LYOPHILIZED, FOR SOLUTION INTRAVENOUS at 17:50

## 2021-01-01 RX ADMIN — LEVOTHYROXINE SODIUM 100 MCG: 50 TABLET ORAL at 08:52

## 2021-01-01 RX ADMIN — ALBUTEROL SULFATE 2.5 MG: 2.5 SOLUTION RESPIRATORY (INHALATION) at 15:00

## 2021-01-01 RX ADMIN — SODIUM CHLORIDE, PRESERVATIVE FREE 10 ML: 5 INJECTION INTRAVENOUS at 11:30

## 2021-01-01 RX ADMIN — ENOXAPARIN SODIUM 40 MG: 40 INJECTION SUBCUTANEOUS at 21:42

## 2021-01-01 RX ADMIN — LEVOTHYROXINE SODIUM 100 MCG: 50 TABLET ORAL at 11:30

## 2021-01-01 RX ADMIN — LEVOTHYROXINE SODIUM 100 MCG: 50 TABLET ORAL at 08:06

## 2021-01-01 SDOH — ECONOMIC STABILITY: FOOD INSECURITY: WITHIN THE PAST 12 MONTHS, YOU WORRIED THAT YOUR FOOD WOULD RUN OUT BEFORE YOU GOT MONEY TO BUY MORE.: PATIENT DECLINED

## 2021-01-01 ASSESSMENT — SOCIAL DETERMINANTS OF HEALTH (SDOH): HOW HARD IS IT FOR YOU TO PAY FOR THE VERY BASICS LIKE FOOD, HOUSING, MEDICAL CARE, AND HEATING?: PATIENT DECLINED

## 2021-01-01 ASSESSMENT — SLEEP AND FATIGUE QUESTIONNAIRES
HOW LIKELY ARE YOU TO NOD OFF OR FALL ASLEEP WHILE SITTING AND READING: 3
HOW LIKELY ARE YOU TO NOD OFF OR FALL ASLEEP WHILE SITTING AND TALKING TO SOMEONE: 0
HOW LIKELY ARE YOU TO NOD OFF OR FALL ASLEEP IN A CAR, WHILE STOPPED FOR A FEW MINUTES IN TRAFFIC: 0
HOW LIKELY ARE YOU TO NOD OFF OR FALL ASLEEP WHILE SITTING QUIETLY AFTER LUNCH WITHOUT ALCOHOL: 2
HOW LIKELY ARE YOU TO NOD OFF OR FALL ASLEEP WHILE SITTING AND READING: 3
HOW LIKELY ARE YOU TO NOD OFF OR FALL ASLEEP WHILE WATCHING TV: 3
HOW LIKELY ARE YOU TO NOD OFF OR FALL ASLEEP WHEN YOU ARE A PASSENGER IN A CAR FOR AN HOUR WITHOUT A BREAK: 3
ESS TOTAL SCORE: 16
HOW LIKELY ARE YOU TO NOD OFF OR FALL ASLEEP WHILE LYING DOWN TO REST IN THE AFTERNOON WHEN CIRCUMSTANCES PERMIT: 1

## 2021-01-01 ASSESSMENT — ENCOUNTER SYMPTOMS
SINUS PAIN: 0
DIARRHEA: 0
CONSTIPATION: 0
BACK PAIN: 0
NAUSEA: 0
SINUS PAIN: 0
EYE REDNESS: 0
SINUS PRESSURE: 0
BACK PAIN: 0
NAUSEA: 0
DIARRHEA: 0
RHINORRHEA: 0
COLOR CHANGE: 0
EYE PAIN: 0
COUGH: 0
VOMITING: 0
SINUS PAIN: 0
SHORTNESS OF BREATH: 1
BACK PAIN: 0
SORE THROAT: 0
COUGH: 0
WHEEZING: 0
CONSTIPATION: 0
COUGH: 1
SINUS PAIN: 0
DIARRHEA: 1
CONSTIPATION: 1
EYE REDNESS: 0
SHORTNESS OF BREATH: 1
EYE DISCHARGE: 0
SINUS PRESSURE: 0
SHORTNESS OF BREATH: 0
CONSTIPATION: 0
RHINORRHEA: 0
COUGH: 0
EYE REDNESS: 0
NAUSEA: 0
DIARRHEA: 0
SHORTNESS OF BREATH: 1
DIARRHEA: 0
DIARRHEA: 0
BACK PAIN: 1
SHORTNESS OF BREATH: 0
BACK PAIN: 0
NAUSEA: 0
SHORTNESS OF BREATH: 1
CONSTIPATION: 1
EYE REDNESS: 0
COUGH: 1
ROS SKIN COMMENTS: DRY
DIARRHEA: 0
EYE PAIN: 0
NAUSEA: 1
DIARRHEA: 0
NAUSEA: 0
ABDOMINAL PAIN: 0
EYE REDNESS: 0
PHOTOPHOBIA: 0
SINUS PRESSURE: 0
CONSTIPATION: 1
SHORTNESS OF BREATH: 1
SORE THROAT: 1
COUGH: 1
CONSTIPATION: 1
EYE REDNESS: 0
SHORTNESS OF BREATH: 0
EYE PAIN: 0
BACK PAIN: 1
DIARRHEA: 0
EYE PAIN: 0
SHORTNESS OF BREATH: 1
COUGH: 0
NAUSEA: 0
CONSTIPATION: 0
COUGH: 0
SINUS PAIN: 0
COLOR CHANGE: 1
SHORTNESS OF BREATH: 0
NAUSEA: 1
DIARRHEA: 0
EYE PAIN: 0
NAUSEA: 1
DIARRHEA: 0
CONSTIPATION: 0
COUGH: 0
SORE THROAT: 0
CONSTIPATION: 1
COUGH: 1

## 2021-01-01 ASSESSMENT — PAIN DESCRIPTION - PAIN TYPE
TYPE: ACUTE PAIN
TYPE: ACUTE PAIN

## 2021-01-01 ASSESSMENT — PAIN SCALES - GENERAL
PAINLEVEL_OUTOF10: 0
PAINLEVEL_OUTOF10: 5
PAINLEVEL_OUTOF10: 0

## 2021-01-01 ASSESSMENT — PAIN - FUNCTIONAL ASSESSMENT
PAIN_FUNCTIONAL_ASSESSMENT: PREVENTS OR INTERFERES SOME ACTIVE ACTIVITIES AND ADLS
PAIN_FUNCTIONAL_ASSESSMENT: PREVENTS OR INTERFERES SOME ACTIVE ACTIVITIES AND ADLS

## 2021-01-01 ASSESSMENT — PATIENT HEALTH QUESTIONNAIRE - PHQ9
SUM OF ALL RESPONSES TO PHQ9 QUESTIONS 1 & 2: 1
SUM OF ALL RESPONSES TO PHQ QUESTIONS 1-9: 1
DEPRESSION UNABLE TO ASSESS: PT REFUSES
2. FEELING DOWN, DEPRESSED OR HOPELESS: 1

## 2021-01-01 ASSESSMENT — PAIN DESCRIPTION - ORIENTATION: ORIENTATION: RIGHT

## 2021-01-01 ASSESSMENT — PAIN DESCRIPTION - LOCATION
LOCATION: SHOULDER
LOCATION: SHOULDER

## 2021-01-01 ASSESSMENT — PAIN DESCRIPTION - DESCRIPTORS: DESCRIPTORS: SHARP;SHOOTING

## 2021-01-04 NOTE — TELEPHONE ENCOUNTER
Pt daughter called and wanted to know if her father could get the vaccine. Called and spoke to Dr Jeff Spaulding and he states it will be fine. Called and informed Taita that he is good to go. Verbalizes understanding.

## 2021-01-05 NOTE — TELEPHONE ENCOUNTER
Dr Josee Ridley stated he will sign rx for Marinol to help as an appetite stimulant. Writer spoke with patient and he stated he does not need/want weekly IV hydration at this time. Patient instructed to contact office with any issues. Patient communicated understanding.  Mika Emery

## 2021-01-06 NOTE — TELEPHONE ENCOUNTER
ASSISTING MISTY LEBLANC, RN NAVIGATOR. I CALLED TO CHECK IN WITH PATIENT TO SEE HOW HE IS DOING. LEFT MESSAGE AS TO WHY I CALLED AND REMINDED HIM THAT HE CAN CALL MISTY AT ANY TIME. LEFT MISTY'S NUMBER AS A CALL BACK.

## 2021-01-13 NOTE — TELEPHONE ENCOUNTER
Name: Adolfo De Souza  : 1932  MRN: [id number]    Oncology Navigation Follow-Up Note  Navigator spoke with pt. Offering assistance and pt. Reported starting appetite stimulant Monday. Pt. Denies any needs at this time, but will call navigator if needed.     Electronically signed by Mitchel Gaspar RN on 2021 at 2:20 PM

## 2021-01-13 NOTE — TELEPHONE ENCOUNTER
Patient's daughter Madaline Klinefelter called and is requesting that Dr. Karina Galo do an order for [address]  Item # [id number]    She is then asking once done I fax to [facility] at [phone]. Then call her back to inform it is done. I DID NOT do a pending order because I was not sure what to do. Thanks!

## 2021-01-15 PROBLEM — E83.52 HYPERCALCEMIA: Status: ACTIVE | Noted: 2021-01-01

## 2021-01-15 NOTE — PATIENT INSTRUCTIONS
Hydration 1 L   zometa one dose for high calcium  Hold off treatment until next visit  RTc as scheduled with scans next week

## 2021-01-15 NOTE — PROGRESS NOTES
Patient here following MD visit for hydration and Zometa. IV started. Normal saline hung per MAR over 2 hours. Sleeping on and off. Zometa hung per MAR. Infusing. No complaints. Hydration completed. IV discontinued intact, dressing to site. Has a return visit scheduled. Discharged ambulatory per self.

## 2021-01-15 NOTE — PROGRESS NOTES
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 Pleural fluid cytology positive for adenocarcinoma  Multiple masses in the right hemithorax which are pleural based and along the pericardium. ECOG performance status 2  TREATMENT HISTORY:  Next-generation testing with TEMPUS is ordered  Patient planning to have Pleurx catheter placement on 10/19  Patient not a candidate for aggressive chemotherapy therefore we will plan single agent Keytruda, regardless of PD-L1 expression results  His insurance delayed the treatment approval  11/13/20: started on single agent Keytruda  INTERVAL HISTORY[de-identified]   Patient is returning for acute visit with his daughter. He has completed 3 cycles of Keytruda. He denies any nausea vomiting or diarrhea however he has significant functional decline as reported by the daughter. Reportedly he has some episodes of confusion and significant tiredness. He denies any headache, seizure episodes. His oral intake has decreased as well. Patient has been following with palliative care. The daughter concern about the quality of life and want to discuss goals of care and hospice care options. He still has a Pleurx catheter in place and as per the daughter is draining 300-400 cc pleural fluid every 4 days and reportedly most of the time has a bloody tinge. He denies any fever chills or shortness of breath but he does have some dyspnea on exertion. During this visit patient's allergy, social, medical, surgical history and medications were reviewed and updated. HPI in Brief:   The patient is a 80 y.o.   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     Heme oncology was consulted for suspicion of metastatic disease. Unknown primary.     Patient also reported poor appetite and intermittent episodes of dry cough since 1 month. No significant weight loss reported. Significant risk factors include 13-pack-year history of smoking quit in 1969  Daily drinker drinking 2-3 drinks every day. Denied exposure to chemicals like asbestos/silica  Evaluated by pulmonology. Had IR guided thoracentesis. Surg path report showed adenocarcinoma of lung. Review of systems  General: no fever or night sweats, Weight is stable. ENT: No double or blurred vision, no tinnitus or hearing problem, no dysphagia or sore throat   Respiratory: No chest pain, no shortness of breath, no cough or hemoptysis. Cardiovascular: Denies chest pain, PND or orthopnea. No L E swelling or palpitations. Gastrointestinal:    No nausea or vomiting, abdominal pain, diarrhea or constipation. Genitourinary: Denies dysuria, hematuria, frequency, urgency or incontinence. Neurological: Denies headaches, decreased LOC, no sensory or motor focal deficits. No Known Allergies    Medications:   Reviewed in Epic   Current Outpatient Medications   Medication Sig Dispense Refill    loratadine (CLARITIN) 10 MG tablet Take 1 tablet by mouth daily 30 tablet 2    dronabinol (MARINOL) 2.5 MG capsule Take 1 capsule by mouth 2 times daily (before meals) for 30 days.  60 capsule 0    levothyroxine (SYNTHROID) 112 MCG tablet TAKE 1 TABLET BY MOUTH EVERY DAY 90 tablet 2    simvastatin (ZOCOR) 40 MG tablet TAKE 1 TABLET BY MOUTH EVERY NIGHT 90 tablet 2    Glucos-Chond-Hyal Ac-Ca Fructo (MOVE FREE JOINT HEALTH ADVANCE PO) Take 1 tablet by mouth daily      meloxicam (MOBIC) 15 MG tablet TAKE 1 TABLET BY MOUTH DAILY 30 tablet 3     No current facility-administered medications for this visit. Objective:   Vitals: BP (!) 109/50   Pulse 92   Temp 97.8 °F (36.6 °C) (Temporal)   Resp 16   Wt 159 lb 6.4 oz (72.3 kg)   BMI 23.54 kg/m²   General appearance - well appearing, on 2 L nasal cannula. Mental status - alert and cooperative   Mouth - mucous membranes moist  Neck -Nno significant adenopathy, s/p resection of thyroid  Lymphatics - no palpable lymphadenopathy. Chest -bilaterally diminished breath sounds at bases. Bilateral expiratory wheezes noticed.   Heart - normal rate, regular rhythm, normal S1, S2, no murmurs  Abdomen - soft, nontender, nondistended, no masses or organomegaly   Neurological - alert, oriented, normal speech, no focal findings   Musculoskeletal - no joint tenderness, deformity or swelling   Extremities - peripheral pulses normal, no pedal edema, no clubbing or cyanosis   Skin - normal coloration and turgor, no rashes, no suspicious skin lesions noted ,     Data:  Lab Results   Component Value Date    WBC 6.0 01/15/2021    HGB 11.8 (L) 01/15/2021    HCT 38.0 (L) 01/15/2021    MCV 94.1 01/15/2021     01/15/2021     Lab Results   Component Value Date     01/15/2021    K 4.5 01/15/2021    CL 99 01/15/2021    CO2 29 01/15/2021    BUN 17 01/15/2021    CREATININE 0.63 (L) 01/15/2021    GLUCOSE 101 (H) 01/15/2021    CALCIUM 12.6 (H) 01/15/2021    PROT 6.2 (L) 01/15/2021    LABALBU 3.5 01/15/2021    BILITOT 0.45 01/15/2021    ALKPHOS 70 01/15/2021    AST 15 01/15/2021    ALT 7 01/15/2021    LABGLOM >60 01/15/2021    GFRAA >60 01/15/2021    GLOB NOT REPORTED 04/26/2019       Surgical Pathology Report   Surgical Pathology   Collected:  10/01/20 1600    Lab status:  Final    Resulting lab:  [facility]    Value:  -- Diagnosis --   RIGHT THORACENTESIS FLUID:          METASTATIC ADENOCARCINOMA, COMPATIBLE WITH LUNG PRIMARY. IMAGING DATA:  CT chest PE   No central or segmental pulmonary embolus.         Large right pleural effusion.         Multiple masses within the right hemithorax which appear to be pleural based    and along the pericardium.  Metastatic disease is suspected      Chest x-ray 9/29/2020  Interval increase in now moderate right basilar opacity with opacification of    the inferior 1/2 of the right hemithorax, findings likely related to    increasing moderately large right pleural effusion with underlying    atelectasis/infiltrate and or mass       CT abd pelvis- 10/2/20  Impression    Scattered right pleural soft tissue nodules and mediastinal lymphadenopathy    reflecting malignancy.         Otherwise, no evidence for metastatic disease in the abdomen or pelvis. MR brain  Impression    Unremarkable MRI of brain without abnormal postcontrast enhancement. PMH:  Past Medical History:   Diagnosis Date    Arthritis     Hearing loss     Hyperlipidemia     Hypertension     Lung cancer ([address] 10/2020    stage 4, cells in pleural space.  Malignant pleural effusion     RIGHT    On supplemental oxygen therapy     Prostate cancer ([address] 1992 approx    treated surgically    Thyroid cancer ([address] 2017    treated surgically and with radiation    Thyroid disease       Allergies: No Known Allergies     Assessment    1. Non-small cell cancer, right-sided: Large right-sided pleural effusion with multiple pleural-based masses present suspicion for metastatic disease, cytology from thoracentesis showed adenocarcinoma. CT abdomen and MRI brain negative for metastasis. Currently he is receiving single agent Keytruda and completed 3 cycles so far. 2. Hypercalcemia: This could be causing his significant weakness and confusion. 3. Pericardial lymphadenopathy  4.  Large right-sided pleural effusion: Status post Pleurx catheter placement  5. Prostrate cancer 28 years ago status post resection   6. Thyroid cancer 4 years ago status post resection and radiotherapy  Plan   I reviewed the recent lab work, imaging studies, diagnosis, goals of care and also discussed treatment options with patient and family   So far he has completed 3 cycles of Keytruda. As reported by patient's daughter, there is a significant functional decline and some episodes of confusion. His lab work suggesting hypercalcemia which could cause the symptoms. I will arrange for IV hydration today and give a dose of Zometa. Discussed goals of care and possibility of hospice. I also given option to see if his symptoms gets better after correction of dehydration and hypercalcemia and reassess situation. At this point I would hold off his Keytruda until we reevaluate him in 2 to 3 weeks   I will get restaging scans prior to his next visit     Patient and family agreeable    Kev Ramos MD  Hematologist/Medical Oncologist    This note is created with the assistance of a speech recognition program.  While intending to generate a document that actually reflects the content of the visit, the document can still have some errors including those of syntax and sound a like substitutions which may escape proof reading. It such instances, actual meaning can be extrapolated by contextual diversion.

## 2021-01-15 NOTE — TELEPHONE ENCOUNTER
BRUNO HERE VIA WHEELCHAIR FOR MD VISIT  ORDERS HYDRATION 1 L  ZOMETA 1 DOSE FOR HIGH CALCIUM  RV 3 WKS W/ SCANS NEXT WEEK  CT C/A/P IS SCHEDULED ON 1/21/21 @ 1PM IN [facility] ARRIVAL @ 12PM  JULIA Miranda MD VISIT 1/26/21 @ 10:45AM IN [facility]  AVS PRINTED W/ INSTRUCTIONS

## 2021-01-26 PROBLEM — M19.90 OSTEOARTHROSIS: Status: ACTIVE | Noted: 2017-06-20

## 2021-01-26 NOTE — TELEPHONE ENCOUNTER
AVS from 1/26/21     Chemo teach  Stop [facility] Powell  Script for [employer]  RTC 4 weeks    Msg'd Vahid Murguia for [employer] d/c    Waiting for rx from Dr. Howie Dupree    RV scheduled 2/23/21 @ 3:30pm    PT was mailed AVS and an appt schedule, did not stop at check out prior to leaving    Electronically signed by Misty Trejo on 1/26/2021 at 12:45 PM

## 2021-01-26 NOTE — PROGRESS NOTES
[facility] PRIMARY CARE  [address]  [address] [address]  Dept: [phone]    Jessica Chun is a 80 y.o. male Established patient, who presents today for his medical conditions/complaintsas noted below. Chief Complaint   Patient presents with    Medication Check     6WEEK       HPI:     HPI    Reviewed prior notes oncology  Reviewed previous Labs - 12/29/20 & 1/15/21    He show Dr. Jeff Spaulding, oncology, today. No spread of cancer to other organs, but amount of cancer in right lung has increased    Will stop Keytruda  He had a dose of Zometa, to help decrease Ca, - feeling better afterward   May try Tafinlar and Mekinist if approved - there are side effects    He has to force himself to eat. States he has been fighting the eating thing for a couple  Blood pressure is improved    He takes nutritional shake in the morning. He just does not want to eat. He misses dronabinol     Memory is not as good as it was 3 months ago. He forgets things. They have nurse coming 2x/day and have wellness checks and have aide coming in  Daughter is draining 300-400 fluid from port every 4-5 days. It is grossly bloody. Bowel movement every 3 days - he does feel constipated  He takes stool softer at night when constipation states    LDL Cholesterol (mg/dL)   Date Value   04/26/2019 58       (goal LDL is <100)   AST (U/L)   Date Value   01/26/2021 16     ALT (U/L)   Date Value   01/26/2021 8     BUN (mg/dL)   Date Value   01/26/2021 13     TSH (mIU/L)   Date Value   12/29/2020 0.04 (L)     BP Readings from Last 3 Encounters:   01/26/21 130/60   01/26/21 117/65   01/15/21 (!) 109/50          (goal 120/80)    Past Medical History:   Diagnosis Date    Arthritis     Hearing loss     Hyperlipidemia     Hypertension     Lung cancer ([address] 10/2020    stage 4, cells in pleural space.     Malignant pleural effusion     RIGHT    On supplemental oxygen therapy     Prostate cancer ([address] 1992 approx    treated surgically    Thyroid cancer ([facility] 2017    treated surgically and with radiation    Thyroid disease       Past Surgical History:   Procedure Laterality Date    CARPAL TUNNEL RELEASE Bilateral     IR INSERT TUNNELED PLEURA CATH W CUFF  10/19/2020    IR INSERT TUNNELED PLEURA CATH W CUFF 10/19/2020 Claudell Current, MD [facility]    MOUTH SURGERY      dental repairs    PROSTATECTOMY      THYROIDECTOMY         Family History   Problem Relation Age of Onset    Prostate Cancer Father     Diabetes Brother        Social History     Tobacco Use    Smoking status: Former Smoker     Packs/day: 1.00     Years: 20.00     Pack years: 20.00     Types: Cigarettes     Quit date:      Years since quittin.1    Smokeless tobacco: Never Used   Substance Use Topics    Alcohol use: Yes     Alcohol/week: 16.0 standard drinks     Types: 2 Glasses of wine, 14 Shots of liquor per week      Current Outpatient Medications   Medication Sig Dispense Refill    Dabrafenib Mesylate 75 MG CAPS Take 75 mg by mouth 2 times daily 60 capsule 2    Trametinib Dimethyl Sulfoxide (MEKINIST) 2 MG TABS Take 2 mg by mouth daily 30 tablet 2    levothyroxine (SYNTHROID) 100 MCG tablet TAKE 1 TABLET BY MOUTH EVERY DAY 90 tablet 2    loratadine (CLARITIN) 10 MG tablet Take 1 tablet by mouth daily 30 tablet 2    dronabinol (MARINOL) 2.5 MG capsule Take 1 capsule by mouth 2 times daily (before meals) for 30 days. 60 capsule 0    simvastatin (ZOCOR) 40 MG tablet TAKE 1 TABLET BY MOUTH EVERY NIGHT 90 tablet 2    Glucos-Chond-Hyal Ac-Ca Fructo (MOVE FREE JOINT HEALTH ADVANCE PO) Take 1 tablet by mouth daily      meloxicam (MOBIC) 15 MG tablet TAKE 1 TABLET BY MOUTH DAILY 30 tablet 3     No current facility-administered medications for this visit.       No Known Allergies    Health Maintenance   Topic Date Due    DTaP/Tdap/Td vaccine (1 - Tdap) 1951    Shingles Vaccine (2 of 2) 2019    Annual Wellness Visit (AWV)  05/29/2019    Lipid screen  04/26/2020    COVID-19 Vaccine (2 of 2 - Pfizer series) 01/29/2021    PSA counseling  10/01/2021    TSH testing  12/29/2021    Potassium monitoring  01/26/2022    Creatinine monitoring  01/26/2022    Flu vaccine  Completed    Pneumococcal 65+ years Vaccine  Completed    Hepatitis A vaccine  Aged Out    Hepatitis B vaccine  Aged Out    Hib vaccine  Aged Out    Meningococcal (ACWY) vaccine  Aged Out       Subjective:      Review of Systems   Constitutional: Positive for fatigue. Negative for chills and fever. HENT: Negative for congestion, sinus pressure and sinus pain. Eyes: Negative for photophobia, pain and redness. Respiratory: Positive for cough and shortness of breath. Cardiovascular: Negative for chest pain and leg swelling. Gastrointestinal: Positive for constipation and nausea. Negative for diarrhea. Endocrine: Negative for polydipsia, polyphagia and polyuria. Genitourinary: Negative for difficulty urinating and dysuria. Musculoskeletal: Positive for arthralgias and gait problem. Skin: Negative for rash and wound. Neurological: Positive for weakness and light-headedness. Negative for dizziness and headaches. Psychiatric/Behavioral: Positive for confusion and sleep disturbance. The patient is nervous/anxious. Objective:     /60   Pulse 83   Temp 97 °F (36.1 °C)   Wt 156 lb 9.6 oz (71 kg)   SpO2 98%   BMI 23.13 kg/m²   Physical Exam  Vitals signs and nursing note reviewed. Constitutional:       Appearance: He is ill-appearing. HENT:      Head: Normocephalic and atraumatic. Right Ear: Tympanic membrane, ear canal and external ear normal.      Left Ear: Tympanic membrane, ear canal and external ear normal.   Eyes:      Extraocular Movements: Extraocular movements intact. Pupils: Pupils are equal, round, and reactive to light. Neck:      Musculoskeletal: Normal range of motion.  Neck rigidity present. Cardiovascular:      Rate and Rhythm: Normal rate and regular rhythm. Heart sounds: Normal heart sounds. Comments: No carotid bruit  Pulmonary:      Breath sounds: Normal breath sounds. Abdominal:      General: Bowel sounds are normal.      Palpations: Abdomen is soft. Tenderness: There is abdominal tenderness in the right lower quadrant. Comments: Pleural drain tubing   Musculoskeletal:      Right lower leg: No edema. Left lower leg: No edema. Skin:     General: Skin is warm and dry. Neurological:      Mental Status: He is alert and oriented to person, place, and time. Motor: Weakness present. Psychiatric:         Mood and Affect: Mood normal.         Speech: Speech normal.         Behavior: Behavior normal.         Cognition and Memory: He exhibits impaired recent memory. Assessment:       Diagnosis Orders   1. History of malignant neoplasm of thyroid     2. Postoperative hypothyroidism  levothyroxine (SYNTHROID) 100 MCG tablet   3. Non-small cell carcinoma of right lung, stage 4 (HCC)          Plan:    Decrease levothyroxine to 100 mcg daily  Try miralax daily for constipation  Encouraged to use nutritional drinks    Return in about 7 weeks (around 3/16/2021) for AWV. No orders of the defined types were placed in this encounter. Orders Placed This Encounter   Medications    levothyroxine (SYNTHROID) 100 MCG tablet     Sig: TAKE 1 TABLET BY MOUTH EVERY DAY     Dispense:  90 tablet     Refill:  2       Patient given educationalmaterials - see patient instructions. Discussed use, benefit, and side effectsof prescribed medications. All patient questions answered. Pt voiced understanding. Reviewed health maintenance. Instructed to continue current medications, diet andexercise. Patient agreed with treatment plan. Follow up as directed.      Electronicallysigned by LO Fagan CNP on 1/26/2021 at 4:34 PM

## 2021-01-26 NOTE — PROGRESS NOTES
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 Pleural fluid cytology positive for adenocarcinoma  Multiple masses in the right hemithorax which are pleural based and along the pericardium. ECOG performance status 2  TREATMENT HISTORY:  Next-generation testing with TEMPUS is ordered  Patient planning to have Pleurx catheter placement on 10/19  Patient not a candidate for aggressive chemotherapy therefore we will plan single agent Keytruda, regardless of PD-L1 expression results  His insurance delayed the treatment approval  11/13/20: started on single agent Keytruda  INTERVAL HISTORY[de-identified]   Patient is returning for up visit and to discuss the results of recent restaging scans and further recommendations. Unfortunately his scan showed progression of disease. His daughter is reporting that he is extremely tired and wants to focus on quality of life. Given the progression on immunotherapy I discussed second line option for him. He has BRAF V6 00E mutation and therefore would be candidate for Tafinlar plus Mekinist.    He denies any headache, seizure episodes. His oral intake has decreased as well. He denies any fever chills or shortness of breath but he does have some dyspnea on exertion. During this visit patient's allergy, social, medical, surgical history and medications were reviewed and updated. HPI in Brief:   The patient is a 80 y.o.   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     [facility] oncology was consulted for suspicion of metastatic disease. Unknown primary.     Patient also reported poor appetite and intermittent episodes of dry cough since 1 month. No significant weight loss reported. Significant risk factors include 13-pack-year history of smoking quit in 1969  Daily drinker drinking 2-3 drinks every day. Denied exposure to chemicals like asbestos/silica  Evaluated by pulmonology. Had IR guided thoracentesis. Surg path report showed adenocarcinoma of lung. Review of systems  General: no fever or night sweats, Weight is stable. ENT: No double or blurred vision, no tinnitus or hearing problem, no dysphagia or sore throat   Respiratory: No chest pain, no shortness of breath, no cough or hemoptysis. Cardiovascular: Denies chest pain, PND or orthopnea. No L E swelling or palpitations. Gastrointestinal:    No nausea or vomiting, abdominal pain, diarrhea or constipation. Genitourinary: Denies dysuria, hematuria, frequency, urgency or incontinence. Neurological: Denies headaches, decreased LOC, no sensory or motor focal deficits. No Known Allergies    Medications:   Reviewed in Epic   Current Outpatient Medications   Medication Sig Dispense Refill    loratadine (CLARITIN) 10 MG tablet Take 1 tablet by mouth daily 30 tablet 2    dronabinol (MARINOL) 2.5 MG capsule Take 1 capsule by mouth 2 times daily (before meals) for 30 days.  60 capsule 0    levothyroxine (SYNTHROID) 112 MCG tablet TAKE 1 TABLET BY MOUTH EVERY DAY 90 tablet 2    simvastatin (ZOCOR) 40 MG tablet TAKE 1 TABLET BY MOUTH EVERY NIGHT 90 tablet 2    Glucos-Chond-Hyal Ac-Ca Fructo (MOVE FREE JOINT HEALTH ADVANCE PO) Take 1 tablet by mouth daily      meloxicam (MOBIC) 15 MG tablet TAKE 1 TABLET BY MOUTH DAILY 30 tablet 3     No current facility-administered medications for this visit. Objective:   Vitals: /65   Pulse 111   Temp 97.7 °F (36.5 °C) (Oral)   Wt 156 lb 11.2 oz (71.1 kg)   BMI 23.14 kg/m²   General appearance - well appearing, on 2 L nasal cannula. Mental status - alert and cooperative   Mouth - mucous membranes moist  Neck -Nno significant adenopathy, s/p resection of thyroid  Lymphatics - no palpable lymphadenopathy. Chest -bilaterally diminished breath sounds at bases. Bilateral expiratory wheezes noticed. Heart - normal rate, regular rhythm, normal S1, S2, no murmurs  Abdomen - soft, nontender, nondistended, no masses or organomegaly   Neurological - alert, oriented, normal speech, no focal findings   Musculoskeletal - no joint tenderness, deformity or swelling   Extremities - peripheral pulses normal, no pedal edema, no clubbing or cyanosis   Skin - normal coloration and turgor, no rashes, no suspicious skin lesions noted ,     Data:  Lab Results   Component Value Date    WBC 6.0 01/26/2021    HGB 12.3 (L) 01/26/2021    HCT 37.5 (L) 01/26/2021    MCV 89.0 01/26/2021     01/26/2021     Lab Results   Component Value Date     01/26/2021    K 4.0 01/26/2021     01/26/2021    CO2 23 01/26/2021    BUN 13 01/26/2021    CREATININE 0.65 (L) 01/26/2021    GLUCOSE 138 (H) 01/26/2021    CALCIUM 10.4 01/26/2021    PROT 6.3 (L) 01/26/2021    LABALBU 3.8 01/26/2021    BILITOT 0.50 01/26/2021    ALKPHOS 65 01/26/2021    AST 16 01/26/2021    ALT 8 01/26/2021    LABGLOM >60 01/26/2021    GFRAA >60 01/26/2021    GLOB NOT REPORTED 04/26/2019       Surgical Pathology Report   Surgical Pathology   Collected:  10/01/20 1600    Lab status:  Final    Resulting lab:  [email or web address]    Value:  -- Diagnosis --   RIGHT THORACENTESIS FLUID:          METASTATIC ADENOCARCINOMA, COMPATIBLE WITH LUNG PRIMARY. IMAGING DATA:  CT chest PE   No central or segmental pulmonary embolus.         Large right pleural effusion.      Multiple masses within the right hemithorax which appear to be pleural based    and along the pericardium.  Metastatic disease is suspected      Chest x-ray 9/29/2020  Interval increase in now moderate right basilar opacity with opacification of    the inferior 1/2 of the right hemithorax, findings likely related to    increasing moderately large right pleural effusion with underlying    atelectasis/infiltrate and or mass       CT abd pelvis- 10/2/20  Impression    Scattered right pleural soft tissue nodules and mediastinal lymphadenopathy    reflecting malignancy.         Otherwise, no evidence for metastatic disease in the abdomen or pelvis. MR brain  Impression    Unremarkable MRI of brain without abnormal postcontrast enhancement. 1/21/21 CT chest abd pelvis  Impression   Significant increase in the pleural and pericardial studding in the right   hemithorax with large confluent soft tissue masses with only small areas of   pleural sparing along the upper anterolateral pleural margin.       Loculated moderate right pleural effusion that remains simple fluid in   attenuation with a basilar pleural drain in place.       Linear opacities extending from the right suprahilar region to the right lung   apex with surrounding mild ground-glass and interstitial prominence are not   significantly changed relative to prior, but there is slight increase in the   soft tissue fullness about the right hilum.       No findings of metastatic disease below the diaphragm. PMH:  Past Medical History:   Diagnosis Date    Arthritis     Hearing loss     Hyperlipidemia     Hypertension     Lung cancer ([address] 10/2020    stage 4, cells in pleural space.     Malignant pleural effusion     RIGHT    On supplemental oxygen therapy     Prostate cancer ([address] 1992 approx    treated surgically    Thyroid cancer ([address] 2017    treated surgically and with radiation    Thyroid disease       Allergies: No Known Allergies Assessment    1. Non-small cell cancer, right-sided: Large right-sided pleural effusion with multiple pleural-based masses present suspicion for metastatic disease, cytology from thoracentesis showed adenocarcinoma. His cancer negative for EGFR, ALK, ROS1, RET mutations. ,  But positive for BRAF V6 100 E mutation. PD-L1 expression positive with TPS > 1%. CT abdomen and MRI brain negative for metastasis. Currently he ws started on single agent Keytruda and completed 3 cycles so far. Restaging scans showed progression. 2. Hypercalcemia: This could be causing his significant weakness and confusion. 3. Pericardial lymphadenopathy  4. Large right-sided pleural effusion: Status post Pleurx catheter placement  5. Prostrate cancer 28 years ago status post resection   6. Thyroid cancer 4 years ago status post resection and radiotherapy  Plan   I reviewed the recent lab work, imaging studies, diagnosis, goals of care and also discussed treatment options with patient and family   So far he has completed 3 cycles of Keytruda. Schedule use of recent scan unfortunately which showed progression of disease. Is next-generation sequencing with positive for BRAF V6 and a mutation therefore he would be candidate for Tafinlar plus Mekinist  I reviewed the risk and benefit and also discussed side effects with patient and family. I also discussed option of comfort care. Patient is willing to try oral targeted therapy. He is agreeable to proceed with. I will discontinue his immunotherapy. New prescription for Tafinlar plus Mekinist was given  Return to clinic in 4 weeks  Patient and family agreeable    Kev Seaman MD  Hematologist/Medical Oncologist    This note is created with the assistance of a speech recognition program.  While intending to generate a document that actually reflects the content of the visit, the document can still have some errors including those of syntax and sound a like substitutions which may escape proof reading. It such instances, actual meaning can be extrapolated by contextual diversion.

## 2021-01-27 NOTE — TELEPHONE ENCOUNTER
RX for Dabrafenib and Mekinist, last progress note, demographics, and insurance info faxed to [address] [facility] with confirmation.

## 2021-01-28 NOTE — TELEPHONE ENCOUNTER
Name: Federica Brewer  : 1932  MRN: [id number]    Oncology Navigation Follow-Up Note    Navigator reviewing chart and new oral treatment order faxed to [facility]. Discussed pt. In Multidisciplinary meeting this AM and informed dieticianEleazar pt. Starting new treatment and to reach out to pt. Plan to follow.    Electronically signed by Gigi Trejo RN on 2021 at 10:33 AM

## 2021-01-29 NOTE — TELEPHONE ENCOUNTER
Per [facility], they are not contracted to fill medications. Can be filled at [address] [place] [address] or [facility]. [place] [facility]

## 2021-02-03 NOTE — TELEPHONE ENCOUNTER
Writer called [facility] for status of Dabrafenib and Mekinist Rx's. Representative states pt only had Part B coverage with them, and these mediations are covered under Part D. They need pt's Part D information to proceed. She states they attempted to call pt Monday, but had to leave VM. Writer attempted to call pt but also left VM. Writer called pt's daughter, Maria Elena Elaine. She states pt does have Part D and she will obtain the information and call Embedded Chat. Writer gave [employer] phone number and asked her to call our piotr with any issues. Will continue to follow.

## 2021-02-03 NOTE — TELEPHONE ENCOUNTER
Name: Matthew Courtney  : 1932  MRN: [id number]    Oncology Navigation Follow-Up Note    Navigator reviewing chart and following-up on status of oral chemotherapy ordered? Noted Triage-Brittni working with daughter on part D information. Writer had spoken with Dietician-Graciela reminding her pt. Would be starting new regimen soon and pt. Needing nutritional support(losing weight).    Electronically signed by Yvette Zheng RN on 2/3/2021 at 1:44 PM

## 2021-02-05 NOTE — TELEPHONE ENCOUNTER
received referral from [employer] stating patient's daughter had concerns about level of care in current residence.  spoke with Lincoln Knapp who states patient and spouse are still in independent living but she is looking for assisted living/nursing home services.  provided contact information for [employer] and encouraged her to touch base with them on recommendations.  encouraged her to call with any questions.

## 2021-02-05 NOTE — TELEPHONE ENCOUNTER
[facility] Nutrition Follow Up       Amy Nickerson is a 80 y.o.  male     Subjective/Current Data:  Lorrain Dakin, pt's daughter re: nutrition follow up. Pt hasn't gained any weight, has lost a few pounds. Pt's daughter states she is trying various ways to get him to eat more, but nothing really \"sticks. \" Daughter states he drinks 1 Equate plus drink daily in the morning, despite encouraging more frequent consumption. Daughter gave several examples of high calorie foods and snacks that are available for patient to eat; however he doesn't just doesn't seem interested in food. In my opinion, patient and wife may require higher level of care in the future based on history and current from daughter, Anitha Gonzalez.     Nutritional Requirements:  Estimated Energy Needs:  Weight Used: 71kg   Method Used: Valery Lucero  Estimated kcal Needs: 3238-3060 kcal per day  Protein Needs:  Weight used: 71kg  1.2-1.4 g/kg =  g per day     Nutrition-focused Physical Findings  GI Symptoms: poor appetite, loss of taste and smell             Skin: no issues reported  Intake vs. Estimated Needs: likely less than needs aeb continued unintentional weight loss     Nutrition Diagnosis and Goal  Problem:  Unintentional wt loss  Etiology/related to: catabolic illness  Symptoms/Signs/as evidenced by: reported poor PO intakes, lung ca with immunotherapy, wt loss  18.5% x 6months      Goal: Adequate intake to aide in wt maintenaince, signs and symptoms management, and overall wellbeing. Progress towards goal: gradually worsening     NUTRITION RECOMMENDATIONS / MONITORING / EVALUATION  1. Continue Equate nutrition shake but use 3-4x daily with encouraged small, frequent high calorie meal/snacks  2. Continue marinol as ordered. 3. Recommend consider scheduled weekly IV hydration  4. Monitor wts, labs, PO intakes, s/s management, plan of care.   5.  Referred to [address] [place] [address], [address] [facility] [place] [facility], [address], [place]  Registered Dietitian  [facility] [address]  [phone]

## 2021-02-08 NOTE — TELEPHONE ENCOUNTER
Writer called [employer] and spoke with Abby Singh to get an update on Dabrafenib and Mekinist rx's. Abby Singh stated that the patient only has [employer] part B coverage at this time and stated that he would have a very large copay over $10,000 out of pocket for both medications. Writer read above note from [employer], triage RN, stating she spoke with daughter, Sam Bruce, and she stated patient had part D coverage as well. Sam Bruce stated that she had to enroll patient into a new [place] plan including part D coverage and stated that the part D coverage will not be active until 3/1/21. Writer informed [facility] of above to see if patient is eligible for copay assistance or any assistance for this month's rx's until part D coverage is active and approved.  Glo Bowman

## 2021-02-09 NOTE — TELEPHONE ENCOUNTER
Name: Tiffany Roper  : 1932  MRN: [id number]    Oncology Navigation Follow-Up Note  Navigator reviewing chart and noting dietician reaching out to pt and [employer] working on assistance for new oral regimen ordered. Plan to follow.    Electronically signed by Paz Nissen, RN on 2021 at 12:05 PM

## 2021-02-09 NOTE — TELEPHONE ENCOUNTER
Lori notified Alia Portillo that there is a program for both rx's and stated she will screen patient today to see if he qualifies for assistance for this month. Writer informed Eligio Pang, daughter, of above and Eligio Pang stated that she has all of patient's financial paperwork and manages all medical issues for him. All Ding communicated understanding.  Brittaney Barrera

## 2021-02-16 NOTE — TELEPHONE ENCOUNTER
Name: Toyin Tello  : 1932  MRN: [id number]    Oncology Navigation Follow-Up Note    Navigator sending message to [facility]  Checking on status of specialty meds and all financial documents submitted yesterday, wait approval.   Electronically signed by Cynthia Lenz RN on 2021 at 11:00 AM

## 2021-02-18 NOTE — TELEPHONE ENCOUNTER
Name: Adolfo De Souza  : 1932  MRN: [id number]    Oncology Navigation Follow-Up Note  Navigator reaching out to pt. And offering assistance. Pt. States, \"my appetite is bad\"Writer asking pt. What he had eaten so far today? Pt. Stated, \"I've eaten a ham and cheese sandwich with some apples and my nutritional supplement\" Navigator proud of his progress so far today and encouraging pt. To continue the good work. New medications remain pending, pt. Informed and informed him we would keep his daughter informed. Plan to follow.    Electronically signed by Mitchel Gaspar RN on 2021 at 1:38 PM

## 2021-02-23 NOTE — TELEPHONE ENCOUNTER
Name: Clifford Duckworth  : 1932  MRN: [id number]    Oncology Navigation Follow-Up Note    Navigator reviewing chart and appreciate notes per Jason Lee.    Electronically signed by Abiel Bradford RN on 2021 at 2:17 PM

## 2021-02-24 NOTE — TELEPHONE ENCOUNTER
Writer reached out to [facility]. Alberta Fire is requesting to be present the the consultation with River. Madie Lyons says that she is not at the home at this time and will be there on Friday at 11am or 1130 am, she would like the education to be initiated at that time. Madie Lyons has many concerns, regarding nutrition an hydration. Next follow up is 3/2/21 with Dr David Pena. Writer encouraged Alberta Serena to speak with Dr David Pena regarding extra hydration. Patient has no appetite and he is not eating. Patient has the mekinist however the dabrafenib has not arrived yet. Madie Lyons is not sure about the delivery. Will return call on Friday as requested.

## 2021-02-24 NOTE — TELEPHONE ENCOUNTER
Call received from Luisumesh Dilcia, daughter, stating that the patient received Mekinist, but not Talfinar. Luisumesh Dilcia also stated that the patient has not had teach for both medications. Shara to contact patient to schedule teach. [place] to contact daughter, Gin Ha, at [phone] to discuss when he will receive Talfinar.  Sara Gil

## 2021-03-02 NOTE — PROGRESS NOTES
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 Pleural fluid cytology positive for adenocarcinoma  Multiple masses in the right hemithorax which are pleural based and along the pericardium. ECOG performance status 2  TREATMENT HISTORY:  Next-generation testing with TEMPUS is ordered  Patient planning to have Pleurx catheter placement on 10/19  Patient not a candidate for aggressive chemotherapy therefore we will plan single agent Keytruda, regardless of PD-L1 expression results  His insurance delayed the treatment approval  11/13/20: started on single agent Keytruda. Unfortunately his scan in January showed progression of disease. He has BRAF V6 00E mutation. Plan to startTafinlar plus Mekinist.    INTERVAL HISTORY[de-identified]   Patient is returning for up visit and to discuss further recommendations. He reports his energy level has improved when he is off [place] treatment. His fluid drainage has decreased as well. He has received both Tafinlar and Mekinist shipments. Patient has concern about side effects from the and and I answered all his questions and concerns. I explained that if he does not want to start treatment then we would support his decision and focus on his comfort care. But it would be reasonable to try and if he has side effects he could always stop the medication. He denies any fever chills or shortness of breath but he does have some dyspnea on exertion. During this visit patient's allergy, social, medical, surgical history and medications were reviewed and updated. HPI in Brief:   The patient is a 80 y.o.   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     [facility] oncology was consulted for suspicion of metastatic disease. Unknown primary.     Patient also reported poor appetite and intermittent episodes of dry cough since 1 month. No significant weight loss reported. Significant risk factors include 13-pack-year history of smoking quit in 1969  Daily drinker drinking 2-3 drinks every day. Denied exposure to chemicals like asbestos/silica  Evaluated by pulmonology. Had IR guided thoracentesis. Surg path report showed adenocarcinoma of lung. Review of systems  General: no fever or night sweats, Weight is stable. ENT: No double or blurred vision, no tinnitus or hearing problem, no dysphagia or sore throat   Respiratory: No chest pain, no shortness of breath, no cough or hemoptysis. Cardiovascular: Denies chest pain, PND or orthopnea. No L E swelling or palpitations. Gastrointestinal:    No nausea or vomiting, abdominal pain, diarrhea or constipation. Genitourinary: Denies dysuria, hematuria, frequency, urgency or incontinence. Neurological: Denies headaches, decreased LOC, no sensory or motor focal deficits.      No Known Allergies    Medications:   Reviewed in Epic   Current Outpatient Medications   Medication Sig Dispense Refill    meloxicam (MOBIC) 15 MG tablet Take 1 tablet by mouth daily 30 tablet 3    Dabrafenib Mesylate 75 MG CAPS Take 75 mg by mouth 2 times daily 60 capsule 2    Trametinib Dimethyl Sulfoxide (MEKINIST) 2 MG TABS Take 2 mg by mouth daily 30 tablet 2    levothyroxine (SYNTHROID) 100 MCG tablet TAKE 1 TABLET BY MOUTH EVERY DAY 90 tablet 2    loratadine (CLARITIN) 10 MG tablet Take 1  METASTATIC ADENOCARCINOMA, COMPATIBLE WITH LUNG PRIMARY. IMAGING DATA:  CT chest PE   No central or segmental pulmonary embolus.         Large right pleural effusion.         Multiple masses within the right hemithorax which appear to be pleural based    and along the pericardium.  Metastatic disease is suspected      Chest x-ray 9/29/2020  Interval increase in now moderate right basilar opacity with opacification of    the inferior 1/2 of the right hemithorax, findings likely related to    increasing moderately large right pleural effusion with underlying    atelectasis/infiltrate and or mass       CT abd pelvis- 10/2/20  Impression    Scattered right pleural soft tissue nodules and mediastinal lymphadenopathy    reflecting malignancy.         Otherwise, no evidence for metastatic disease in the abdomen or pelvis. MR brain  Impression    Unremarkable MRI of brain without abnormal postcontrast enhancement. 1/21/21 CT chest abd pelvis  Impression   Significant increase in the pleural and pericardial studding in the right   hemithorax with large confluent soft tissue masses with only small areas of   pleural sparing along the upper anterolateral pleural margin.       Loculated moderate right pleural effusion that remains simple fluid in   attenuation with a basilar pleural drain in place.       Linear opacities extending from the right suprahilar region to the right lung   apex with surrounding mild ground-glass and interstitial prominence are not   significantly changed relative to prior, but there is slight increase in the   soft tissue fullness about the right hilum.       No findings of metastatic disease below the diaphragm. PMH:  Past Medical History:   Diagnosis Date    Arthritis     Hearing loss     Hyperlipidemia     Hypertension     Lung cancer ([address] 10/2020    stage 4, cells in pleural space.     Malignant pleural effusion     RIGHT    On supplemental oxygen therapy     [facility] 1992 approx    treated surgically    [facility] 2017    treated surgically and with radiation    Thyroid disease       Allergies: No Known Allergies     Assessment    1. Non-small cell cancer, right-sided: Large right-sided pleural effusion with multiple pleural-based masses present suspicion for metastatic disease, cytology from thoracentesis showed adenocarcinoma. His cancer negative for EGFR, ALK, ROS1, RET mutations. ,  But positive for BRAF V6 100 E mutation. PD-L1 expression positive with TPS > 1%. CT abdomen and MRI brain negative for metastasis. Currently he ws started on single agent Keytruda and completed 3 cycles so far. Restaging scans showed progression. 2. Hypercalcemia: This could be causing his significant weakness and confusion. 3. Pericardial lymphadenopathy  4. Large right-sided pleural effusion: Status post Pleurx catheter placement  5. Prostrate cancer 28 years ago status post resection   6. Thyroid cancer 4 years ago status post resection and radiotherapy  Plan   I reviewed the recent lab work, imaging studies, diagnosis, goals of care and also discussed treatment options with patient and family   So far he has completed 3 cycles of Keytruda. Schedule use of recent scan unfortunately which showed progression of disease. Is next-generation sequencing with positive for BRAF V6 and a mutation therefore he would be candidate for Tafinlar plus Mekinist  Given the toxicity concern and his age, I will use Tafinlar 75 mg twice daily instead of 150 mg twice daily. And if his tolerated then we will go up on the dose  I reviewed the risk and benefit and also discussed side effects with patient and family. I also discussed option of comfort care. Patient is willing to try oral targeted therapy. He will start the pills tomorrow  I will see him in 1 week for toxicity check  Patient and family agreeable    Kev Suh MD  Hematologist/Medical Oncologist    This

## 2021-03-02 NOTE — TELEPHONE ENCOUNTER
AVS from 3/2/21    RTC one week    RV scheduled 3/9/21 @ 9:15am, verbal orders for cbc cmp at RV    PT was given AVS and an appt schedule    Electronically signed by Rg Camacho on 3/2/2021 at 12:18 PM

## 2021-03-02 NOTE — TELEPHONE ENCOUNTER
Name: Romero Wilcox  : 1932  MRN: [id number]    Oncology Navigation Follow-Up Note  Navigator reviewing chart and recent visit notes, plan to follow up with pt. Later this week once he has started treatment.   Electronically signed by Raza Natarajan RN on 3/2/2021 at 3:17 PM

## 2021-03-09 NOTE — PROGRESS NOTES
Date:                           3/9/2021  Patient name:           Cornel Humphrey  MRN:   [id number]  YOB: 1932  PCP:                           LO Steven CNP      DIAGNOSIS:  Stage IV metastatic right-sided non-small cell carcinoma of the lung, adenocarcinoma histology. Pleural fluid cytology positive for adenocarcinoma  Multiple masses in the right hemithorax which are pleural based and along the pericardium. ECOG performance status 2  TREATMENT HISTORY:  Next-generation testing with TEMPUS is ordered  Patient planning to have Pleurx catheter placement on 10/19  Patient not a candidate for aggressive chemotherapy therefore we will plan single agent Keytruda, regardless of PD-L1 expression results  His insurance delayed the treatment approval  11/13/20: started on single agent Keytruda. Unfortunately his scan in January showed progression of disease. He has BRAF V6 00E mutation. Started Tafinlar plus Mekinist on 3/3/21  INTERVAL HISTORY[de-identified]   Patient is returning for up visit and to discuss further recommendations. He has tolerated. Tafinlar plus Mekinist on 3/3 and tolerated well. He denies any nausea matting, diarrhea. He reported that his fatigue in fact is better. He denies any fever chills or shortness of breath but he does have some dyspnea on exertion. During this visit patient's allergy, social, medical, surgical history and medications were reviewed and updated. HPI in Brief:   The patient is a 80 y.o.   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     Heme oncology was consulted for suspicion of metastatic disease. Unknown primary.     Patient also reported poor appetite and intermittent episodes of dry cough since 1 month. No significant weight loss reported. Significant risk factors include 13-pack-year history of smoking quit in 1969  Daily drinker drinking 2-3 drinks every day. Denied exposure to chemicals like asbestos/silica  Evaluated by pulmonology. Had IR guided thoracentesis. Surg path report showed adenocarcinoma of lung. Review of systems  General: no fever or night sweats, Weight is stable. ENT: No double or blurred vision, no tinnitus or hearing problem, no dysphagia or sore throat   Respiratory: No chest pain, no shortness of breath, no cough or hemoptysis. Cardiovascular: Denies chest pain, PND or orthopnea. No L E swelling or palpitations. Gastrointestinal:    No nausea or vomiting, abdominal pain, diarrhea or constipation. Genitourinary: Denies dysuria, hematuria, frequency, urgency or incontinence. Neurological: Denies headaches, decreased LOC, no sensory or motor focal deficits. No Known Allergies    Medications:   Reviewed in Epic   Current Outpatient Medications   Medication Sig Dispense Refill    meloxicam (MOBIC) 15 MG tablet Take 1 tablet by mouth daily 30 tablet 3    Dabrafenib Mesylate 75 MG CAPS Take 75 mg by mouth 2 times daily 60 capsule 2    Trametinib Dimethyl Sulfoxide (MEKINIST) 2 MG TABS Take 2 mg by mouth daily 30 tablet 2    levothyroxine (SYNTHROID) 100 MCG tablet TAKE 1 TABLET BY MOUTH EVERY DAY 90 tablet 2    loratadine (CLARITIN) 10 MG tablet Take 1 tablet by mouth daily 30 tablet 2    simvastatin (ZOCOR) 40 MG tablet TAKE 1 TABLET BY MOUTH EVERY NIGHT 90 tablet 2    Glucos-Chond-Hyal Ac-Ca Fructo (MOVE FREE JOINT HEALTH ADVANCE PO) Take 1 tablet by mouth daily       No current facility-administered medications for this visit.       Objective:   Vitals: /66   Pulse 99   Temp 97.9 °F (36.6 °C) (Oral)   Wt 141 lb 11.2 oz (64.3 kg)   BMI 20.93 kg/m²   General appearance - well appearing, on 2 L nasal cannula. Mental status - alert and cooperative   Mouth - mucous membranes moist  Neck -Nno significant adenopathy, s/p resection of thyroid  Lymphatics - no palpable lymphadenopathy. Chest -bilaterally diminished breath sounds at bases. Bilateral expiratory wheezes noticed. Heart - normal rate, regular rhythm, normal S1, S2, no murmurs  Abdomen - soft, nontender, nondistended, no masses or organomegaly   Neurological - alert, oriented, normal speech, no focal findings   Musculoskeletal - no joint tenderness, deformity or swelling   Extremities - peripheral pulses normal, no pedal edema, no clubbing or cyanosis   Skin - normal coloration and turgor, no rashes, no suspicious skin lesions noted ,     Data:  Lab Results   Component Value Date    WBC 4.1 03/09/2021    HGB 12.5 (L) 03/09/2021    HCT 37.8 (L) 03/09/2021    MCV 88.6 03/09/2021     03/09/2021     Lab Results   Component Value Date     03/02/2021    K 4.1 03/02/2021     03/02/2021    CO2 28 03/02/2021    BUN 15 03/02/2021    CREATININE 0.54 (L) 03/02/2021    GLUCOSE 132 (H) 03/02/2021    CALCIUM 10.4 03/02/2021    PROT 6.2 (L) 03/02/2021    LABALBU 3.6 03/02/2021    BILITOT 0.42 03/02/2021    ALKPHOS 60 03/02/2021    AST 13 03/02/2021    ALT 6 03/02/2021    LABGLOM >60 03/02/2021    GFRAA >60 03/02/2021    GLOB NOT REPORTED 04/26/2019       Surgical Pathology Report   Surgical Pathology   Collected:  10/01/20 1600    Lab status:  Final    Resulting lab:  [facility]    Value:  -- Diagnosis --   RIGHT THORACENTESIS FLUID:          METASTATIC ADENOCARCINOMA, COMPATIBLE WITH LUNG PRIMARY.       IMAGING DATA:  CT chest PE   No central or segmental pulmonary embolus.         Large right pleural effusion.         Multiple masses within the right hemithorax which appear to be pleural based    and along the pericardium.  Metastatic disease is suspected      Chest x-ray 9/29/2020  Interval increase in now moderate right basilar opacity with opacification of    the inferior 1/2 of the right hemithorax, findings likely related to    increasing moderately large right pleural effusion with underlying    atelectasis/infiltrate and or mass       CT abd pelvis- 10/2/20  Impression    Scattered right pleural soft tissue nodules and mediastinal lymphadenopathy    reflecting malignancy.         Otherwise, no evidence for metastatic disease in the abdomen or pelvis. MR brain  Impression    Unremarkable MRI of brain without abnormal postcontrast enhancement. 1/21/21 CT chest abd pelvis  Impression   Significant increase in the pleural and pericardial studding in the right   hemithorax with large confluent soft tissue masses with only small areas of   pleural sparing along the upper anterolateral pleural margin.       Loculated moderate right pleural effusion that remains simple fluid in   attenuation with a basilar pleural drain in place.       Linear opacities extending from the right suprahilar region to the right lung   apex with surrounding mild ground-glass and interstitial prominence are not   significantly changed relative to prior, but there is slight increase in the   soft tissue fullness about the right hilum.       No findings of metastatic disease below the diaphragm. PMH:  Past Medical History:   Diagnosis Date    Arthritis     Hearing loss     Hyperlipidemia     Hypertension     Lung cancer ([address] 10/2020    stage 4, cells in pleural space.  Malignant pleural effusion     RIGHT    On supplemental oxygen therapy     Prostate cancer ([address] 1992 approx    treated surgically    Thyroid cancer ([address] 2017    treated surgically and with radiation    Thyroid disease       Allergies: No Known Allergies     Assessment    1.   Non-small cell cancer, right-sided: Large right-sided pleural effusion with multiple pleural-based masses present suspicion for metastatic disease, cytology from thoracentesis showed adenocarcinoma. His cancer negative for EGFR, ALK, ROS1, RET mutations. ,  But positive for BRAF V6 100 E mutation. PD-L1 expression positive with TPS > 1%. CT abdomen and MRI brain negative for metastasis. Currently he ws started on single agent Keytruda and completed 3 cycles so far. Restaging scans showed progression. Now started on Tafinlar plus Mekinist  2. Hypercalcemia: This could be causing his significant weakness and confusion. 3. Pericardial lymphadenopathy  4. Large right-sided pleural effusion: Status post Pleurx catheter placement  5. Prostrate cancer 28 years ago status post resection   6. Thyroid cancer 4 years ago status post resection and radiotherapy  Plan   I reviewed the recent lab work, imaging studies, diagnosis, goals of care and also discussed treatment options with patient and family   So far he has completed 3 cycles of Keytruda. Schedule use of recent scan unfortunately which showed progression of disease. Is next-generation sequencing with positive for BRAF V6 and a mutation therefore he would be candidate for Tafinlar plus Mekinist  Given the toxicity concern and his age, I will use Tafinlar 75 mg twice daily instead of 150 mg twice daily. And if his tolerated then we will go up on the dose  I reviewed the risk and benefit and also discussed side effects with patient and family. I also discussed option of comfort care. He is tolerating treatment well so far  Return to clinic in 2 to 3 weeks with labs prior    Kev Kennedy MD  Hematologist/Medical Oncologist    This note is created with the assistance of a speech recognition program.  While intending to generate a document that actually reflects the content of the visit, the document can still have some errors including those of syntax and sound a like substitutions which may escape proof reading.   It such instances, actual meaning can be extrapolated by contextual diversion.

## 2021-03-09 NOTE — TELEPHONE ENCOUNTER
AVS from 3/9/21     RTC 3 weeks with cbc and cmp    rv scheduled for 3/3/21 @ 11:45am  Labs at that time    Pt was given AVS and appt schedule

## 2021-03-10 NOTE — TELEPHONE ENCOUNTER
Name: Rere Cronin  : 1932  MRN: [id number]    Oncology Navigation Follow-Up Note  Navigator reaching out to pt. Offering assistance. Pt. States, \" I think the new medicine's working, but it might be too soon to tel? \" Pt. Denies needs at this time, Plan to follow.      Electronically signed by Jessica Cooper RN on 3/10/2021 at 3:23 PM

## 2021-03-11 NOTE — TELEPHONE ENCOUNTER
Chart reviewed, navigation made a Call to [facility] yesterday. Patient denies concerns. Patient did not voice side effects. Will continue to monitor.

## 2021-03-12 NOTE — TELEPHONE ENCOUNTER
Call received from Maria Elena Elaine, daughter, stating starting last night patient began experiencing issues with weakness and nausea. Maria Elena Elaine stated that the patient got up last night from sleeping to use the restroom and fell. Maria Elena Elaine stated that the patient's wife called 46 and they assisted patient up and patient refused need for ER evaluation. Maria Elena Elaine stated that the patient drinks Boost nutritional drinks and that's about it. Writer instructed patient to drink at least 4-6 bottles/glasses of water a day. Writer also encouraged patient to eat 5-6 snacks every 2-3 hours while awake to help with caloric intake. Maria Elena Elaine stated that the patient has intermittent nausea. Rx for Zofran sent to the pharmacy. Maria Elena Elaine instructed to contact office with temperature of 100.4 or greater, with any chills, increased fatigue/weakness or any worsening s/s and patient to go to ER STAT with any severe s/s or temperature of 102 or greater. Maria Elena Elaine communicated understanding. Writer instructed Shara oral chemo compliance RN, of above. Shara to follow up with patient on Monday, 3/15, to see how above noted s/s are.  Abner Martel

## 2021-03-15 NOTE — TELEPHONE ENCOUNTER
Writer called River to check status. River feels as though he is doing better over the past 2 days. River denies nausea, and he does not feel as weak. Goal: we spoke about increasing his oral intake. Intervention:  1. River will increase his intake of solid meals vs liquid supplement for at least 2 meals daily. 2. Daphane Phoenix will also increase hydration. 3. Writer also discussed standing slowly from a sitting or lying position to prevent dizzyness. Daphane Phoenix says that he does do this already. Desired outcome: maintain compliance. Will continue to monitor. transfer training/balance training/gait training/bed mobility training/strengthening

## 2021-03-15 NOTE — TELEPHONE ENCOUNTER
Okay to drain pleural fluid once a day week. I would like to get a CT chest before planning removal of pleural drain. Order placed for CT close to the appointment in April. Vikki Francisco

## 2021-03-15 NOTE — TELEPHONE ENCOUNTER
Left Gin Said Voice mail to inform.  Advised her to call [phone] scheduling - or call office and I will help schedule CT

## 2021-03-19 NOTE — TELEPHONE ENCOUNTER
I have in my note we stopped metoprolol on 12/12/20. Yes, it should be discontinued and patient should not be taking. If is not open will they take it back. I called patient and instructed him not to take it.  He is in agreement and realizes he should not take

## 2021-03-19 NOTE — TELEPHONE ENCOUNTER
Pt daughter states that metoprolol tartrate (LOPRESSOR) 25 MG tablet should not be on pt list and that pt was taken off medication on OV 1/26/21 due to BP dropping very low. Pharmacy sent in for refill and pt picked up from pharmacy, did not open/take  Medication.      Please advise

## 2021-03-22 PROBLEM — E44.0 MODERATE PROTEIN-CALORIE MALNUTRITION (HCC): Status: ACTIVE | Noted: 2021-01-01

## 2021-03-22 NOTE — PATIENT INSTRUCTIONS
Personalized Preventive Plan for Juan Lei - 3/22/2021  Medicare offers a range of preventive health benefits. Some of the tests and screenings are paid in full while other may be subject to a deductible, co-insurance, and/or copay. Some of these benefits include a comprehensive review of your medical history including lifestyle, illnesses that may run in your family, and various assessments and screenings as appropriate. After reviewing your medical record and screening and assessments performed today your provider may have ordered immunizations, labs, imaging, and/or referrals for you. A list of these orders (if applicable) as well as your Preventive Care list are included within your After Visit Summary for your review. Other Preventive Recommendations:    · A preventive eye exam performed by an eye specialist is recommended every 1-2 years to screen for glaucoma; cataracts, macular degeneration, and other eye disorders. · A preventive dental visit is recommended every 6 months. · Try to get at least 150 minutes of exercise per week or 10,000 steps per day on a pedometer . · Order or download the FREE \"Exercise & Physical Activity: Your Everyday Guide\" from [employer]. Call 9-836.345.2857 or search [employer] online. · You need 3434-2780 mg of calcium and 5169-7202 IU of vitamin D per day. It is possible to meet your calcium requirement with diet alone, but a vitamin D supplement is usually necessary to meet this goal.  · When exposed to the sun, use a sunscreen that protects against both UVA and UVB radiation with an SPF of 30 or greater. Reapply every 2 to 3 hours or after sweating, drying off with a towel, or swimming. · Always wear a seat belt when traveling in a car. Always wear a helmet when riding a bicycle or motorcycle.

## 2021-03-22 NOTE — PROGRESS NOTES
cervical lymphadenopathy   Pulmonary/Chest: clear to auscultation bilaterally- no wheezes, rales or rhonchi, normal air movement, no respiratory distress  Cardiovascular: normal rate, normal S1 and S2, no gallops and no carotid bruits  Abdomen: soft, non-tender, non-distended, normal bowel sounds, no masses or organomegaly  Extremities: no cyanosis, no clubbing and no edema  Musculoskeletal: ROM-  normal  Neurologic: speech normal and gait abnormal- ataxic r/t weakness    Patient's complete Health Risk Assessment and screening values have been reviewed and are found in Flowsheets. The following problems were reviewed today and where indicated follow up appointments were made and/or referrals ordered. Positive Risk Factor Screenings with Interventions:     Fall Risk:  2 or more falls in past year?: (!) yes  Fall with injury in past year?: (!) yes  Fall Risk Interventions:    · Encouraged to increase fluids     Depression:  Depression Unable to Assess: Pt refuses    Severity:1-4 = minimal depression, 5-9 = mild depression, 10-14 = moderate depression, 15-19 = moderately severe depression, 20-27 = severe depression        General Health and ACP:  General  In general, how would you say your health is?: Fair  In the past 7 days, have you experienced any of the following?  New or Increased Pain, New or Increased Fatigue, Loneliness, Social Isolation, Stress or Anger?: (!) Loneliness, Social Isolation  Do you get the social and emotional support that you need?: Yes  Do you have a Living Will?: Yes  Advance Directives     Power of  Living Will ACP-Advance Directive ACP-Power of Mesfin Tarango on 01/26/21 Not on File Not on [place] Risk Interventions:  · Loneliness: patient declines any further intervention for this issue  · Social isolation: patient declines any further intervention for this issue, Angelica Hernández, daughter, visits every 3-4 days    Health Habits/Nutrition:  Health Habits/Nutrition  Do you exercise for at least 20 minutes 2-3 times per week?: (!) No  Have you lost any weight without trying in the past 3 months?: (!) Yes  Do you eat only one meal per day?: (!) Yes  Have you seen the dentist within the past year?: (!) No     Health Habits/Nutrition Interventions:  · Inadequate physical activity:  Not appropriate to start exercise program  · Nutritional issues:  patient takes protein supplement in morning  · Dental exam overdue:  patient encouraged to make appointment with his/her dentist    Hearing/Vision:  No exam data present  Hearing/Vision  Do you or your family notice any trouble with your hearing that hasn't been managed with hearing aids?: (!) Yes  Do you have difficulty driving, watching TV, or doing any of your daily activities because of your eyesight?: No  Have you had an eye exam within the past year?: (!) No  Hearing/Vision Interventions:  · not a concern    Safety:  Safety  Do you have working smoke detectors?: Yes  Have all throw rugs been removed or fastened?: Yes  Do you have non-slip mats or surfaces in all bathtubs/showers?: Yes  Do all of your stairways have a railing or banister?: Yes(no stairs)  Are your doorways, halls and stairs free of clutter?: (!) No  Do you always fasten your seatbelt when you are in a car?: Yes  Safety Interventions:  · Home safety tips provided    ADL:  ADLs  In the past 7 days, did you need help from others to perform any of the following everyday activities? Eating, dressing, grooming, bathing, toileting, or walking/balance?: None  In the past 7 days, did you need help from others to take care of any of the following? Laundry, housekeeping, banking/finances, shopping, telephone use, food preparation, transportation, or taking medications?: [employer], Shopping, Transportation, Taking Medications  ADL Interventions:  · he has services from [facility] -  every 3 weeks, they have stopped nursing services.     Personalized Preventive Plan   Current Health Maintenance Status  Immunization History   Administered Date(s) Administered    COVID-19, Prcie Peter, PF, 30mcg/0.3mL 01/08/2021, 01/29/2021    Influenza Virus Vaccine 11/05/2020    Influenza, High Dose (Fluzone 65 yrs and older) 09/01/2017, 09/20/2018, 10/15/2018, 10/01/2019    Influenza, Triv, inactivated, subunit, adjuvanted, IM (Fluad 65 yrs and older) 10/01/2019    Pneumococcal Conjugate 13-valent ([id number]) 03/27/2018    Pneumococcal Polysaccharide ([id number]) 10/10/2018    Zoster Recombinant (Shingrix) 03/12/2019        Health Maintenance   Topic Date Due    DTaP/Tdap/Td vaccine (1 - Tdap) Never done    Shingles Vaccine (2 of 2) 05/07/2019    Annual Wellness Visit (AWV)  Never done    Lipid screen  04/26/2020    PSA counseling  10/01/2021    TSH testing  12/29/2021    Potassium monitoring  03/09/2022    Creatinine monitoring  03/09/2022    Flu vaccine  Completed    Pneumococcal 65+ years Vaccine  Completed    COVID-19 Vaccine  Completed    Hepatitis A vaccine  Aged Out    Hepatitis B vaccine  Aged Out    Hib vaccine  Aged Out    Meningococcal (ACWY) vaccine  Aged Out     Recommendations for [employer] Due: see orders and patient instructions/AVS.  . Recommended screening schedule for the next 5-10 years is provided to the patient in written form: see Patient Instructions/AVS.    Marily Riedel was seen today for medicare awv. Diagnoses and all orders for this visit:    Routine general medical examination at a health care facility  Lung cancer    Moderate protein-calorie malnutrition ([address]  Weight 138  Encouraged to add another bottle of Equate protein drink to intake                 Advance Care Planning   Advanced Care Planning: Discussed the patients choices for care and treatment in case of a health event that adversely affects decision-making abilities. Also discussed the patients long-term treatment options.  Reviewed with the patient the [facility] [address] [place] [address] Declaration forms  Reviewed the process of designating a competent adult as an Agent (or -in-fact) that could take make health care decisions for the patient if incompetent. Patient was asked to complete the declaration forms, either acknowledge the forms by a public notary or an eligible witness and provide a signed copy to the practice office.   Time spent (minutes): 10

## 2021-03-27 PROBLEM — R53.1 WEAKNESS: Status: ACTIVE | Noted: 2021-01-01

## 2021-03-27 NOTE — ED PROVIDER NOTES
[address] [facility]  [address] [place] [address]  Phone: [phone]  Fax: [address]      Pt Name: Radha Sanchez  MRN: [id number]  [place] 7/2/1932  Date of evaluation: 3/27/2021  Provider: Arlene Vanegas PA-C    CHIEF COMPLAINT       Chief Complaint   Patient presents with    Fall     weakness yesterday-denies striking head-No LOC/No anticoags           HISTORY OF PRESENT ILLNESS  (Location/Symptom, Timing/Onset, Context/Setting, Quality, Duration, Modifying Factors, Severity.)   Radha Sanchez is a 80 y.o. male who presents to the emergency department for evaluation of generalized weakness and falls over the last 2 days. Fall this morning, against wall that was witnessed. No LOC. Pt has no traumatic pain complaints. nonbloody diarrhea X 3 days and some mild nausea/vomiting as well. Making urine reportedly but decreased oral fluid intake. Stage 4 Lung cancer on right with malignant effusion/drain in place, family states localized and w/o mets. Pt lives at home and is primary caregiver for his wife with dementia. He typically is using a walker for ambulation. He has home 02 but doesn't use it daily. He is managed at [facility] and sees Dr. Nano Cramer. He has been receiving immunotherapy. No f//c. Nursing Notes were reviewed. REVIEW OF SYSTEMS    (2-9 systems for level 4, 10 or more for level 5)     Review of Systems   Constitutional: Negative. HENT: Negative. Eyes: Negative. Respiratory: Negative. Cardiovascular: Negative. Gastrointestinal: Negative. Musculoskeletal: Negative. Endocrine: Negative. Genitourinary: Negative. Skin: Negative. Allergic/Immunologic: Negative. Neurological: Negative. Hematological: Negative. Psychiatric/Behavioral: Negative. Except as noted above the remainder of the review of systems was reviewed and negative. PAST MEDICAL HISTORY   History reviewed.     Past Medical History:   Diagnosis Date    Arthritis     Hearing loss     Hyperlipidemia     Hypertension     Lung cancer ([facility] [address] [facility] 10/2020    stage 4, cells in pleural space.  Malignant pleural effusion     RIGHT    On supplemental oxygen therapy     Prostate cancer ([facility] 1992 approx    treated surgically    Thyroid cancer ([facility] [address] 2017    treated surgically and with radiation    Thyroid disease          SURGICAL HISTORY     History reviewed. Past Surgical History:   Procedure Laterality Date    CARPAL TUNNEL RELEASE Bilateral     IR INSERT TUNNELED PLEURA CATH W CUFF  10/19/2020    IR INSERT TUNNELED PLEURA CATH W CUFF 10/19/2020 Randall Mcleod MD [facility]    MOUTH SURGERY      dental repairs    PROSTATECTOMY      THYROIDECTOMY           CURRENT MEDICATIONS       Previous Medications    DABRAFENIB MESYLATE 75 MG CAPS    Take 75 mg by mouth 2 times daily    GLUCOS-CHOND-HYAL AC-CA FRUCTO (MOVE FREE JOINT HEALTH ADVANCE PO)    Take 1 tablet by mouth daily    LEVOTHYROXINE (SYNTHROID) 100 MCG TABLET    TAKE 1 TABLET BY MOUTH EVERY DAY    LORATADINE (CLARITIN) 10 MG TABLET    Take 1 tablet by mouth daily    MELOXICAM (MOBIC) 15 MG TABLET    Take 1 tablet by mouth daily    ONDANSETRON (ZOFRAN) 4 MG TABLET    Take 1 tablet by mouth 3 times daily as needed for Nausea or Vomiting    SIMVASTATIN (ZOCOR) 40 MG TABLET    TAKE 1 TABLET BY MOUTH EVERY NIGHT    TRAMETINIB DIMETHYL SULFOXIDE (MEKINIST) 2 MG TABS    Take 2 mg by mouth daily       ALLERGIES     Patient has no known allergies. FAMILY HISTORY           Problem Relation Age of Onset    Prostate Cancer Father     Diabetes Brother      Family Status   Relation Name Status    Father  (Not Specified)    Brother  (Not Specified)          SOCIAL HISTORY      reports that he quit smoking about 52 years ago. His smoking use included cigarettes. He has a 20.00 pack-year smoking history.  He has never used smokeless tobacco. He reports current alcohol Result   [   Mild central and cortical cerebral atrophy. Mild chronic deep white matter ischemic changes      No acute intracranial abnormalities are noted. XR CHEST PORTABLE   Final Result   Moderate right-sided pleural effusion with adjacent atelectasis/airspace   disease. LABS:  Labs Reviewed   LACTATE, SEPSIS - Abnormal; Notable for the following components:       Result Value    Lactic Acid, Sepsis 8.1 (*)     All other components within normal limits   CBC WITH AUTO DIFFERENTIAL - Abnormal; Notable for the following components:    Hemoglobin 12.8 (*)     Hematocrit 39.1 (*)     Platelets 99 (*)     Seg Neutrophils 84 (*)     Lymphocytes 6 (*)     Monocytes 10 (*)     Eosinophils % 0 (*)     Absolute Lymph # 0.35 (*)     All other components within normal limits   COMPREHENSIVE METABOLIC PANEL - Abnormal; Notable for the following components:    Glucose 153 (*)     BUN 26 (*)     Calcium 8.4 (*)     Sodium 132 (*)     Chloride 97 (*)     CO2 18 (*)     AST 90 (*)     All other components within normal limits   TROPONIN - Abnormal; Notable for the following components:    Troponin, High Sensitivity 69 (*)     All other components within normal limits   BRAIN NATRIURETIC PEPTIDE - Abnormal; Notable for the following components:    Pro-BNP 1,171 (*)     All other components within normal limits   LIPASE - Abnormal; Notable for the following components:    Lipase 94 (*)     All other components within normal limits   COVID-19, RAPID   CULTURE, BLOOD 1   CULTURE, BLOOD 1   PROTIME-INR   APTT   LACTATE, SEPSIS   URINE RT REFLEX TO CULTURE   TROPONIN         All other labs were within normal range or not returned as of this dictation.     EMERGENCY DEPARTMENT COURSE and DIFFERENTIAL DIAGNOSIS/MDM:   Vitals:    Vitals:    03/27/21 1236 03/27/21 1400 03/27/21 1507   BP: (!) 136/58 (!) 125/57 116/66   Pulse: 135 114 105   Resp: 24 14 14   Temp: 98.4 °F (36.9 °C)  98.6 °F (37 °C)   TempSrc: Oral  Oral   SpO2: 96% 100% 100%   Weight: 59 kg (130 lb)     Height: 5' 9\" (1.753 m)         1325  Falls at home, generalized weakness. Stage 4 Lung Cancer pt, here with son and brother. Needs evaluation for placement. Anticipate admit, labs/imaging ordered. Nontoxic. No traumatic complaints. Intra - Abdominal Source  Mild to Moderate Severity = Ceftriaxone 1 gm IV plus Metronidazole 500 mg IV, if Penicillin allergy then Levofloxacin 500 mg IV  High Severity or High Risk = Zosyn 3.375 gm IV or Cefepime 2 gm IV plus Metronidazole 500 mg IV, if Penicillin allergy then Levofloxacin 500 mg IV    Meningitis  Ceftriaxone 2 gm IV plus Vancomycin 25 mg/kg IV, if Penicillin allergy then Levofloxacin 500 mg IV plus Vancomycin 25 mg/kg IV   If concern for viral meningitis then add Acyclovir 10 mg/kg IV using ideal body weight  If concern for Pneumococcal meningitis then Dexamethasone 10 mg IV prior to antibiotics  If concern for Listeria based on CSF gram stain then add Ampicillin 2 gm IV or Bactrim 5 mg/kg IV if severe Penicillin allergy    Urosepsis  Community acquired = Ceftriaxone 1 gm IV, if Penicillin allergy then Meropenem 1 gm IV  Hospital acquired = Zosyn 3.375 gm IV or Cefepime 1 gm IV, if Penicillin allergy then Meropenem 1 gm IV    Skin / Soft Tissue  Cellulitis / Erysipelas = Ceftriaxone 1 gm IV plus Vancomycin 15 mg/kg IV, if Penicillin allergy then Levofloxacin 500 gm IV  Necrotizing infection / Severe Cellulitis = Zosyn 3.375 gm IV plus Vancomycin 15 mg/kg IV, if Penicillin allergy then Clindamycin 900 mg IV plus Vancomycin 15 mg/kg IV plus Ciprofloxacin 500 mg IV    Respiratory  Community Acquired Pneumonia = Ceftriaxone 1 gm IV plus Azithromycin 500 mg IV, if Penicillin allergy then Levofloxacin 750 mg IV.    Hospital Acquired Pneumonia = Zosyn 4.5 gm IV plus Vancomycin 15 mg/kg IV, if Penicillin allergy then Meropenem 1 gm IV plus Vancomycin 15 mg/kg IV    Unknown Source  Zosyn 3.375 gm IV plus Vancomycin 15 mg/kg IV, if Penicillin allergy then Meropenem 1 gm IV plus Vancomycin 15 mg/kg IV      IV Fluid Bolus:  Is lactate > 4.0:  Yes  If lactate >  4.0 OR hypotension (MAP<65 mmHg) (with 2 BP readings) IV fluids MUST be ordered. [id number]  Attending facilitated admit with Dr Sheng Raymond. We are giving a call to Oncology as well to inform him. No further orders for us requested. Saul 19 about pt being admitted here, he is on-call for Oncology group. CONSULTS:  None    PROCEDURES:  None    Patient instructed to return to the emergency room if symptoms worsen, return, or any other concern right away which is agreed. FINAL IMPRESSION      1. Fall, initial encounter    2. Elevated troponin    3. General weakness            DISPOSITION/PLAN   DISPOSITION     CONDITION:  Stable    PATIENT REFERRED TO:  No follow-up provider specified.     DISCHARGE MEDICATIONS:  New Prescriptions    No medications on file       (Please note that portions of this note were completed with a voice recognition program.  Efforts were made to edit the dictations but occasionally words are mis-transcribed.)    LANETTE Gil PA-C  03/27/21 [id number] [facility] Fawad Henning PA-C  03/27/21 6283

## 2021-03-27 NOTE — ED NOTES
Report given to Melissa German RN from MS/ICU. Report method by phone   The following was reviewed with receiving RN:   Current vital signs:  /65   Pulse 95   Temp 98.6 °F (37 °C) (Oral)   Resp 20   Ht 5' 9\" (1.753 m)   Wt 59 kg (130 lb)   SpO2 100%   BMI 19.20 kg/m²                MEWS Score: 1     Any medication or safety alerts were reviewed. Any pending diagnostics and notifications were also reviewed, as well as any safety concerns or issues, abnormal labs, abnormal imaging, and abnormal assessment findings. Questions were answered.           Vanessa Merritt RN  03/27/21 1794

## 2021-03-27 NOTE — ED PROVIDER NOTES
[id number] [facility]  [address] [facility] [address] [facility] [address]  Phone: [phone]  Fax: [phone]      Attending Physician Attestation    I performed a history and physical examination of the patient and discussed management with the mid level provider. I reviewed the mid level provider's note and agree with the documented findings and plan of care. Any areas of disagreement are noted on the chart. I was personally present for the key portions of any procedures. I have documented in the chart those procedures where I was not present during the key portions. I have reviewed the emergency nurses triage note. I agree with the chief complaint, past medical history, past surgical history, allergies, medications, social and family history as documented unless otherwise noted below. Documentation of the HPI, Physical Exam and Medical Decision Making performed by mid level providers is based on my personal performance of the HPI, PE and MDM. For Physician Assistant/ Nurse Practitioner cases/documentation I have personally evaluated this patient and have completed at least one if not all key elements of the E/M (history, physical exam, and MDM). Additional findings are as noted. CHIEF COMPLAINT       Chief Complaint   Patient presents with    Fall     weakness yesterday-denies striking head-No LOC/No anticoags         HISTORY OF PRESENT ILLNESS    Landy Guillermo is a 80 y.o. male who presents after a fall, stage 4 lung CA, fatigue. PAST MEDICAL HISTORY    has a past medical history of Arthritis, Hearing loss, Hyperlipidemia, Hypertension, Lung cancer ([address] Malignant pleural effusion, On supplemental oxygen therapy, Prostate cancer ([address] Thyroid cancer ([address] and Thyroid disease. SURGICAL HISTORY      has a past surgical history that includes Prostatectomy; Thyroidectomy; Carpal tunnel release (Bilateral); Mouth surgery; and IR INSERT TUNNELED PLEURA CATH W CUFF (10/19/2020). Sepsis Times and Checklist      Intra - Abdominal Source  Mild to Moderate Severity = Ceftriaxone 1 gm IV plus Metronidazole 500 mg IV, if Penicillin allergy then Levofloxacin 500 mg IV  High Severity or High Risk = Zosyn 3.375 gm IV or Cefepime 2 gm IV plus Metronidazole 500 mg IV, if Penicillin allergy then Levofloxacin 500 mg IV    Meningitis  Ceftriaxone 2 gm IV plus Vancomycin 25 mg/kg IV, if Penicillin allergy then Levofloxacin 500 mg IV plus Vancomycin 25 mg/kg IV   If concern for viral meningitis then add Acyclovir 10 mg/kg IV using ideal body weight  If concern for Pneumococcal meningitis then Dexamethasone 10 mg IV prior to antibiotics  If concern for Listeria based on CSF gram stain then add Ampicillin 2 gm IV or Bactrim 5 mg/kg IV if severe Penicillin allergy    Urosepsis  Community acquired = Ceftriaxone 1 gm IV, if Penicillin allergy then Meropenem 1 gm IV  Hospital acquired = Zosyn 3.375 gm IV or Cefepime 1 gm IV, if Penicillin allergy then Meropenem 1 gm IV    Skin / Soft Tissue  Cellulitis / Erysipelas = Ceftriaxone 1 gm IV plus Vancomycin 15 mg/kg IV, if Penicillin allergy then Levofloxacin 500 gm IV  Necrotizing infection / Severe Cellulitis = Zosyn 3.375 gm IV plus Vancomycin 15 mg/kg IV, if Penicillin allergy then Clindamycin 900 mg IV plus Vancomycin 15 mg/kg IV plus Ciprofloxacin 500 mg IV    Respiratory  Community Acquired Pneumonia = Ceftriaxone 1 gm IV plus Azithromycin 500 mg IV, if Penicillin allergy then Levofloxacin 750 mg IV.    Hospital Acquired Pneumonia = Zosyn 4.5 gm IV plus Vancomycin 15 mg/kg IV, if Penicillin allergy then Meropenem 1 gm IV plus Vancomycin 15 mg/kg IV    Unknown Source  Zosyn 3.375 gm IV plus Vancomycin 15 mg/kg IV, if Penicillin allergy then Meropenem 1 gm IV plus Vancomycin 15 mg/kg IV      DIAGNOSTIC RESULTS     EKG: All EKG's are interpreted by the Emergency Department Physician who either signs or Co-signs this chart in the absence of a cardiologist. Interpreted by Mary Butts MD     Rhythm: Sinus tachycardia  Rate: 123  Axis: 92  Ectopy: none  Conduction: normal  ST Segments: no acute change  T Waves: no acute change  Q Waves: none    Clinical Impression: Sinus tachycardia with no acute changes/normal EKG. No acute infarction/ischemia noted. RADIOLOGY:   Non-plain film images such as CT, Ultrasound and MRI are read by the radiologist. Jessica Barajas radiographic images are visualized and the radiologist interpretations are reviewed as follows:       Ct Head Wo Contrast    Result Date: 3/27/2021  EXAMINATION: CT OF THE HEAD WITHOUT CONTRAST  3/27/2021 11:13 am TECHNIQUE: CT of the head was performed without the administration of intravenous contrast. Dose modulation, iterative reconstruction, and/or weight based adjustment of the mA/kV was utilized to reduce the radiation dose to as low as reasonably achievable. COMPARISON: None. HISTORY: ORDERING SYSTEM PROVIDED HISTORY: fall TECHNOLOGIST PROVIDED HISTORY: fall Decision Support Exception->Emergency Medical Condition (MA) Reason for Exam: generalized weakness and falls over the last 2 days. Fall this morning Acuity: Acute Type of Exam: Initial FINDINGS: BRAIN/VENTRICLES: The cerebral hemispheres, brainstem, and cerebellum have a normal appearance for the patient's age. The falx is midline. The ventricles and peripheral sulci are mildly dilated. There is decreased attenuation in the periventricular white matter. There is no sign of a space occupying lesion, infarction, or hemorrhage. Orbits: Portion of the orbits demonstrate no acute abnormality. SINUSES: The  imaged portions of the paranasal sinuses are clear. The mastoids and the middle ear chambers are clear. SOFT TISSUES/SKULL:  No acute abnormality of the visualized skull or soft tissues. Vascular calcifications are seen compatible with atherosclerotic disease. [ Mild central and cortical cerebral atrophy.  Mild chronic deep white matter ischemic changes No acute intracranial abnormalities are noted. Ct Cervical Spine Wo Contrast    Result Date: 3/27/2021  EXAMINATION: CT OF THE CERVICAL SPINE WITHOUT CONTRAST 3/27/2021 11:13 am TECHNIQUE: CT of the cervical spine was performed without the administration of intravenous contrast. Multiplanar reformatted images are provided for review. Dose modulation, iterative reconstruction, and/or weight based adjustment of the mA/kV was utilized to reduce the radiation dose to as low as reasonably achievable. COMPARISON: Correlated with CT chest of 01/21/2021 HISTORY: ORDERING SYSTEM PROVIDED HISTORY: fall TECHNOLOGIST PROVIDED HISTORY: fall Decision Support Exception->Emergency Medical Condition (MA) Reason for Exam: generalized weakness and falls over the last 2 days. Fall this morning Acuity: Acute Type of Exam: Initial FINDINGS: The cervical spine demonstrates decreasedmineralization with levo angulation of the cervical spine. There is no evidence of fracture or subluxation. There is loss of disc height with eburnation of the vertebral endplates at the  N0-4, C6-7 and C7-T1 levels. There are small marginal osteophytes at multiple levels. The central canal is grossly patent. There is bilateral facet hypertrophy at multiple levels throughout the cervical spine. The pedicles and posterior elements are otherwise intact. The prevertebral and paravertebral soft tissues are unremarkable. The atlanto-dens interval and dens are intact. The visualized lung apices show right apical parenchymal and pleural soft tissue changes with loculated right pleural effusion, unchanged from prior exam.  Vascular calcifications are seen compatible with atherosclerotic disease. Multilevel cervical spondylosis and degenerative disc disease. Evidence of paracervical spasm.  No acute bony abnormalities are noted     Xr Chest Portable    Result Date: 3/27/2021  EXAMINATION: ONE XRAY VIEW OF THE CHEST 3/27/2021 2:24 pm COMPARISON: Ref Range    Glucose 153 (H) 70 - 99 mg/dL    BUN 26 (H) 8 - 23 mg/dL    CREATININE 1.12 0.70 - 1.20 mg/dL    Bun/Cre Ratio NOT REPORTED 9 - 20    Calcium 8.4 (L) 8.6 - 10.4 mg/dL    Sodium 132 (L) 135 - 144 mmol/L    Potassium 3.9 3.7 - 5.3 mmol/L    Chloride 97 (L) 98 - 107 mmol/L    CO2 18 (L) 20 - 31 mmol/L    Anion Gap 17 9 - 17 mmol/L    Alkaline Phosphatase 93 40 - 129 U/L    ALT 15 5 - 41 U/L    AST 90 (H) <40 U/L    Total Bilirubin 0.72 0.3 - 1.2 mg/dL    Total Protein 6.4 6.4 - 8.3 g/dL    Albumin 3.5 3.5 - 5.2 g/dL    Albumin/Globulin Ratio 1.2 1.0 - 2.5    GFR Non-African American >60 >60 mL/min    GFR African American >60 >60 mL/min    GFR Comment          GFR Staging NOT REPORTED    Troponin   Result Value Ref Range    Troponin, High Sensitivity 69 (HH) 0 - 22 ng/L    Troponin T NOT REPORTED <0.03 ng/mL    Troponin Interp NOT REPORTED    Brain Natriuretic Peptide   Result Value Ref Range    Pro-BNP 1,171 (H) <300 pg/mL    BNP Interpretation Pro-BNP Reference Range:    Lipase   Result Value Ref Range    Lipase 94 (H) 13 - 60 U/L   Protime-INR   Result Value Ref Range    Protime 12.1 9.4 - 12.6 sec    INR 1.2    APTT   Result Value Ref Range    PTT 27.7 21.3 - 31.3 sec           EMERGENCY DEPARTMENT COURSE:   Vitals:    Vitals:    03/27/21 1236 03/27/21 1400 03/27/21 1507   BP: (!) 136/58 (!) 125/57 116/66   Pulse: 135 114 105   Resp: 24 14 14   Temp: 98.4 °F (36.9 °C)  98.6 °F (37 °C)   TempSrc: Oral  Oral   SpO2: 96% 100% 100%   Weight: 59 kg (130 lb)     Height: 5' 9\" (1.753 m)       -------------------------  BP: 116/66, Temp: 98.6 °F (37 °C), Pulse: 105, Resp: 14      PERTINENT ATTENDING PHYSICIAN COMMENTS:    Patient presents with a fall weakness significant weight loss he has a stage IV lung cancer family members report that he is having an inability to care for himself at home they are concerned about his health at home given the abnormal labs I do feel he warrants admission to a hospital setting I discussed this with the patient and his family members they are agreeable to the admission the patient does have a living will where he does not want any advanced life support which family members will bring into the hospital.  Patient did have blood cultures obtained IV antibiotics were given as well as IV fluids      (Please note that portions of this note were completed with a voice recognition program.  Efforts were made to edit the dictations but occasionally words are mis-transcribed.)    Pittman MD, F.A.C.E.P.   Attending Emergency Medicine Physician       Madiha Marc MD  03/27/21 2282

## 2021-03-27 NOTE — ED NOTES
Dr Head Hockley speaking with Dr Crystal Do regarding admission.       Lorena Marley RN  03/27/21 1567

## 2021-03-27 NOTE — ED NOTES
Pt arrives via EMS from home where he lives with wife. Reported daughter who is RN assists in care. Per report- pt brought to ED d/t fall. Pt ambulates using walker- reports extreme weakness causing fall yesterday. Denies striking head/LOC. Pt reports emesis ongoing 2 weeks- took PO zofran PTA- no relief. Pt reports diarrhea ongoing for 3 days- arrives soiled. Pt AOx4. RR easy- family states pt has O2 at home but rarely uses it. EMS report hypoxia upon arrival to home- placed on 4l/NC- SpO2 WNL upon arrival. Pt hands cool on palpation. Placed on cardiac monitor.  Call light within reach     Radha Vee RN  03/27/21 9398

## 2021-03-27 NOTE — ED NOTES
Dr Vinay Pena contacted this unit- states he is not on call. Writer explains he was contacted because he is pts oncologist- Dr Vinay Pena states he is aware but not on call- notified to contact Dr Stevenson Saucedo notified. Contacted on call service at [phone].  Answering service states Dr Misty Matute on call     [place] RN  03/27/21 [address], RN  03/27/21 [address], RN  03/27/21 7510

## 2021-03-27 NOTE — ED NOTES
Writer to bedside- complete bed bath/linen,gown changed. Pt Heavily soiled underpants thrown into trash- oked by pt. Soiled robe/house shoes placed in bag- on counter in room. Pt tolerates well. Pt able to follow commands-log roll to assist writer. Abrasion noted to right scapula/ecchymosis noted behind right ear. Pt positioned for comfort- call light in reach. Placed back on monitor-VS stable post. Daughter arrives at bedside during bath.       Amberly Lorenzo RN  03/27/21 6234

## 2021-03-27 NOTE — PROGRESS NOTES
Pharmacy Note  Vancomycin Consult    Spenser Swann is a 80 y.o. male started on Vancomycin for sepsis; consult received from Dr. Meseret Hudson to manage therapy. Also receiving the following antibiotics: Zosyn. Patient Active Problem List   Diagnosis    Postoperative hypothyroidism    Essential hypertension    Hyperlipidemia    Osteoarthritis of both hips    H/O head and neck radiation    History of prostate cancer    Actinic keratosis    Neoplasm of uncertain behavior of skin    Pleural effusion    History of malignant neoplasm of thyroid    Lung mass    Dyspnea    Non-small cell carcinoma of right lung, stage 4 (HCC)    Hypercalcemia    Osteoarthrosis    Moderate protein-calorie malnutrition (HCC)    Weakness     Allergies:  Patient has no known allergies. Temp max: 98.6 F    Recent Labs     03/27/21  1320   BUN 26*   CREATININE 1.12   WBC 5.8       Intake/Output Summary (Last 24 hours) at 3/27/2021 1829  Last data filed at 3/27/2021 1642  Gross per 24 hour   Intake 1100 ml   Output    Net 1100 ml     Culture Date      Source                       Results  03.27.2021        blood x 2                   pending    Ht Readings from Last 1 Encounters:   03/27/21 5' 9\" (1.753 m)        Wt Readings from Last 1 Encounters:   03/27/21 135 lb 9.3 oz (61.5 kg)       Body mass index is 20.02 kg/m². Estimated Creatinine Clearance: 40 mL/min (based on SCr of 1.12 mg/dL). Goal Trough Level: 15-20 mcg/mL    Assessment/Plan:  Will initiate Vancomycin 1000 mg IV every 24 hours. Timing of trough level will be determined based on culture results, renal function, and clinical response. Thank you for the consult. Will continue to follow. Mihai Santiago, Pharm. D.  3/27/2021 6:31 PM

## 2021-03-27 NOTE — ED NOTES
Dr Sheng Raymond paged via [address] [place] [address] for admission orders.       Mckenzie Talbert RN  03/27/21 4567

## 2021-03-28 PROBLEM — A41.9 SEPSIS (HCC): Status: ACTIVE | Noted: 2021-01-01

## 2021-03-28 NOTE — PLAN OF CARE
Problem: Falls - Risk of:  Goal: Will remain free from falls  Description: Will remain free from falls  3/28/2021 0449 by Maureen Mathew RN  Outcome: Ongoing     Problem: Falls - Risk of:  Goal: Absence of physical injury  Description: Absence of physical injury  3/28/2021 0449 by Maureen Mathew RN  Outcome: Ongoing   Pt remained free of falls, non skid socks are on, bed alarm on, call light within reach, and patient instructed to call for help when needed. Problem: Skin Integrity:  Goal: Will show no infection signs and symptoms  Description: Will show no infection signs and symptoms  3/28/2021 0449 by Maureen Mathew RN  Outcome: Ongoing     Problem: Skin Integrity:  Goal: Absence of new skin breakdown  Description: Absence of new skin breakdown  3/28/2021 0449 by Maureen Mathew RN  Outcome: Ongoing   Patient turned and repositioned q 2 hrs and as needed. Heels elevated as needed. Wedges / pillow support used. Dressings changed as needed and per orders. Waffle mattress in place and properly inflated. Continue to monitor skin for breakdown.

## 2021-03-28 NOTE — H&P
shortness of breath and wheezing. Cardiovascular: Negative for chest pain and palpitations. Gastrointestinal: Negative for abdominal pain, diarrhea, nausea and vomiting. Genitourinary: Negative for dysuria and frequency. Musculoskeletal: Negative for arthralgias and myalgias. Neurological: Positive for weakness. Negative for dizziness, light-headedness and headaches. Psychiatric/Behavioral: Negative for sleep disturbance. No LMP for male patient. height is 5' 9\" (1.753 m) and weight is 141 lb 15.6 oz (64.4 kg). His axillary temperature is 97.1 °F (36.2 °C). His blood pressure is 102/65 and his pulse is 82. His respiration is 16 and oxygen saturation is 96%. CBC:   Lab Results   Component Value Date    WBC 3.6 03/28/2021    HGB 9.0 03/28/2021    PLT 80 03/28/2021     BMP:    Lab Results   Component Value Date     03/28/2021    K 3.4 03/28/2021     03/28/2021    CO2 23 03/28/2021    BUN 24 03/28/2021    CREATININE 0.75 03/28/2021    CALCIUM 6.9 03/28/2021    GLUCOSE 105 03/28/2021     PT/INR:    Lab Results   Component Value Date    PROTIME 12.1 03/27/2021    INR 1.2 03/27/2021     Troponin:  No results found for: TROPONINI    PHYSICAL EXAM:  Physical Exam  Vitals signs and nursing note reviewed. Constitutional:       General: He is not in acute distress. Appearance: He is well-developed. He is not ill-appearing. HENT:      Head: Normocephalic and atraumatic. Right Ear: External ear normal.      Left Ear: External ear normal.   Eyes:      General: No scleral icterus. Right eye: No discharge. Left eye: No discharge. Conjunctiva/sclera: Conjunctivae normal.      Pupils: Pupils are equal, round, and reactive to light. Neck:      Thyroid: No thyromegaly. Trachea: No tracheal deviation. Cardiovascular:      Rate and Rhythm: Normal rate and regular rhythm. Heart sounds: Normal heart sounds.    Pulmonary:      Effort: Pulmonary effort is normal. No respiratory distress. Breath sounds: Normal breath sounds. No wheezing. Lymphadenopathy:      Cervical: No cervical adenopathy. Skin:     General: Skin is warm. Findings: No rash. Neurological:      Mental Status: He is alert and oriented to person, place, and time. Motor: Weakness present. Psychiatric:         Mood and Affect: Mood normal.         Behavior: Behavior normal.         Thought Content: Thought content normal.            ASSESSMENT:  Generalized weakness  Lung cancer   Right pleural effusion        PLAN:  1. On zosyn and vanco to cover for possible right sided pneumonia. Pt had high lactic acid on admission  Continue home meds  Consult oncology. PT/OT eval.  May require SNF placement.

## 2021-03-28 NOTE — CONSULTS
_                         Today's Date: 3/28/2021  Patient Name: Monika Evans  Date of admission: 3/27/2021 12:29 PM  Patient's age: 80 y.o., 7/2/1932  Admission Dx: Weakness [R53.1]  Sepsis [facility]) [A41.9]      Requesting Physician: Brayan Flood MD    CHIEF COMPLAINT: Generalized weakness and fatigue. Multiple falls. Consult for stage IV lung cancer. History Obtained From:  patient, electronic medical record    HISTORY OF PRESENT ILLNESS:      The patient is a 80 y.o.  male who is admitted to the hospital for further management of extreme weakness and fatigue and recurrent falls. Symptoms were getting worse over the last 2 to 3 days. No loss of consciousness. No seizures. No nausea or vomiting. Patient was admitted and has been managed as possible sepsis. He started feeling better. Patient is known to our practice with stage IV non-small cell lung cancer. He had malignant pleural effusion. Patient was treated with Maridee Eaton but due to progression in January 2021 he was switched to different regimen. Currently he is on immunotherapy treatment with Tafinlar and Mekinist.  He tolerated the treatment fairly well with no major side effects. Past Medical History:   has a past medical history of Arthritis, Hearing loss, Hyperlipidemia, Hypertension, Lung cancer ([address] Malignant pleural effusion, On supplemental oxygen therapy, Prostate cancer ([address] Thyroid cancer ([address] and Thyroid disease. Past Surgical History:   has a past surgical history that includes Prostatectomy; Thyroidectomy; Carpal tunnel release (Bilateral); Mouth surgery; and IR INSERT TUNNELED PLEURA CATH W CUFF (10/19/2020). Family History: family history includes Diabetes in his brother; Prostate Cancer in his father. Social History:   reports that he quit smoking about 52 years ago. His smoking use included cigarettes. He has a 20.00 pack-year smoking history.  He has never used smokeless tobacco. He reports previous alcohol use. He reports that he does not use drugs. Medications:    Prior to Admission medications    Medication Sig Start Date End Date Taking?  Authorizing Provider   Dabrafenib Mesylate 75 MG CAPS Take 75 mg by mouth 2 times daily 3/26/21  Yes Navin Bauman MD   Trametinib Dimethyl Sulfoxide (MEKINIST) 2 MG TABS Take 2 mg by mouth daily 3/26/21  Yes Navin Bauman MD   ondansetron (ZOFRAN) 4 MG tablet Take 1 tablet by mouth 3 times daily as needed for Nausea or Vomiting 3/12/21  Yes Navin Bauman MD   meloxicam (MOBIC) 15 MG tablet Take 1 tablet by mouth daily 2/15/21  Yes LO Small CNP   levothyroxine (SYNTHROID) 100 MCG tablet TAKE 1 TABLET BY MOUTH EVERY DAY 1/26/21  Yes LO Small CNP   loratadine (CLARITIN) 10 MG tablet Take 1 tablet by mouth daily 1/12/21  Yes LO Small CNP   simvastatin (ZOCOR) 40 MG tablet TAKE 1 TABLET BY MOUTH EVERY NIGHT 10/19/20  Yes LO Small CNP   Glucos-Chond-Hyal Ac-Ca Fructo (MOVE FREE JOINT HEALTH ADVANCE PO) Take 1 tablet by mouth daily   Yes Historical Provider, MD     Current Facility-Administered Medications   Medication Dose Route Frequency Provider Last Rate Last Admin    potassium chloride (KLOR-CON M) extended release tablet 40 mEq  40 mEq Oral PRN Kyle Gómez MD   40 mEq at 03/28/21 1055    Or    potassium bicarb-citric acid (EFFER-K) effervescent tablet 40 mEq  40 mEq Oral PRN Kyle Gómez MD        Or    potassium chloride 10 mEq/100 mL IVPB (Peripheral Line)  10 mEq Intravenous PRN Kyle Gómez MD        sodium chloride flush 0.9 % injection 10 mL  10 mL Intravenous 2 times per day Keerthi Ca MD   10 mL at 03/28/21 0853    sodium chloride flush 0.9 % injection 10 mL  10 mL Intravenous PRN Keerthi Ca MD        0.9 % sodium chloride infusion  25 mL Intravenous PRN Keerthi Ca MD        acetaminophen (TYLENOL) tablet 650 mg  650 mg Oral Q4H PRN Narda Patterson MD        0.9 % sodium chloride infusion   Intravenous Continuous Hernesto Moreno MD 75 mL/hr at 03/28/21 1036 Rate Change at 03/28/21 1036    piperacillin-tazobactam (ZOSYN) 3,375 mg in dextrose 5 % 50 mL IVPB extended infusion (mini-bag)  3,375 mg Intravenous Q8H Hernesto Moreno MD   Stopped at 03/28/21 1743    pantoprazole (PROTONIX) injection 40 mg  40 mg Intravenous Daily Hernesto Moreno MD   40 mg at 03/28/21 1990    And    sodium chloride (PF) 0.9 % injection 10 mL  10 mL Intravenous Daily Hernesto Moreno MD   10 mL at 03/28/21 0853    ondansetron (ZOFRAN) injection 4 mg  4 mg Intravenous Q6H PRN Hernesto Moreno MD        zolpidem [facility]) tablet 5 mg  5 mg Oral Nightly PRN Hernesto Moreno MD        levothyroxine (SYNTHROID) tablet 100 mcg  100 mcg Oral Daily Hernesto Moreno MD   100 mcg at 03/28/21 3531    vancomycin (VANCOCIN) intermittent dosing (placeholder)   Other RX Love Diaz MD        vancomycin (VANCOCIN) 1000 mg in dextrose 5% 200 mL IVPB  1,000 mg Intravenous Q24H Hernesto Moreno MD        enoxaparin (LOVENOX) injection 40 mg  40 mg Subcutaneous Daily Narda Patterson MD   40 mg at 03/28/21 1435       Allergies:  Patient has no known allergies. REVIEW OF SYSTEMS:      · General: Significant for weakness and fatigue. Significant for weight loss and decreased appetite. No fever or chills. · Eyes: No blurred vision, eye pain or double vision. · Ears: No hearing problems or drainage. No tinnitus. · Throat: No sore throat, problems with swallowing or dysphagia. · Respiratory: As above. · Cardiovascular: No chest pain, orthopnea or PND. No lower extremity edema. No palpitation. · Gastrointestinal: No problems with swallowing. No abdominal pain or bloating. No nausea or vomiting. No diarrhea or constipation. No GI bleeding.    · Genitourinary: No dysuria, hematuria, frequency or urgency. · Musculoskeletal: No muscle aches or pains. No limitation of movement. No back pain. No gait disturbance, No joint complaints. · Dermatologic: No skin rashes or pruritus. No skin lesions or discolorations. · Psychiatric: No depression, anxiety, or stress or signs of schizophrenia. No change in mood or affect. · Hematologic: No history of bleeding tendency. No bruises or ecchymosis. No history of clotting problems. · Infectious disease: No fever, chills or frequent infections. · Endocrine: No polydipsia or polyuria. No temperature intolerance. · Neurologic: No headaches or dizziness. Generalized weakness of the extremities. No changes in balance, coordination,  memory, mentation, behavior. · Allergic/Immunologic: No nasal congestion or hives. No repeated infections. PHYSICAL EXAM:      /61   Pulse 86   Temp 97.5 °F (36.4 °C) (Temporal)   Resp 18   Ht 5' 9\" (1.753 m)   Wt 141 lb 15.6 oz (64.4 kg)   SpO2 95%   BMI 20.97 kg/m²    Temp (24hrs), Av.3 °F (36.3 °C), Min:97.1 °F (36.2 °C), Max:97.5 °F (36.4 °C)      General appearance - not in pain or distress  Mental status - alert and oriented  Eyes - pupils equal and reactive, extraocular eye movements intact  Ears - bilateral TM's and external ear canals normal  Nose - normal and patent, no erythema, discharge or polyps  Mouth - mucous membranes moist, pharynx normal without lesions  Neck - supple, no significant adenopathy  Lymphatics - no palpable lymphadenopathy, no hepatosplenomegaly  Chest -decreased air entry especially in the right side. No wheezing.     Heart - normal rate, regular rhythm, normal S1, S2, no murmurs, rubs, clicks or gallops  Abdomen - soft, nontender, nondistended, no masses or organomegaly  Neurological - alert, oriented, normal speech, no focal findings or movement disorder noted  Musculoskeletal - no joint tenderness, deformity or swelling  Extremities - peripheral elements are otherwise intact. The prevertebral and  paravertebral soft tissues are unremarkable. The atlanto-dens interval and  dens are intact. The visualized lung apices show right apical parenchymal and  pleural soft tissue changes with loculated right pleural effusion, unchanged  from prior exam.  Vascular calcifications are seen compatible with  atherosclerotic disease. Impression: Multilevel cervical spondylosis and degenerative disc disease. Evidence of paracervical spasm. No acute bony abnormalities are noted  XR CHEST PORTABLE  Narrative: EXAMINATION:  ONE XRAY VIEW OF THE CHEST    3/27/2021 2:24 pm    COMPARISON:  10/12/2020    HISTORY:  ORDERING SYSTEM PROVIDED HISTORY: fall, tachycardia  TECHNOLOGIST PROVIDED HISTORY:  fall, tachycardia  Reason for Exam: fall, tachycardia  Acuity: Acute  Type of Exam: Initial    FINDINGS:  Moderate pleural effusion with adjacent atelectasis/airspace disease. Left  hemithorax is clear. Trachea is midline. Mild generalized osteopenia. Subtle atherosclerotic calcification of the aortic arch. Impression: Moderate right-sided pleural effusion with adjacent atelectasis/airspace  disease. CT HEAD WO CONTRAST  Narrative: EXAMINATION:  CT OF THE HEAD WITHOUT CONTRAST  3/27/2021 11:13 am    TECHNIQUE:  CT of the head was performed without the administration of intravenous  contrast. Dose modulation, iterative reconstruction, and/or weight based  adjustment of the mA/kV was utilized to reduce the radiation dose to as low  as reasonably achievable. COMPARISON:  None. HISTORY:  ORDERING SYSTEM PROVIDED HISTORY: fall  TECHNOLOGIST PROVIDED HISTORY:  fall    Decision Support Exception->Emergency Medical Condition (MA)  Reason for Exam: generalized weakness and falls over the last 2 days.   Fall  this morning  Acuity: Acute  Type of Exam: Initial    FINDINGS:  BRAIN/VENTRICLES:    The cerebral hemispheres, brainstem, and cerebellum have a normal appearance  for the patient's age. The falx is midline. The ventricles and peripheral  sulci are mildly dilated. There is decreased attenuation in the  periventricular white matter. There is no sign of a space occupying lesion,  infarction, or hemorrhage. Orbits: Portion of the orbits demonstrate no acute abnormality. SINUSES: The  imaged portions of the paranasal sinuses are clear. The  mastoids and the middle ear chambers are clear. SOFT TISSUES/SKULL:  No acute abnormality of the visualized skull or soft  tissues. Vascular calcifications are seen compatible with atherosclerotic  disease. Impression: [  Mild central and cortical cerebral atrophy. Mild chronic deep white matter ischemic changes    No acute intracranial abnormalities are noted. IMPRESSION:    Primary Problem  <principal problem not specified>    Active Hospital Problems    Diagnosis Date Noted    [facility] [A41.9] 03/28/2021    Weakness [R53.1] 03/27/2021   Excessive weakness and fatigue. Stage IV lung adenocarcinoma. Malignant pleural effusion. RECOMMENDATIONS:  1. Records and labs and images were reviewed and discussed with the patient. 2. Patient was admitted due to the above-mentioned problems and he on antibiotics and fluids. He is already feeling better. Continue to drain fluid from the right pleural effusion which will help his breathing. 3. Overall condition may be deteriorating performance and he may need placement. 4. As for the lung cancer is concerned, patient has stage IV disease and he received Keytruda in the past.  Currently on immunotherapy with Tafinlar and Mekinist.  No significant side effects. Patient is still interested in continuing active therapy. Treatment can be resumed after discharge. 5. Monitor labs. 6. Patient's questions were answered to the best of his satisfaction and he verbalized full understanding and agreement.   7. Thank you for allowing us to participate in the care of this pleasant

## 2021-03-29 NOTE — PROGRESS NOTES
Progress Note  3/29/2021 9:05 AM  Subjective:   Admit Date: 3/27/2021  PCP: LO Griffin CNP  CC: weakness  Interval History: pt states he thinks he feels a bit better. No new complaints. No pain or SOB. Diet: DIET GENERAL;  Dietary Nutrition Supplements: Standard High Calorie Oral Supplement  Medications:   Scheduled Meds:   vancomycin  1,000 mg Intravenous Q24H    sodium chloride flush  10 mL Intravenous 2 times per day    piperacillin-tazobactam  3,375 mg Intravenous Q8H    pantoprazole  40 mg Intravenous Daily    And    sodium chloride (PF)  10 mL Intravenous Daily    levothyroxine  100 mcg Oral Daily    vancomycin (VANCOCIN) intermittent dosing (placeholder)   Other RX Placeholder    enoxaparin  40 mg Subcutaneous Daily     Continuous Infusions:   sodium chloride      sodium chloride 75 mL/hr at 03/29/21 0127     CBC:   Recent Labs     03/27/21  1320 03/28/21  0532 03/29/21  0545   WBC 5.8 3.6 3.1*   HGB 12.8* 9.0* 8.7*   PLT 99* 80* 101*     BMP:    Recent Labs     03/27/21  1320 03/28/21  0532 03/29/21  0545   * 134* 135   K 3.9 3.4* 3.3*   CL 97* 105 106   CO2 18* 23 22   BUN 26* 24* 16   CREATININE 1.12 0.75 0.56*   GLUCOSE 153* 105* 102*     Hepatic:   Recent Labs     03/27/21  1320   AST 90*   ALT 15   BILITOT 0.72   ALKPHOS 93     Troponin: No results for input(s): TROPONINI in the last 72 hours. BNP: No results for input(s): BNP in the last 72 hours. Lipids: No results for input(s): CHOL, HDL in the last 72 hours.     Invalid input(s): LDLCALCU  INR:   Recent Labs     03/27/21  1320   INR 1.2       Objective:   Vitals: /65   Pulse 93   Temp 98.8 °F (37.1 °C) (Oral)   Resp 18   Ht 5' 9\" (1.753 m)   Wt 141 lb 15.6 oz (64.4 kg)   SpO2 99%   BMI 20.97 kg/m²   General appearance: alert and cooperative with exam  Neck: supple, symmetrical, trachea midline  Lungs: clear to auscultation bilaterally  Heart: regular rate and rhythm, S1, S2 normal, no murmur, click, rub or gallop  Abdomen: soft, non-tender; bowel sounds normal; no masses,  no organomegaly  Extremities: extremities normal, atraumatic, no cyanosis or edema  Neurologic: Mental status: Alert, oriented, thought content appropriate    Assessment and Plan:   1. Generalized weakness and multiple falls- most likely due to dehydration. - therapy-- work on placement. 2. Lung cancer- probably needs skilled.    3. Anemia- since hydrated- continue to push po     Patient Active Problem List:     Postoperative hypothyroidism     Essential hypertension     Hyperlipidemia     Osteoarthritis of both hips     H/O head and neck radiation     History of prostate cancer     Actinic keratosis     Neoplasm of uncertain behavior of skin     Pleural effusion     History of malignant neoplasm of thyroid     Lung mass     Dyspnea     Primary adenocarcinoma of right lung (HCC)     Hypercalcemia     Osteoarthrosis     Moderate protein-calorie malnutrition (HCC)     General weakness     Sepsis ([address]     Fall     Malignant pleural effusion      Electronically signed by Dale Crow MD on 3/29/2021 at 9:05 AM

## 2021-03-29 NOTE — PROGRESS NOTES
Occupational Therapy   Occupational Therapy Initial Assessment  Date: 3/29/2021   Patient Name: Carol Jesus  MRN: [id number]     : 1932    Date of Service: 3/29/2021    Discharge Recommendations:  Patient would benefit from continued therapy after discharge     OT Equipment Recommendations  Equipment Needed: Yes  Mobility Devices: Vish Pettya: Rolling    Assessment   Performance deficits / Impairments: Decreased functional mobility ; Decreased ADL status; Decreased endurance;Decreased balance;Decreased high-level IADLs;Decreased safe awareness;Decreased cognition;Decreased strength;Decreased fine motor control  Prognosis: Good  Decision Making: Medium Complexity  OT Education: OT Role;Plan of Care;Precautions; ADL Adaptive Strategies;Transfer Training;Energy Conservation;Equipment(Activity Promotion, Safety with Transfers/RW Mngt; pt verbalized understanding however required min VCs for carryover)  REQUIRES OT FOLLOW UP: Yes  Activity Tolerance  Activity Tolerance: Patient limited by fatigue  Safety Devices  Safety Devices in place: Yes  Type of devices: Call light within reach; Chair alarm in place;Nurse notified; Left in chair  Restraints  Initially in place: No           Patient Diagnosis(es): The primary encounter diagnosis was Fall, initial encounter. Diagnoses of Elevated troponin and General weakness were also pertinent to this visit. has a past medical history of Arthritis, Hearing loss, Hyperlipidemia, Hypertension, Lung cancer ([address] Malignant pleural effusion, On supplemental oxygen therapy, Prostate cancer ([address] Thyroid cancer ([address] and Thyroid disease. has a past surgical history that includes Prostatectomy; Thyroidectomy; Carpal tunnel release (Bilateral); Mouth surgery; and IR INSERT TUNNELED PLEURA CATH W CUFF (10/19/2020).            Restrictions  Restrictions/Precautions  Restrictions/Precautions: Fall Risk  Required Braces or Orthoses?: No  Position Activity Restriction  Other Sitting Balance: Stand by assistance  Standing Balance: Contact guard assistance (~5 minutes total during functional mobility/toileting tasks/standing sink side for grooming tasks; pt SBA during static standing tasks with BUE support on grab bars/RW however while standing sink side with no UE support, pt with multiple minor LOB during dynamic standing tasks requiring CGA-min A to correct while reaching for faucet/soap/towel)    Functional Mobility  Functional - Mobility Device: Rolling Walker  Activity: To/from bathroom(and within hospital room/hallway)  Assist Level: Contact guard assistance  Functional Mobility Comments: SBA-CGA with use of RW; mildly unsteady, slightly impulsive requiring min VCs for safety awareness; pt required increased time/effort to perform and with complaint of fatigue following minimal exertion    Toilet Transfers  Toilet - Technique: Ambulating  Equipment Used: Standard toilet(with use of grab bars)  Toilet Transfer: Stand by assistance     ADL  Feeding: Independent  Grooming: Increased time to complete;Contact guard assistance(standing sink side to perform hand hygiene, pt with multiple LOB while standing sink side with no UE support and reaching outside [facility] for faucet/soap/towel)  UE Bathing: Increased time to complete;Contact guard assistance  LE Bathing: Increased time to complete;Minimal assistance  UE Dressing: Increased time to complete;Contact guard assistance  LE Dressing: Increased time to complete;Minimal assistance  Toileting: Increased time to complete;Minimal assistance (for brief mngt, pericare/bottom hygiene, and toilet transfer; use of grab bars, increased time/effort)  Comments: Pt requires increased time/effort and min VCs throughout due to fatigue and decreased safety awareness/cognition.       Tone RUE  RUE Tone: Normotonic  Tone LUE  LUE Tone: Normotonic  Coordination  Movements Are Fluid And Coordinated: No  Coordination and Movement description: Fine motor impairments;Decreased accuracy; Right UE;Left UE        Bed mobility  Scooting: Stand by assistance(assessed in chair)  Comment: Pt up to chair at therapist arrival and retired up to chair at therapist exit       Transfers  Sit to stand: Stand by assistance  Stand to sit: Stand by assistance  Transfer Comments: Min VCs for proper hand placement/sequencing and safety awareness with use of RW during functional transfers. Cognition  Overall Cognitive Status: Exceptions  Following Commands: Follows multistep commands with repitition; Follows multistep commands with increased time  Attention Span: Attends with cues to redirect  Safety Judgement: Decreased awareness of need for assistance  Problem Solving: Decreased awareness of errors;Assistance required to correct errors made;Assistance required to identify errors made  Insights: Decreased awareness of deficits  Initiation: Requires cues for some  Sequencing: Requires cues for some           Sensation  Overall Sensation Status: WFL(Pt denies any numbness/tingling)        LUE AROM (degrees)  LUE AROM : WFL  RUE AROM (degrees)  RUE AROM : WFL  LUE Strength  LUE Strength Comment: Grossly 4-/5  RUE Strength  RUE Strength Comment: Grossly 4-/5         Plan   Plan  Times per week: 5-6x/wk  Times per day: Daily  Current Treatment Recommendations: Strengthening, Balance Training, Functional Mobility Training, Endurance Training, Safety Education & Training, Self-Care / ADL, Equipment Evaluation, Education, & procurement, Patient/Caregiver Education & Training, Home Management Training      AM-PAC Score  [facility] Inpatient Daily Activity Raw Score: 16 (03/29/21 0859)  AM-PAC Inpatient ADL T-Scale Score : 35.96 (03/29/21 0859)  ADL Inpatient CMS 0-100% Score: 53.32 (03/29/21 0859)  ADL Inpatient CMS G-Code Modifier : CK (03/29/21 0859)    Goals  Short term goals  Time Frame for Short term goals: 14 visits  Short term goal 1: Pt will perform ADL tasks with mod IND using AE/DME PRN  Short term goal 2: Pt will perform functional transfers/functional mobility with mod IND using least restrictive AD  Short term goal 3: Pt will independently demo good safety awareness during engagement in ADLs and functional transfers/functional mobility  Short term goal 4: Pt will demo 10+ minutes standing tolerance with use of least restrictive AD for increased participation in ADLs       Therapy Time   Individual Concurrent Group Co-treatment   Time In 0826         Time Out 0851         Minutes 25         Timed Code Treatment Minutes: 8 Minutes       Jhony Music, OTR/L

## 2021-03-29 NOTE — PROGRESS NOTES
_                         Today's Date: 3/29/2021  Patient Name: Tita Boone  Date of admission: 3/27/2021 12:29 PM  Patient's age: 80 y.o., 7/2/1932  Admission Dx: Weakness [R53.1]  Sepsis [facility]) [A41.9]      Requesting Physician: Catarino Vang MD    CHIEF COMPLAINT: Generalized weakness and fatigue. Multiple falls. Consult for stage IV lung cancer. SUBJECTIVE:  . The patient was seen and examined. He feels much better today. Less weakness and fatigue. No dizziness. No fever or infections. No nausea or vomiting. No chest pain. BRIEF CASE HISTORY:    The patient is a 80 y.o.  male who is admitted to the hospital for further management of extreme weakness and fatigue and recurrent falls. Symptoms were getting worse over the last 2 to 3 days. No loss of consciousness. No seizures. No nausea or vomiting. Patient was admitted and has been managed as possible sepsis. He started feeling better. Patient is known to our practice with stage IV non-small cell lung cancer. He had malignant pleural effusion. Patient was treated with Jana Salomon but due to progression in January 2021 he was switched to different regimen. Currently he is on immunotherapy treatment with Tafinlar and Mekinist.  He tolerated the treatment fairly well with no major side effects. Past Medical History:   has a past medical history of Arthritis, Hearing loss, Hyperlipidemia, Hypertension, Lung cancer ([address] Malignant pleural effusion, On supplemental oxygen therapy, Prostate cancer ([address] Thyroid cancer ([address] and Thyroid disease. Past Surgical History:   has a past surgical history that includes Prostatectomy; Thyroidectomy; Carpal tunnel release (Bilateral); Mouth surgery; and IR INSERT TUNNELED PLEURA CATH W CUFF (10/19/2020). Family History: family history includes Diabetes in his brother; Prostate Cancer in his father.   Social History:   reports that he quit smoking about 52 years ago. His smoking use included cigarettes. He has a 20.00 pack-year smoking history. He has never used smokeless tobacco. He reports previous alcohol use. He reports that he does not use drugs. Medications:    Prior to Admission medications    Medication Sig Start Date End Date Taking?  Authorizing Provider   Dabrafenib Mesylate 75 MG CAPS Take 75 mg by mouth 2 times daily 3/26/21  Yes Paula Baires MD   Trametinib Dimethyl Sulfoxide (MEKINIST) 2 MG TABS Take 2 mg by mouth daily 3/26/21  Yes Paula Baires MD   ondansetron (ZOFRAN) 4 MG tablet Take 1 tablet by mouth 3 times daily as needed for Nausea or Vomiting 3/12/21  Yes Paula Baires MD   meloxicam (MOBIC) 15 MG tablet Take 1 tablet by mouth daily 2/15/21  Yes LO Saenz CNP   levothyroxine (SYNTHROID) 100 MCG tablet TAKE 1 TABLET BY MOUTH EVERY DAY 1/26/21  Yes LO Saenz CNP   loratadine (CLARITIN) 10 MG tablet Take 1 tablet by mouth daily 1/12/21  Yes LO Saenz CNP   simvastatin (ZOCOR) 40 MG tablet TAKE 1 TABLET BY MOUTH EVERY NIGHT 10/19/20  Yes LO Saenz CNP   Glucos-Chond-Hyal Ac-Ca Fructo (MOVE FREE JOINT HEALTH ADVANCE PO) Take 1 tablet by mouth daily   Yes Historical Provider, MD     Current Facility-Administered Medications   Medication Dose Route Frequency Provider Last Rate Last Admin    potassium chloride (KLOR-CON M) extended release tablet 40 mEq  40 mEq Oral PRN Suad Feliciano MD   40 mEq at 03/28/21 1055    Or    potassium bicarb-citric acid (EFFER-K) effervescent tablet 40 mEq  40 mEq Oral PRN Suad Feliciano MD        Or    potassium chloride 10 mEq/100 mL IVPB (Peripheral Line)  10 mEq Intravenous PRN Suad Feliciano MD        vancomycin (VANCOCIN) 1,000 mg in dextrose 5 % 250 mL IVPB  1,000 mg Intravenous Q24H Suad Feliciano MD   Stopped at 03/28/21 1948    sodium chloride flush 0.9 % injection 10 mL  10 mL Intravenous 2 times per day Erich Hays MD   10 mL at 03/29/21 0859    sodium chloride flush 0.9 % injection 10 mL  10 mL Intravenous PRN Erich Hays MD        0.9 % sodium chloride infusion  25 mL Intravenous PRN Erich Hays MD        acetaminophen (TYLENOL) tablet 650 mg  650 mg Oral Q4H PRN Erich Hays MD        0.9 % sodium chloride infusion   Intravenous Continuous Joselo Parson MD 75 mL/hr at 03/29/21 0127 New Bag at 03/29/21 0127    piperacillin-tazobactam (ZOSYN) 3,375 mg in dextrose 5 % 50 mL IVPB extended infusion (mini-bag)  3,375 mg Intravenous Q8H Joselo Parson MD 12.5 mL/hr at 03/29/21 1009 3,375 mg at 03/29/21 1009    pantoprazole (PROTONIX) injection 40 mg  40 mg Intravenous Daily Joselo Parson MD   40 mg at 03/29/21 0900    And    sodium chloride (PF) 0.9 % injection 10 mL  10 mL Intravenous Daily Joselo Parson MD   10 mL at 03/29/21 0900    ondansetron (ZOFRAN) injection 4 mg  4 mg Intravenous Q6H PRN Joselo Parson MD        zolpidem [facility]) tablet 5 mg  5 mg Oral Nightly PRN Joselo Parson MD        levothyroxine (SYNTHROID) tablet 100 mcg  100 mcg Oral Daily Joselo Parson MD   100 mcg at 03/29/21 0901    vancomycin (VANCOCIN) intermittent dosing (placeholder)   Other RX Fabricio Sanders MD        enoxaparin (LOVENOX) injection 40 mg  40 mg Subcutaneous Daily Erich Hays MD   40 mg at 03/29/21 0900       Allergies:  Patient has no known allergies. REVIEW OF SYSTEMS:      · General: Significant for weakness and fatigue. Significant for weight loss and decreased appetite. No fever or chills. · Eyes: No blurred vision, eye pain or double vision. · Ears: No hearing problems or drainage. No tinnitus. · Throat: No sore throat, problems with swallowing or dysphagia. · Respiratory: As above. · Cardiovascular: No chest pain, orthopnea or PND. No lower extremity edema. No palpitation.    · Gastrointestinal: No problems with swallowing. No abdominal pain or bloating. No nausea or vomiting. No diarrhea or constipation. No GI bleeding. · Genitourinary: No dysuria, hematuria, frequency or urgency. · Musculoskeletal: No muscle aches or pains. No limitation of movement. No back pain. No gait disturbance, No joint complaints. · Dermatologic: No skin rashes or pruritus. No skin lesions or discolorations. · Psychiatric: No depression, anxiety, or stress or signs of schizophrenia. No change in mood or affect. · Hematologic: No history of bleeding tendency. No bruises or ecchymosis. No history of clotting problems. · Infectious disease: No fever, chills or frequent infections. · Endocrine: No polydipsia or polyuria. No temperature intolerance. · Neurologic: No headaches or dizziness. Generalized weakness of the extremities. No changes in balance, coordination,  memory, mentation, behavior. · Allergic/Immunologic: No nasal congestion or hives. No repeated infections. PHYSICAL EXAM:      BP (!) 85/50   Pulse 90   Temp 97.3 °F (36.3 °C) (Oral)   Resp 18   Ht 5' 9\" (1.753 m)   Wt 141 lb 15.6 oz (64.4 kg)   SpO2 96%   BMI 20.97 kg/m²    Temp (24hrs), Av °F (36.7 °C), Min:97.3 °F (36.3 °C), Max:98.8 °F (37.1 °C)      General appearance - not in pain or distress  Mental status - alert and oriented  Eyes - pupils equal and reactive, extraocular eye movements intact  Ears - bilateral TM's and external ear canals normal  Nose - normal and patent, no erythema, discharge or polyps  Mouth - mucous membranes moist, pharynx normal without lesions  Neck - supple, no significant adenopathy  Lymphatics - no palpable lymphadenopathy, no hepatosplenomegaly  Chest -decreased air entry especially in the right side. No wheezing.     Heart - normal rate, regular rhythm, normal S1, S2, no murmurs, rubs, clicks or gallops  Abdomen - soft, nontender, nondistended, no masses or organomegaly  Neurological - alert, oriented, canal is grossly patent. There is bilateral  facet hypertrophy at multiple levels throughout the cervical spine. The  pedicles and posterior elements are otherwise intact. The prevertebral and  paravertebral soft tissues are unremarkable. The atlanto-dens interval and  dens are intact. The visualized lung apices show right apical parenchymal and  pleural soft tissue changes with loculated right pleural effusion, unchanged  from prior exam.  Vascular calcifications are seen compatible with  atherosclerotic disease. Impression: Multilevel cervical spondylosis and degenerative disc disease. Evidence of paracervical spasm. No acute bony abnormalities are noted  XR CHEST PORTABLE  Narrative: EXAMINATION:  ONE XRAY VIEW OF THE CHEST    3/27/2021 2:24 pm    COMPARISON:  10/12/2020    HISTORY:  ORDERING SYSTEM PROVIDED HISTORY: fall, tachycardia  TECHNOLOGIST PROVIDED HISTORY:  fall, tachycardia  Reason for Exam: fall, tachycardia  Acuity: Acute  Type of Exam: Initial    FINDINGS:  Moderate pleural effusion with adjacent atelectasis/airspace disease. Left  hemithorax is clear. Trachea is midline. Mild generalized osteopenia. Subtle atherosclerotic calcification of the aortic arch. Impression: Moderate right-sided pleural effusion with adjacent atelectasis/airspace  disease. CT HEAD WO CONTRAST  Narrative: EXAMINATION:  CT OF THE HEAD WITHOUT CONTRAST  3/27/2021 11:13 am    TECHNIQUE:  CT of the head was performed without the administration of intravenous  contrast. Dose modulation, iterative reconstruction, and/or weight based  adjustment of the mA/kV was utilized to reduce the radiation dose to as low  as reasonably achievable. COMPARISON:  None. HISTORY:  ORDERING SYSTEM PROVIDED HISTORY: fall  TECHNOLOGIST PROVIDED HISTORY:  fall    Decision Support Exception->Emergency Medical Condition (MA)  Reason for Exam: generalized weakness and falls over the last 2 days.   Fall  this morning  Acuity: Acute  Type of Exam: Initial    FINDINGS:  BRAIN/VENTRICLES:    The cerebral hemispheres, brainstem, and cerebellum have a normal appearance  for the patient's age. The falx is midline. The ventricles and peripheral  sulci are mildly dilated. There is decreased attenuation in the  periventricular white matter. There is no sign of a space occupying lesion,  infarction, or hemorrhage. Orbits: Portion of the orbits demonstrate no acute abnormality. SINUSES: The  imaged portions of the paranasal sinuses are clear. The  mastoids and the middle ear chambers are clear. SOFT TISSUES/SKULL:  No acute abnormality of the visualized skull or soft  tissues. Vascular calcifications are seen compatible with atherosclerotic  disease. Impression: [  Mild central and cortical cerebral atrophy. Mild chronic deep white matter ischemic changes    No acute intracranial abnormalities are noted. IMPRESSION:    Primary Problem  General weakness    Active Hospital Problems    Diagnosis Date Noted    Sepsis ([address] [A41.9] 03/28/2021    Fall [K72. XXXA]     Malignant pleural effusion [J91.0]     General weakness [R53.1] 03/27/2021    Primary adenocarcinoma of right lung ([address] [C34.91] 10/15/2020   Excessive weakness and fatigue. Stage IV lung adenocarcinoma. Malignant pleural effusion. RECOMMENDATIONS:  1. Patient was admitted due to the above-mentioned problems and he on antibiotics and fluids. He is already feeling better. Continue to drain fluid from the right pleural effusion which will help his breathing. 2. Overall condition may be deteriorating performance and he may need placement. 3. As for the lung cancer is concerned, patient has stage IV disease and he received Keytruda in the past.  Currently on immunotherapy with Tafinlar and Mekinist.  No significant side effects. Patient is still interested in continuing active therapy. Treatment can be resumed after discharge. 4. Monitor labs.   5. Patient's questions were answered to the best of his satisfaction and he verbalized full understanding and agreement. MD Charissa Shipley Reading                          [facility]                            This note is created with the assistance of a speech recognition program.  While intending to generate a document that actually reflects the content of the visit, the document can still have some errors including those of syntax and sound a like substitutions which may escape proof reading. It such instances, actual meaning can be extrapolated by contextual diversion.

## 2021-03-29 NOTE — CARE COORDINATION
Case Management Initial Discharge Plan  Casi Shahid,             Met with:patient to discuss discharge plans. Information verified: address, contacts, phone number, , insurance Yes    Emergency Contact/Next of Kin name & number: Margarita Reyes - [phone]    PCP: LO Burch CNP  Date of last visit: past year    Insurance Provider: [facility] Medicare    Discharge Planning    Living Arrangements:  Spouse/Significant Other   Support Systems:  Spouse/Significant Other, Children, Family Members, Friends/Neighbors    Home has 1 stories - independent living at [facility]  0 stairs to climb to get into front door, stairs to climb to reach second floor  Location of bedroom/bathroom in home     Patient able to perform ADL's:Independent    Current Services (outpatient & in home) none  DME equipment:   DME provider:     Receiving oral anticoagulation therapy? No    If indicated:   Physician managing anticoagulation treatment:   Where does patient obtain lab work for ATC treatment? Potential Assistance Needed:  Outpatient PT/OT, [facility]    Patient agreeable to home care: No  [place] of choice provided:  no    Prior SNF/Rehab Placement and Facility: no  Agreeable to SNF/Rehab: No  [place] of choice provided: no     Evaluation: no    Expected Discharge date:  21    Patient expects to be discharged to:  skilled? Follow Up Appointment: Best Day/ Time: Monday AM    Transportation provider: self  Transportation arrangements needed for discharge: Yes    Readmission Risk              Risk of Unplanned Readmission:        12             Does patient have a readmission risk score greater than 14?: No  If yes, follow-up appointment must be made within 7 days of discharge.      Goals of Care: get stronger      Discharge Plan: would like to go home - wife is in [facility] - he and daughter would like referral to [facility] SNF - sent Electronically signed by Lyla Ryan RN on 3/29/21 at 4:21 PM EDT

## 2021-03-29 NOTE — PLAN OF CARE
Nutrition Problem #1: Moderate malnutrition, In context of chronic illness  Intervention: Food and/or Nutrient Delivery: Continue Current Diet, Continue Oral Nutrition Supplement  Nutritional Goals: PO to meet >75% of needs

## 2021-03-29 NOTE — PROGRESS NOTES
Fluid Requirements:  1 ml/kcal      Nutrition Related Findings:  loss of taste/smell, dysphagia, poor appetite, early satiety, muscle/fat loss, immunotherapy      Wounds:  None       Current Nutrition Therapies:    DIET GENERAL;  Dietary Nutrition Supplements: Standard High Calorie Oral Supplement    Anthropometric Measures:  · Height: 5' 9\" (175.3 cm)  · Current Body Weight: 141 lb (64 kg)   · Admission Body Weight: 135 lb (61.2 kg)    · Usual Body Weight: 165 lb (74.8 kg)(12/20)     · Ideal Body Weight: 160 lbs; % Ideal Body Weight 88.1 %   · BMI: 20.8  · Adjusted Body Weight:  ; No Adjustment   · BMI Categories: Underweight (BMI less than 22) age over 72       Nutrition Diagnosis:   · Moderate malnutrition, In context of chronic illness related to catabolic illness as evidenced by weight loss 7.5% in 3 months, moderate loss of subcutaneous fat, moderate muscle loss    · Inadequate oral intake related to swallowing difficulty, altered taste perception, early satiety as evidenced by intake 26-50%, BMI, poor intake prior to admission      Nutrition Interventions:   Food and/or Nutrient Delivery:  Continue Current Diet, Continue Oral Nutrition Supplement  Nutrition Education/Counseling:  Education completed   Coordination of Nutrition Care:  Continue to monitor while inpatient, Interdisciplinary Rounds    Goals:  PO to meet >75% of needs       Nutrition Monitoring and Evaluation:   Behavioral-Environmental Outcomes:  None Identified   Food/Nutrient Intake Outcomes:  Food and Nutrient Intake, Supplement Intake  Physical Signs/Symptoms Outcomes:  Chewing or Swallowing, Nutrition Focused Physical Findings, Skin, Weight     Discharge Planning:    Continue Oral Nutrition Supplement, Continue current diet     Electronically signed by Celina Hurtado RD, NICHOLAS on 3/29/21 at 10:40 AM EDT    Celina Hurtado RD,NICHOLAS  Clinical Dietitian  [facility]  [phone]

## 2021-03-29 NOTE — DISCHARGE INSTR - COC
Continuity of Care Form    Patient Name: Ana Paris   :  1932  MRN:  [id number]    [facility] date:  3/27/2021  Discharge date:  2021    Code Status Order: DNR-CCA   Advance Directives:   [place] Directive Type of Healthcare Directive Copy in [address] Agent's Name Healthcare Agent's Phone Number    [phone]  Yes, patient has an advance directive for healthcare treatment  Durable power of  for health care;Living will  Yes, copy in chart  Healthcare power of   Skylar Garcia  [phone]            Admitting Physician:  Isaura Sepulveda MD  PCP: LO Gupta CNP    Discharging Nurse: Irving Lozano Unit/Room#: 321/321-01  Discharging Unit Phone Number: [phone]    Emergency Contact:   Extended Emergency Contact Information  Primary Emergency Contact: [address] Phone: [phone]  Mobile Phone: [phone]  Relation: Child    Past Surgical History:  Past Surgical History:   Procedure Laterality Date    CARPAL TUNNEL RELEASE Bilateral     IR INSERT TUNNELED PLEURA CATH W CUFF  10/19/2020    IR INSERT TUNNELED PLEURA CATH W CUFF 10/19/2020 Bonifacio Celeste MD [facility] 8      dental repairs    PROSTATECTOMY      THYROIDECTOMY         Immunization History:   Immunization History   Administered Date(s) Administered    COVID-19, [place], PF, 30mcg/0.3mL 2021, 2021    Influenza Virus Vaccine 2020    Influenza, High Dose (Fluzone 65 yrs and older) 2017, 2018, 10/15/2018, 10/01/2019    Influenza, Triv, inactivated, subunit, adjuvanted, IM (Fluad 65 yrs and older) 10/01/2019    Pneumococcal Conjugate 13-valent ([id number]) 2018    Pneumococcal Polysaccharide ([id number]) 10/10/2018    Zoster Recombinant (Shingrix) 2019       Active Problems:  Patient Active Problem List   Diagnosis Code    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 XXXA    Malignant pleural effusion J91.0       Isolation/Infection:   Isolation            No Isolation          Patient Infection Status       Infection Onset Added Last Indicated Last Indicated By Review Planned Expiration Resolved Resolved By    None active    Resolved    COVID-19 Rule Out 10/15/20 10/15/20 10/16/20 COVID-19 (Ordered)   10/16/20 Rule-Out Test Resulted    COVID-19 Rule Out 09/30/20 09/30/20 09/30/20 COVID-19 (Ordered)   09/30/20 Rule-Out Test Resulted            Nurse Assessment:  Last Vital Signs: BP (!) 101/59   Pulse 94   Temp 98 °F (36.7 °C) (Oral)   Resp 16   Ht 5' 9\" (1.753 m)   Wt 141 lb 15.6 oz (64.4 kg)   SpO2 97%   BMI 20.97 kg/m²     Last documented pain score (0-10 scale): Pain Level: 0  Last Weight:   Wt Readings from Last 1 Encounters:   03/28/21 141 lb 15.6 oz (64.4 kg)     Mental Status:  oriented    IV Access:  - None    Nursing Mobility/ADLs:  Walking   Independent  Transfer  Independent  Bathing  Independent  Dressing  Independent  [address]  Independent  Med Delivery   whole    Wound Care Documentation and Therapy:        Elimination:  Continence:   · Bowel:  Yes  · Bladder: Yes  Urinary Catheter: None   Colostomy/Ileostomy/Ileal Conduit: No       Date of Last BM: 03/30/2021    Intake/Output Summary (Last 24 hours) at 3/29/2021 0952  Last data filed at 3/29/2021 0918  Gross per 24 hour   Intake 400 ml   Output 950 ml   Net -550 ml     I/O last 3 completed shifts: In: 200 [P.O.:200]  Out: 700 [Urine:700]    Safety Concerns:     History of Falls (last 30 days)    Impairments/Disabilities:      weakness, history of falls    Nutrition Therapy:  Current Nutrition Therapy:   - Oral Diet:  General    Routes of Feeding: Oral  Liquids: No Restrictions  Daily Fluid Restriction: no  Last Modified Barium Swallow with Video (Video Swallowing Test): not done    Treatments at the Time of Hospital Discharge:   Respiratory Treatments: none  Oxygen Therapy:  is not on home oxygen therapy. Ventilator:    - No ventilator support    Rehab Therapies: Physical Therapy and Occupational Therapy  Weight Bearing Status/Restrictions: No weight bearing restirctions  Other Medical Equipment (for information only, NOT a DME order):  cane  Other Treatments: ***    Patient's personal belongings (please select all that are sent with patient):  all belongings sent with patient and family. RN SIGNATURE:  Electronically signed by Angélica Lara RN on 3/30/21 at 4:20 PM EDT    CASE MANAGEMENT/SOCIAL WORK SECTION    Inpatient Status Date: 03/28/2021    Readmission Risk Assessment Score:  Readmission Risk              Risk of Unplanned Readmission:        12           Discharging to Facility/ Agency   · [facility]: [facility]  · Address:  · Phone: [phone]  · Fax:    Dialysis Facility (if applicable)   · Name:  · Address:  · Dialysis Schedule:  · Phone:  · Fax:    / signature: Electronically signed by Urbano Jensen RN on 3/30/21 at 3:09 PM EDT    PHYSICIAN SECTION    Prognosis: Fair    Condition at Discharge: Stable    Rehab Potential (if transferring to Rehab):  Fair    Recommended Labs or Other Treatments After Discharge: BMP, CBC on admission and weekly x3, skilled nursing assessment, PT, OT    Physician Certification: I certify the above information and transfer of Jil Vee  is necessary for the continuing treatment of the diagnosis listed and that he requires [facility] for less 30 days.      Update Admission H&P: No change in H&P    PHYSICIAN SIGNATURE:  Electronically signed by Anthony Beckman MD on 3/29/21 at 9:53 AM EDT

## 2021-03-29 NOTE — PROGRESS NOTES
Physical Therapy    Facility/Department: [facility] SURG ICU  Initial Assessment    NAME: Carlo Schaefer  : 1932  MRN: [id number]    Date of Service: 3/29/2021  Chief Complaint   Patient presents with    Fall     weakness yesterday-denies striking head-No LOC/No anticoags      Discharge Recommendations:  Patient would benefit from continued therapy after discharge   PT Equipment Recommendations  Equipment Needed: Yes  Mobility Devices: Louise [place]: Rolling    Assessment   Body structures, Functions, Activity limitations: Decreased functional mobility ; Decreased posture;Decreased endurance;Decreased safe awareness;Decreased strength;Decreased balance  Assessment: Pt with mobility deficits requiring CGA to ambulate 90 feet with a RW and up to min A for standing balance to prevent falls. Pt at a high risk for falls secondary to decreased dynamic standing balance, decreased endurance, and decreased safety awareness. Pt would benefit from additinal therapy upon discharge. Pt would be unsafe to return to prior living arrangements. Prognosis: Good  Decision Making: Medium Complexity  PT Education: Gait Training;General Safety; Adaptive Device Training;Functional Mobility Training;PT Role  REQUIRES PT FOLLOW UP: Yes  Activity Tolerance  Activity Tolerance: Patient limited by endurance; Patient limited by fatigue       Patient Diagnosis(es): The primary encounter diagnosis was Fall, initial encounter. Diagnoses of Elevated troponin and General weakness were also pertinent to this visit. has a past medical history of Arthritis, Hearing loss, Hyperlipidemia, Hypertension, Lung cancer ([address] Malignant pleural effusion, On supplemental oxygen therapy, Prostate cancer ([address] Thyroid cancer ([address] and Thyroid disease. has a past surgical history that includes Prostatectomy; Thyroidectomy; Carpal tunnel release (Bilateral); Mouth surgery; and IR INSERT TUNNELED PLEURA CATH W CUFF (10/19/2020). Restrictions  Restrictions/Precautions  Restrictions/Precautions: Fall Risk  Required Braces or Orthoses?: No  Position Activity Restriction  Other position/activity restrictions: Up with assistance, activity as tolerated.   Vision/Hearing  Vision: Within Functional Limits  Hearing: Exceptions to [facility]  Hearing Exceptions: Hard of hearing/hearing concerns     Subjective  General  Patient assessed for rehabilitation services?: Yes  Response To Previous Treatment: Not applicable  Family / Caregiver Present: No  Follows Commands: Within Functional Limits  Subjective  Subjective: Pt up to a chair this morning and agreeable to therapy, pt denies pain at a baseline  Pain Screening  Patient Currently in Pain: Denies  Vital Signs  Patient Currently in Pain: Denies       Orientation  Orientation  Overall Orientation Status: Within Functional Limits  Social/Functional History  Social/Functional History  Lives With: Spouse  Type of Home: House([place] at a senior living facility)  Home Layout: One level  Home Access: Level entry  Bathroom Shower/Tub: Walk-in shower  Bathroom Toilet: Standard  Bathroom Equipment: Grab bars in shower, Shower chair  Bathroom Accessibility: Accessible  Home Equipment: [employer] Help From: Family(Pt reports supportive children)  ADL Assistance: Independent  Homemaking Assistance: Needs assistance  Homemaking Responsibilities: Yes(pt reports children recently providing increased assistance with IADLs)  Meal Prep Responsibility: Secondary(pt reports simple meal prep only)  Laundry Responsibility: Primary  Cleaning Responsibility: Secondary(pt reports a cleaning service performs high level cleaning tasks 2x/month)  Shopping Responsibility: No  Health Care Management: Primary  Ambulation Assistance: Independent(with use of cane per pt report however pt reports recent frequent falls)  Transfer Assistance: Independent  Active : Yes  Mode of Transportation: Car  Occupation: Retired  Type of occupation: 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[address] [place] [address]: Enjoys watching TV- game shows and the news  Cognition   Cognition  Overall Cognitive Status: Exceptions  Following Commands: Follows multistep commands with repitition; Follows multistep commands with increased time  Attention Span: Attends with cues to redirect  Safety Judgement: Decreased awareness of need for assistance  Problem Solving: Decreased awareness of errors;Assistance required to correct errors made;Assistance required to identify errors made  Insights: Decreased awareness of deficits  Initiation: Requires cues for some  Sequencing: Requires cues for some    Objective          AROM RLE (degrees)  RLE AROM: WFL  AROM LLE (degrees)  LLE AROM : WFL  AROM RUE (degrees)  RUE AROM : WFL  AROM LUE (degrees)  LUE AROM : WFL  Strength RLE  Strength RLE: WFL  Comment: Grossly 4-/5 based on functional movement  Strength LLE  Strength LLE: WFL  Comment: Grossly 4-/5 based on functional movement  Strength RUE  Comment: Co-evaluation with OT, see OT note for UE detail  Strength LUE  Comment: Co-evaluation with OT, see OT note for UE detail     Sensation  Overall Sensation Status: WFL(Pt denies any numbness/tingling)  Bed mobility  Scooting: Stand by assistance(assessed in chair)  Comment: Pt began today's session up in a chair, retired up to a chair at the conclusion of today's session. Transfers  Sit to Stand: Stand by assistance  Stand to sit: Stand by assistance  Ambulation  Ambulation?: Yes  More Ambulation?: No  Ambulation 1  Surface: level tile  Device: Rolling Walker  Assistance: Contact guard assistance  Quality of Gait: Pt with decreased gait speed, decreased step length bilateral, reliance on RW for balance.   Distance: 90 feet  Comments: Pt requires verbal cues to maintain safe proximity to RW while turning  Stairs/Curb  Stairs?: No     Balance  Posture: Fair  Sitting - Static: Good;-  Sitting - Dynamic: evaluation/treatment and documentation.

## 2021-03-30 NOTE — ADT AUTH CERT
Utilization Reviews       Systemic or Infectious Condition GRG - Care Day 4 (3/30/2021) by Giancarlo Caal RN       Review Status Review Entered   Completed 3/30/2021 13:46      Criteria Review      Care Day: 4 Care Date: 3/30/2021 Level of Care: Inpatient Floor    Guideline Day 3    Level Of Care    ( ) * Activity level acceptable    3/30/2021 1:46 PM EDT by Maira River      pt 90 ft cga-- for snf pmt    (X) Floor to discharge    3/30/2021 1:46 PM EDT by Maira River      gmf    Clinical Status    (X) * Hemodynamic stability    (X) * Respiratory status acceptable    (X) * Neurologic status acceptable    (X) * Temperature status acceptable    (X) * No infection, or status acceptable    ( ) * No neutropenia, or status acceptable    3/30/2021 1:46 PM EDT by Maira River      wbc 3.1    (X) * Special infection or injury situations absent    ( ) * Electrolyte status acceptable    3/30/2021 1:46 PM EDT by Maira River      k 3.2    ( ) * General Discharge Criteria met    Interventions    ( ) * Intake acceptable    ( ) * No inpatient interventions needed    3/30/2021 1:46 PM EDT by Maira River      lovenox 30 mg sq qd-iv protonix 40mg qd-- iv zosyn 3.375mg tid  iv vancocin 1000 mg iv qd-- iv ns at 75 hr    * Milestone   Additional Notes   3/30    gmf   Continue orders   lovenox 30 mg sq qd-iv protonix 40mg qd-- iv zosyn 3.375mg tid   iv vancocin 1000 mg iv qd-- iv ns at 75 hr   O2 prn oximetry   Pt/ot- regular diet   Klor con 40 meq x1         Labs   Basic Metabolic Panel [[id number]] (Abnormal) Collected: 03/30/21 1125     Specimen: Blood Updated: 03/30/21 1150     Glucose 114 mg/dL      BUN 9 mg/dL      CREATININE 0.50 mg/dL      Bun/Cre Ratio NOT REPORTED     Calcium 7.3 mg/dL      Sodium 137 mmol/L      Potassium 3.2 mmol/L      Chloride 106 mmol/L      CO2 23 mmol/L      Anion Gap 8 mmol/L      GFR Non-African American >60 mL/min      GFR African American >60 mL/min      GFR Comment          GFR Staging -decreased air entry especially in the right side. IMPRESSION:     Primary Problem   General weakness       Active Hospital Problems     Diagnosis Date Noted   · Sepsis ([facility] [A41.9] 03/28/2021   · Fall [W19. XXXA]     · Malignant pleural effusion [J91.0]     · General weakness [R53.1] 03/27/2021   · Primary adenocarcinoma of right lung (HCC) [C34.91] 10/15/2020   Excessive weakness and fatigue. Stage IV lung adenocarcinoma. Malignant pleural effusion.       RECOMMENDATIONS:   1. The patient condition seems to be slowly improving.  Continue supportive care   2. Agree that the patient needs placement as his overall  performance status seems to be declining   3. As for the lung cancer is concerned, patient has stage IV disease and he received Keytruda in the past.  Currently on immunotherapy with Tafinlar and [address] patient is interested to continue long therapy. [facility] has been on the medicine in less than a month.  We will plan to continue that for a couple more months and check for response. 4. The patient is having difficulty with some of the pills specially potassium.  We will probably give him IV potassium replacement.  I will give the effervescent potassium   5. Monitor labs.    6. Patient's questions were answered to the best of his satisfaction and he verbalized full understanding and agreement.                  Systemic or Infectious Condition GRG - Care Day 3 (3/29/2021) by Roberto Landin RN       Review Status Review Entered   Completed 3/30/2021 13:38      Criteria Review      Care Day: 3 Care Date: 3/29/2021 Level of Care: Inpatient Floor    Guideline Day 2    Level Of Care    ( ) Floor    3/30/2021 1:38 PM EDT by Fantasma Champion      med surg    Clinical Status    (X) * No ICU or intermediate care needs    Interventions    (X) Inpatient interventions continue    3/30/2021 1:38 PM EDT by Fantasma Champion      lovenox 30 mg sq qd-iv protonix 40mg qd-- iv zosyn 3.375mg tid  iv vancocin 1000 mg iv qd-- iv ns at 75 hr    * Milestone   Additional Notes   3/29 gmf   Continue orders   lovenox 30 mg sq qd-iv protonix 40mg qd-- iv zosyn 3.375mg tid   iv vancocin 1000 mg iv qd-- iv ns at 75 hr   O2 prn oximetry   Pt/ot- regular diet      03/29/21 0915  98 (36.7)  16  94  101/59  -  Sitting;Up in chair  -  -  97  None (Room air)         Labs   CBC Auto Differential [[id number]] (Abnormal) Collected: 03/29/21 0545      Specimen: Blood Updated: 03/29/21 0629     WBC 3.1 k/uL      RBC 3.14 m/uL      Hemoglobin 8.7 g/dL      Hematocrit 27.6 %      MCV 87.9 fL      MCH 27.7 pg      MCHC 31.6 g/dL      RDW 14.7 %      Platelets 886 k/uL      MPV 9.1 fL      NRBC Automated NOT REPORTED per 100 WBC      Differential Type NOT REPORTED     Immature Granulocytes NOT REPORTED %      Absolute Immature Granulocyte NOT REPORTED k/uL      WBC Morphology NOT REPORTED     RBC Morphology NOT REPORTED     Platelet Estimate NOT REPORTED     Seg Neutrophils 78 %      Lymphocytes 15 %      Monocytes 7 %      Eosinophils % 0 %      Basophils 0 %      Segs Absolute 2.41 k/uL      Absolute Lymph # 0.47 k/uL      Absolute Mono # 0.22 k/uL      Absolute Eos # 0.00 k/uL      Basophils Absolute 0.00 k/uL      Morphology Normal     BASIC METABOLIC PANEL [[id number]] (Abnormal) Collected: 03/29/21 0545     Specimen: Blood Updated: 03/29/21 0610     Glucose 102 mg/dL      BUN 16 mg/dL      CREATININE 0.56 mg/dL      Bun/Cre Ratio NOT REPORTED     Calcium 7.1 mg/dL      Sodium 135 mmol/L      Potassium 3.3 mmol/L      Chloride 106 mmol/L      CO2 22 mmol/L      Anion Gap 7 mmol/L      GFR Non-African American >60 mL/min      GFR African American >60 mL/min      GFR Comment          GFR Staging NOT REPORTED      Inter med      Subjective:   Admit Date: 3/27/2021                        PCP: LO Love CNP   CC: weakness   Interval History: pt states he thinks he feels a bit better.      Diet: DIET GENERAL;   Dietary Nutrition Supplements: Standard High Calorie Oral Supplement      General appearance: alert and cooperative with exam   Neck: supple, symmetrical, trachea midline   Lungs: clear to auscultation bilaterally   Heart: regular rate and rhythm, S1, S2 normal, no murmur, click, rub or gallop   Abdomen: soft, non-tender; bowel sounds normal; no masses,  no organomegaly   Extremities: extremities normal, atraumatic, no cyanosis or edema   Neurologic: Mental status: Alert, oriented, thought content appropriate       Assessment and Plan:   1. Generalized weakness and multiple falls- most likely due to dehydration. - therapy-- work on placement. 2. Lung cancer- probably needs skilled. 3. Anemia- since hydrated- continue to push po        Patient Active Problem List:      Postoperative hypothyroidism      Essential hypertension      Hyperlipidemia      Osteoarthritis of both hips      H/O head and neck radiation      History of prostate cancer      Actinic keratosis      Neoplasm of uncertain behavior of skin      Pleural effusion      History of malignant neoplasm of thyroid      Lung mass      Dyspnea      Primary adenocarcinoma of right lung (HCC)      Hypercalcemia      Osteoarthrosis      Moderate protein-calorie malnutrition (HCC)      General weakness      Sepsis ([address]      Fall      Malignant pleural effusion         Pt notes   More Ambulation?: No   Ambulation 1   Surface: level tile   Device: Rolling Walker   Assistance: Contact guard assistance   Quality of Gait: Pt with decreased gait speed, decreased step length bilateral, reliance on RW for balance. Distance: 90 feet   Comments: Pt requires verbal cues to maintain safe proximity to RW while turning         Hem onc       IMPRESSION:     Primary Problem   General weakness       Active Hospital Problems     Diagnosis Date Noted   · Sepsis ([address] [A41.9] 03/28/2021   · Fall [W19. XXXA]     · Malignant pleural effusion [J91.0]     · General weakness [R53.1] 03/27/2021   · Primary adenocarcinoma of right lung ([facility] [C34.91] 10/15/2020   Excessive weakness and fatigue. Stage IV lung adenocarcinoma. Malignant pleural effusion.       RECOMMENDATIONS:   1. Patient was admitted due to the above-mentioned problems and he on antibiotics and fluids.  He is already feeling better.  Continue to drain fluid from the right pleural effusion which will help his breathing. 2. Overall condition may be deteriorating performance and he may need placement. 3. As for the lung cancer is concerned, patient has stage IV disease and he received Keytruda in the past.  Currently on immunotherapy with Tafinlar and Mekinist.  No significant side effects.  Patient is still interested in continuing active therapy.  Treatment can be resumed after discharge. 4. Monitor labs.    5. Patient's questions were answered to the best of his satisfaction and he verbalized full understanding and agreement.

## 2021-03-30 NOTE — PROGRESS NOTES
Physical Therapy  Facility/Department: [facility] MED SURG ICU  Daily Treatment Note  NAME: Francesco Benavidez  : 1932  MRN: [id number]    Date of Service: 3/30/2021    Discharge Recommendations:  Patient would benefit from continued therapy after discharge   PT Equipment Recommendations  Equipment Needed: Yes  Mobility Devices: [place]: Rolling    Assessment   Body structures, Functions, Activity limitations: Decreased functional mobility ; Decreased posture;Decreased endurance;Decreased safe awareness;Decreased strength;Decreased balance  Assessment: Pt with improved balance and mobility this date. Pt with mobility deficits requiring SBA and a RW to ambulate 90 feet. Pt will require a RW and 24 hour supervision upon discharge and would be unsafe to return to prior living arrangements without them. Pt would benefit from additional therapy to increase balance, decrease fall risk, and improve independence with mobility. Prognosis: Good  PT Education: Gait Training;General Safety; Adaptive Device Training;Functional Mobility Training;PT Role  REQUIRES PT FOLLOW UP: Yes  Activity Tolerance  Activity Tolerance: Patient limited by endurance; Patient limited by fatigue  Activity Tolerance: Today's session performed on room air, pt's oxygen saturation was 98% in sitting at the beginning of therapy and dropped to 90% at the conclusion of ambulation bouts but quickly recovered to 96% upon sitting. Pt with slightly increased work of breathing toward the conclusion of exercises and ambulation bouts. Patient Diagnosis(es): The primary encounter diagnosis was Fall, initial encounter. Diagnoses of Elevated troponin and General weakness were also pertinent to this visit. has a past medical history of Arthritis, Hearing loss, Hyperlipidemia, Hypertension, Lung cancer ([address] Malignant pleural effusion, On supplemental oxygen therapy, Prostate cancer ([address] Thyroid cancer ([address] and Thyroid disease.    has a past surgical history that includes Prostatectomy; Thyroidectomy; Carpal tunnel release (Bilateral); Mouth surgery; and IR INSERT TUNNELED PLEURA CATH W CUFF (10/19/2020). Restrictions  Restrictions/Precautions  Restrictions/Precautions: Fall Risk  Required Braces or Orthoses?: No  Position Activity Restriction  Other position/activity restrictions: Up with assistance, activity as tolerated. Subjective   General  Response To Previous Treatment: Patient with no complaints from previous session. Family / Caregiver Present: Yes(daughter)  Subjective  Subjective: Pt supine in bed and agreeable to therapy this morning, RN agreeable to therapy. Pain Screening  Patient Currently in Pain: Denies  Vital Signs  Patient Currently in Pain: Denies       Orientation  Orientation  Overall Orientation Status: Within Functional Limits  Cognition   Cognition  Attention Span: Attends with cues to redirect  Safety Judgement: Decreased awareness of need for assistance  Objective   Bed mobility  Supine to Sit: Stand by assistance  Sit to Supine: (pt retired up to a chair at the conclusion of today's session.)  Scooting: Stand by assistance  Comment: Bed mobility performed with the [facility] elevated ~40 degrees. Transfers  Sit to Stand: Stand by assistance  Stand to sit: Stand by assistance  Ambulation  Ambulation?: Yes  More Ambulation?: Yes  Ambulation 1  Surface: level tile  Device: Rolling Walker  Assistance: Stand by assistance  Quality of Gait: Pt with slightly decreased gait speed, increased stability this date compared to previous session, minimal path deviation. Gait Deviations: Slow Angie;Decreased step length  Distance: 90 feet.   Ambulation 2  Surface - 2: level tile  Device 2: Single point cane  Assistance 2: Contact guard assistance  Quality of Gait 2: Step-to gait pattern, decreased gait speed, decreased step length, decreased toe-off bilateral.  Gait Deviations: Slow Angie;Decreased step length;Decreased step height Distance: 90 feet  Stairs/Curb  Stairs?: Yes  Stairs  # Steps : 2  Rails: None  Curbs: 6\"  Device: Rolling walker  Assistance: Contact guard assistance  Comment: Pt without loss of balance while performing curb negotiation. Balance  Posture: Fair  Sitting - Static: Good;-  Sitting - Dynamic: Good;-  Standing - Static: Fair;+  Standing - Dynamic: Fair  Comments: standing balance assessed with a RW. Other exercises  Other exercises?: Yes  Other exercises 1: static balance with feet together, 30 seconds eyes open, 30 seconds eyes closed without UE support, without loss of balance  Other exercises 2: static balance toe to in-step, 30 seconds eyes open, 30 seconds eyes closed without UE support, without 1x loss of balance requiring UE support  Other exercises 3: static balance toe to heel, 30 seconds eyes open with 3x loss of balance requiring UE support. Goals  Short term goals  Time Frame for Short term goals: 14 visits  Short term goal 1: Pt will ambulate 250 feet a RW and supervision to increase functional independence. Short term goal 2: Pt will demonstrate good- standing balance to decrease fall risk. Short term goal 3: Pt will tolerate a 35 minute therapy session to increase endurance. Short term goal 4: Pt will perform sit<>stand, stand<>sit transfers with supervision to increase functional independence. Short term goal 5: Pt will perform bed mobility with SBA to increase functional independence.     Plan    Plan  Times per week: 5-6  Times per day: Daily  Current Treatment Recommendations: Strengthening, Transfer Training, Endurance Training, Balance Training, Gait Training, Functional Mobility Training, Stair training, Safety Education & Training, Home Exercise Program, Patient/Caregiver Education & Training  Safety Devices  Type of devices: Left in chair, Call light within reach, Chair alarm in place, Gait belt  Restraints  Initially in place: No     Therapy Time   Individual Concurrent Group Co-treatment   Time In 3144         Time Out 1135         Minutes 39         Timed Code Treatment Minutes: 35 Minutes       Phoebe Parcel, SPT  Evaluation/treatment performed by Student PT under the supervision of co-signing PT who agrees with all evaluation/treatment and documentation.

## 2021-03-30 NOTE — PLAN OF CARE
Problem: Falls - Risk of:  Goal: Will remain free from falls  Description: Will remain free from falls  Outcome: Ongoing  Goal: Absence of physical injury  Description: Absence of physical injury  Outcome: Ongoing   Fall assessment preformed. Bed in low locked position with call light and tray table within reach. Education given. Will continue to monitor. Problem: Nutrition  Goal: Optimal nutrition therapy  Outcome: Ongoing   Nutritional supplement given. Problem: Musculor/Skeletal Functional Status  Goal: Highest potential functional level  Outcome: Ongoing  Goal: Absence of falls  Outcome: Ongoing   Bed alarm on.  Ambulate with patient

## 2021-03-30 NOTE — PROGRESS NOTES
Patient discharged to home in stable condition. IV removed intact and without difficulty. All belongings sent with patient. All questions answered.

## 2021-03-30 NOTE — PROGRESS NOTES
Progress Note  3/30/2021 10:39 AM  Subjective:   Admit Date: 3/27/2021  PCP: LO Lafleur CNP  CC: weakness  Interval History: pt states much better today. Wants to go for another walk this morning. Diet: DIET GENERAL;  Dietary Nutrition Supplements: Standard High Calorie Oral Supplement  Medications:   Scheduled Meds:   vancomycin  1,000 mg Intravenous Q24H    sodium chloride flush  10 mL Intravenous 2 times per day    piperacillin-tazobactam  3,375 mg Intravenous Q8H    pantoprazole  40 mg Intravenous Daily    And    sodium chloride (PF)  10 mL Intravenous Daily    levothyroxine  100 mcg Oral Daily    vancomycin (VANCOCIN) intermittent dosing (placeholder)   Other RX Placeholder    enoxaparin  40 mg Subcutaneous Daily     Continuous Infusions:   sodium chloride      sodium chloride 75 mL/hr at 03/29/21 0127     CBC:   Recent Labs     03/27/21  1320 03/28/21  0532 03/29/21  0545   WBC 5.8 3.6 3.1*   HGB 12.8* 9.0* 8.7*   PLT 99* 80* 101*     BMP:    Recent Labs     03/27/21  1320 03/28/21  0532 03/29/21  0545   * 134* 135   K 3.9 3.4* 3.3*   CL 97* 105 106   CO2 18* 23 22   BUN 26* 24* 16   CREATININE 1.12 0.75 0.56*   GLUCOSE 153* 105* 102*     Hepatic:   Recent Labs     03/27/21  1320   AST 90*   ALT 15   BILITOT 0.72   ALKPHOS 93     Troponin: No results for input(s): TROPONINI in the last 72 hours. BNP: No results for input(s): BNP in the last 72 hours. Lipids: No results for input(s): CHOL, HDL in the last 72 hours.     Invalid input(s): LDLCALCU  INR:   Recent Labs     03/27/21  1320   INR 1.2       Objective:   Vitals: /71   Pulse 85   Temp 98.3 °F (36.8 °C) (Oral)   Resp 18   Ht 5' 9\" (1.753 m)   Wt 142 lb 6.7 oz (64.6 kg)   SpO2 96%   BMI 21.03 kg/m²   General appearance: alert and cooperative with exam  Neck: supple, symmetrical, trachea midline  Lungs: clear to auscultation bilaterally  Heart: regular rate and rhythm, S1, S2 normal, no murmur, click, rub or gallop  Abdomen: normal findings: soft, non-tender  Extremities: extremities normal, atraumatic, no cyanosis or edema  Neurologic: Mental status: Alert, oriented, thought content appropriate    Assessment and Plan:   1. Weakness- most likely due to dehydration- will see how therapy does today and decide on need for skilled care at [facility] vs back to his apt in [place]. 2. Anemia- due to chronic disease  3. Lung cancer- continue home meds.      Patient Active Problem List:     Postoperative hypothyroidism     Essential hypertension     Hyperlipidemia     Osteoarthritis of both hips     H/O head and neck radiation     History of prostate cancer     Actinic keratosis     Neoplasm of uncertain behavior of skin     Pleural effusion     History of malignant neoplasm of thyroid     Lung mass     Dyspnea     Primary adenocarcinoma of right lung (HCC)     Hypercalcemia     Osteoarthrosis     Moderate protein-calorie malnutrition (HCC)     General weakness     Sepsis ([address]     Fall     Malignant pleural effusion      Electronically signed by Kendal Mercedes MD on 3/30/2021 at 10:39 AM

## 2021-03-30 NOTE — CARE COORDINATION
Call received from [facility] - peer to peer option for SNF extended to attending - PS sent to attending - await response    Discharge [facility] Case Management Department  Written by: Mireille Savage RN    Patient Name: Roscoe Garduno  Attending Provider: Olivia Meyers MD  Admit Date: 3/27/2021 12:29 PM  MRN: [id number]  Account: [de-identified]                     : 1932  Discharge Date: 3/30/2021      Disposition: [facility]. accepted - transport per family.  Call to [facility] at [facility] to update with d/c    Mireille Savage RN

## 2021-03-30 NOTE — PLAN OF CARE
Siderails up x 2  Hourly rounding  Call light in reach  Instructed to call for assist before attempting out of bed.   Remains free from falls and accidental injury at this time   Floor free from obstacles  Bed is locked and in lowest position  Adequate lighting provided  Bed alarm on, Red Falling star and Stay with Me signs posted     Problem: Falls - Risk of:  Goal: Will remain free from falls  Description: Will remain free from falls  3/30/2021 1107 by Edith Crooks RN  Outcome: Ongoing  3/30/2021 0530 by Selma Ware RN  Outcome: Ongoing  Goal: Absence of physical injury  Description: Absence of physical injury  3/30/2021 1107 by Edith Crooks RN  Outcome: Ongoing  3/30/2021 0530 by Selma Ware RN  Outcome: Ongoing     Problem: Falls - Risk of:  Goal: Will remain free from falls  Description: Will remain free from falls  3/30/2021 1107 by Edith Crooks RN  Outcome: Ongoing  3/30/2021 0530 by Selma Ware RN  Outcome: Ongoing  Goal: Absence of physical injury  Description: Absence of physical injury  3/30/2021 1107 by Edith Crooks RN  Outcome: Ongoing  3/30/2021 0530 by Selma Ware RN  Outcome: Ongoing     Problem: Skin Integrity:  Goal: Will show no infection signs and symptoms  Description: Will show no infection signs and symptoms  3/30/2021 1107 by Edith Crooks RN  Outcome: Ongoing  3/30/2021 0530 by Selma Ware RN  Outcome: Ongoing  Goal: Absence of new skin breakdown  Description: Absence of new skin breakdown  3/30/2021 1107 by Edith Crooks RN  Outcome: Ongoing  3/30/2021 0530 by Selma Ware RN  Outcome: Ongoing     Problem: Nutrition  Goal: Optimal nutrition therapy  3/30/2021 1107 by Edith Crooks RN  Outcome: Ongoing  3/30/2021 0530 by Selma Ware RN  Outcome: Ongoing     Problem: Musculor/Skeletal Functional Status  Goal: Highest potential functional level  3/30/2021 1107 by Edith Crooks RN  Outcome: Ongoing  3/30/2021 0530 by Selma Ware RN Outcome: Ongoing  Goal: Absence of falls  3/30/2021 1107 by Charis Carson RN  Outcome: Ongoing  3/30/2021 0530 by Meera Mcintosh RN  Outcome: Ongoing

## 2021-03-30 NOTE — PROGRESS NOTES
_                         Today's Date: 3/30/2021  Patient Name: Hayder Mckenzie  Date of admission: 3/27/2021 12:29 PM  Patient's age: 80 y.o., 7/2/1932  Admission Dx: Weakness [R53.1]  Sepsis [facility]) [A41.9]      Requesting Physician: Karen Cisneros MD    CHIEF COMPLAINT: Generalized weakness and fatigue. Multiple falls. Consult for stage IV lung cancer. SUBJECTIVE:  . The patient was seen and examined. Continues to have significant fatigue. He has difficulty with swallowing the pills. He has no fever or chills. Considering placement. BRIEF CASE HISTORY:    The patient is a 80 y.o.  male who is admitted to the hospital for further management of extreme weakness and fatigue and recurrent falls. Symptoms were getting worse over the last 2 to 3 days. No loss of consciousness. No seizures. No nausea or vomiting. Patient was admitted and has been managed as possible sepsis. He started feeling better. Patient is known to our practice with stage IV non-small cell lung cancer. He had malignant pleural effusion. Patient was treated with [place] but due to progression in January 2021 he was switched to different regimen. Currently he is on immunotherapy treatment with Tafinlar and Mekinist.  He tolerated the treatment fairly well with no major side effects. Past Medical History:   has a past medical history of Arthritis, Hearing loss, Hyperlipidemia, Hypertension, Lung cancer ([address] Malignant pleural effusion, On supplemental oxygen therapy, Prostate cancer ([address] Thyroid cancer ([address] and Thyroid disease. Past Surgical History:   has a past surgical history that includes Prostatectomy; Thyroidectomy; Carpal tunnel release (Bilateral); Mouth surgery; and IR INSERT TUNNELED PLEURA CATH W CUFF (10/19/2020). Family History: family history includes Diabetes in his brother; Prostate Cancer in his father.   Social History:   reports that he quit smoking about 52 years ago. His smoking use included cigarettes. He has a 20.00 pack-year smoking history. He has never used smokeless tobacco. He reports previous alcohol use. He reports that he does not use drugs. Medications:    Prior to Admission medications    Medication Sig Start Date End Date Taking?  Authorizing Provider   Dabrafenib Mesylate 75 MG CAPS Take 75 mg by mouth 2 times daily 3/26/21  Yes Tono Mcginnis MD   Trametinib Dimethyl Sulfoxide (MEKINIST) 2 MG TABS Take 2 mg by mouth daily 3/26/21  Yes Tono Mcginnis MD   ondansetron (ZOFRAN) 4 MG tablet Take 1 tablet by mouth 3 times daily as needed for Nausea or Vomiting 3/12/21  Yes Tono Mcginnis MD   meloxicam (MOBIC) 15 MG tablet Take 1 tablet by mouth daily 2/15/21  Yes LO Becerril CNP   levothyroxine (SYNTHROID) 100 MCG tablet TAKE 1 TABLET BY MOUTH EVERY DAY 1/26/21  Yes LO Becerril CNP   loratadine (CLARITIN) 10 MG tablet Take 1 tablet by mouth daily 1/12/21  Yes LO Becerril CNP   simvastatin (ZOCOR) 40 MG tablet TAKE 1 TABLET BY MOUTH EVERY NIGHT 10/19/20  Yes LO Becerril CNP   Glucos-Chond-Hyal Ac-Ca Fructo (MOVE FREE JOINT HEALTH ADVANCE PO) Take 1 tablet by mouth daily   Yes Historical Provider, MD     Current Facility-Administered Medications   Medication Dose Route Frequency Provider Last Rate Last Admin    vancomycin (VANCOCIN) 1,000 mg in dextrose 5 % 250 mL IVPB  1,000 mg Intravenous Q24H Brayan Flood MD   Stopped at 03/29/21 1907    potassium chloride (KLOR-CON M) extended release tablet 40 mEq  40 mEq Oral PRN Brayan Flood MD   40 mEq at 03/30/21 8235    Or    potassium bicarb-citric acid (EFFER-K) effervescent tablet 40 mEq  40 mEq Oral PRN Brayan Flood MD        Or    potassium chloride 10 mEq/100 mL IVPB (Peripheral Line)  10 mEq Intravenous PRN Brayan Flood MD        sodium chloride flush 0.9 % injection 10 mL  10 mL Intravenous 2 times per day Kassy Diez MD   10 mL at 03/29/21 0859    sodium chloride flush 0.9 % injection 10 mL  10 mL Intravenous PRN Kassy Diez MD        0.9 % sodium chloride infusion  25 mL Intravenous PRN Kassy Diez MD        acetaminophen (TYLENOL) tablet 650 mg  650 mg Oral Q4H PRN Kassy Diez MD        0.9 % sodium chloride infusion   Intravenous Continuous Alycia Blizzard, MD 75 mL/hr at 03/29/21 0127 New Bag at 03/29/21 0127    piperacillin-tazobactam (ZOSYN) 3,375 mg in dextrose 5 % 50 mL IVPB extended infusion (mini-bag)  3,375 mg Intravenous Q8H Alycia Blizzard, MD   Stopped at 03/30/21 0629    pantoprazole (PROTONIX) injection 40 mg  40 mg Intravenous Daily Alycia Blizzard, MD   40 mg at 03/30/21 9624    And    sodium chloride (PF) 0.9 % injection 10 mL  10 mL Intravenous Daily Alycia Blizzard, MD   10 mL at 03/30/21 0807    ondansetron (ZOFRAN) injection 4 mg  4 mg Intravenous Q6H PRN Alycia Blizzard, MD        zolpidem [facility]) tablet 5 mg  5 mg Oral Nightly PRN Alycia Blizzard, MD        levothyroxine (SYNTHROID) tablet 100 mcg  100 mcg Oral Daily Alycia Blizzard, MD   100 mcg at 03/30/21 5084    vancomycin (VANCOCIN) intermittent dosing (placeholder)   Other RX Forest Tarango MD        enoxaparin (LOVENOX) injection 40 mg  40 mg Subcutaneous Daily Kassy Diez MD   40 mg at 03/30/21 1499       Allergies:  Patient has no known allergies. REVIEW OF SYSTEMS:      · General: Significant for weakness and fatigue. Significant for weight loss and decreased appetite. No fever or chills. · Eyes: No blurred vision, eye pain or double vision. · Ears: No hearing problems or drainage. No tinnitus. · Throat: No sore throat, problems with swallowing or dysphagia. · Respiratory: As above. · Cardiovascular: No chest pain, orthopnea or PND. No lower extremity edema. No palpitation.    · Gastrointestinal: No problems with swallowing. No abdominal pain or bloating. No nausea or vomiting. No diarrhea or constipation. No GI bleeding. · Genitourinary: No dysuria, hematuria, frequency or urgency. · Musculoskeletal: No muscle aches or pains. No limitation of movement. No back pain. No gait disturbance, No joint complaints. · Dermatologic: No skin rashes or pruritus. No skin lesions or discolorations. · Psychiatric: No depression, anxiety, or stress or signs of schizophrenia. No change in mood or affect. · Hematologic: No history of bleeding tendency. No bruises or ecchymosis. No history of clotting problems. · Infectious disease: No fever, chills or frequent infections. · Endocrine: No polydipsia or polyuria. No temperature intolerance. · Neurologic: No headaches or dizziness. Generalized weakness of the extremities. No changes in balance, coordination,  memory, mentation, behavior. · Allergic/Immunologic: No nasal congestion or hives. No repeated infections. PHYSICAL EXAM:      /71   Pulse 85   Temp 98.3 °F (36.8 °C) (Oral)   Resp 18   Ht 5' 9\" (1.753 m)   Wt 142 lb 6.7 oz (64.6 kg)   SpO2 96%   BMI 21.03 kg/m²    Temp (24hrs), Av.9 °F (36.6 °C), Min:97.3 °F (36.3 °C), Max:98.4 °F (36.9 °C)      General appearance - not in pain or distress  Mental status - alert and oriented  Eyes - pupils equal and reactive, extraocular eye movements intact  Ears - bilateral TM's and external ear canals normal  Nose - normal and patent, no erythema, discharge or polyps  Mouth - mucous membranes moist, pharynx normal without lesions  Neck - supple, no significant adenopathy  Lymphatics - no palpable lymphadenopathy, no hepatosplenomegaly  Chest -decreased air entry especially in the right side. No wheezing.     Heart - normal rate, regular rhythm, normal S1, S2, no murmurs, rubs, clicks or gallops  Abdomen - soft, nontender, nondistended, no masses or organomegaly  Neurological - alert, oriented, normal speech, no focal findings or movement disorder noted  Musculoskeletal - no joint tenderness, deformity or swelling  Extremities - peripheral pulses normal, no pedal edema, no clubbing or cyanosis  Skin - normal coloration and turgor, no rashes, no suspicious skin lesions noted           DATA:      Labs:       CBC:   Recent Labs     03/28/21  0532 03/29/21  0545   WBC 3.6 3.1*   HGB 9.0* 8.7*   HCT 28.2* 27.6*   PLT 80* 101*     BMP:   Recent Labs     03/29/21  0545 03/30/21  1125    137   K 3.3* 3.2*   CO2 22 23   BUN 16 9   CREATININE 0.56* 0.50*   LABGLOM >60 >60   GLUCOSE 102* 114*     PT/INR:   Recent Labs     03/27/21  1320   PROTIME 12.1   INR 1.2     APTT:  Recent Labs     03/27/21  1320   APTT 27.7     LIVER PROFILE:  Recent Labs     03/27/21  1320   AST 90*   ALT 15   LABALBU 3.5     CT CERVICAL SPINE WO CONTRAST  Narrative: EXAMINATION:  CT OF THE CERVICAL SPINE WITHOUT CONTRAST 3/27/2021 11:13 am    TECHNIQUE:  CT of the cervical spine was performed without the administration of  intravenous contrast. Multiplanar reformatted images are provided for review. Dose modulation, iterative reconstruction, and/or weight based adjustment of  the mA/kV was utilized to reduce the radiation dose to as low as reasonably  achievable. COMPARISON:  Correlated with CT chest of 01/21/2021    HISTORY:  ORDERING SYSTEM PROVIDED HISTORY: fall  TECHNOLOGIST PROVIDED HISTORY:  fall  Decision Support Exception->Emergency Medical Condition (MA)  Reason for Exam: generalized weakness and falls over the last 2 days. Fall  this morning  Acuity: Acute  Type of Exam: Initial    FINDINGS:  The cervical spine demonstrates decreasedmineralization with levo angulation  of the cervical spine. There is no evidence of fracture or subluxation. There is loss of disc height with eburnation of the vertebral endplates at  the  M0-4, C6-7 and C7-T1 levels. There are small marginal osteophytes at  multiple levels.  The central canal is grossly patent. There is bilateral  facet hypertrophy at multiple levels throughout the cervical spine. The  pedicles and posterior elements are otherwise intact. The prevertebral and  paravertebral soft tissues are unremarkable. The atlanto-dens interval and  dens are intact. The visualized lung apices show right apical parenchymal and  pleural soft tissue changes with loculated right pleural effusion, unchanged  from prior exam.  Vascular calcifications are seen compatible with  atherosclerotic disease. Impression: Multilevel cervical spondylosis and degenerative disc disease. Evidence of paracervical spasm. No acute bony abnormalities are noted  XR CHEST PORTABLE  Narrative: EXAMINATION:  ONE XRAY VIEW OF THE CHEST    3/27/2021 2:24 pm    COMPARISON:  10/12/2020    HISTORY:  ORDERING SYSTEM PROVIDED HISTORY: fall, tachycardia  TECHNOLOGIST PROVIDED HISTORY:  fall, tachycardia  Reason for Exam: fall, tachycardia  Acuity: Acute  Type of Exam: Initial    FINDINGS:  Moderate pleural effusion with adjacent atelectasis/airspace disease. Left  hemithorax is clear. Trachea is midline. Mild generalized osteopenia. Subtle atherosclerotic calcification of the aortic arch. Impression: Moderate right-sided pleural effusion with adjacent atelectasis/airspace  disease. CT HEAD WO CONTRAST  Narrative: EXAMINATION:  CT OF THE HEAD WITHOUT CONTRAST  3/27/2021 11:13 am    TECHNIQUE:  CT of the head was performed without the administration of intravenous  contrast. Dose modulation, iterative reconstruction, and/or weight based  adjustment of the mA/kV was utilized to reduce the radiation dose to as low  as reasonably achievable. COMPARISON:  None. HISTORY:  ORDERING SYSTEM PROVIDED HISTORY: fall  TECHNOLOGIST PROVIDED HISTORY:  fall    Decision Support Exception->Emergency Medical Condition (MA)  Reason for Exam: generalized weakness and falls over the last 2 days.   Fall  this morning  Acuity: Acute  Type of Exam: Initial FINDINGS:  BRAIN/VENTRICLES:    The cerebral hemispheres, brainstem, and cerebellum have a normal appearance  for the patient's age. The falx is midline. The ventricles and peripheral  sulci are mildly dilated. There is decreased attenuation in the  periventricular white matter. There is no sign of a space occupying lesion,  infarction, or hemorrhage. Orbits: Portion of the orbits demonstrate no acute abnormality. SINUSES: The  imaged portions of the paranasal sinuses are clear. The  mastoids and the middle ear chambers are clear. SOFT TISSUES/SKULL:  No acute abnormality of the visualized skull or soft  tissues. Vascular calcifications are seen compatible with atherosclerotic  disease. Impression: [  Mild central and cortical cerebral atrophy. Mild chronic deep white matter ischemic changes    No acute intracranial abnormalities are noted. IMPRESSION:    Primary Problem  General weakness    Active Hospital Problems    Diagnosis Date Noted    Sepsis ([address] [A41.9] 03/28/2021    Fall [X93. XXXA]     Malignant pleural effusion [J91.0]     General weakness [R53.1] 03/27/2021    Primary adenocarcinoma of right lung ([address] [C34.91] 10/15/2020   Excessive weakness and fatigue. Stage IV lung adenocarcinoma. Malignant pleural effusion. RECOMMENDATIONS:  1. The patient condition seems to be slowly improving. Continue supportive care  2. Agree that the patient needs placement as his overall  performance status seems to be declining  3. As for the lung cancer is concerned, patient has stage IV disease and he received Keytruda in the past.  Currently on immunotherapy with Tafinlar and Mekinist.  The patient is interested to continue long therapy. He has been on the medicine in less than a month. We will plan to continue that for a couple more months and check for response. 4. The patient is having difficulty with some of the pills specially potassium.   We will probably give him IV potassium replacement. I will give the effervescent potassium  5. Monitor labs. 6. Patient's questions were answered to the best of his satisfaction and he verbalized full understanding and agreement. Janee Hamlin MD                                                This note is created with the assistance of a speech recognition program.  While intending to generate a document that actually reflects the content of the visit, the document can still have some errors including those of syntax and sound a like substitutions which may escape proof reading. It such instances, actual meaning can be extrapolated by contextual diversion.

## 2021-03-30 NOTE — PROGRESS NOTES
Physician Progress Note      Rayne Rg  CSN #:                  [id number]  :                       1932  ADMIT DATE:       3/27/2021 12:29 PM  [address] [place] DATE:  RESPONDING  PROVIDER #:        Vincent Branham MD          QUERY TEXT:    Pt admitted with weakness/falls and has Anemia documented. If possible, please   document in progress notes and discharge summary further specificity   regarding the acuity and type of anemia:    The medical record reflects the following:  Risk Factors: Cancer,Age  Clinical Indicators: Pt with stage 4 lung Ca- dehydrated with weakness on   admit. Hgb 12.8 -9-8.7 over last 48 hours. Hypotensive at times/tachycardic   . Per 3/29 prog note: 'Anemia-since hydrated-continue to push po'  Treatment: IVF Bolus-,then 75ml/hr. Hem/onc consult. Monitoring Labs  Options provided:  -- Anemia due to acute blood loss  -- Anemia due to chronic blood loss  -- Anemia due to acute on chronic blood loss  -- Anemia due to iron deficiency  -- Anemia due to chronic disease  -- Dilutional anemia  -- Other - I will add my own diagnosis  -- Disagree - Not applicable / Not valid  -- Disagree - Clinically unable to determine / Unknown  -- Refer to Clinical Documentation Reviewer    PROVIDER RESPONSE TEXT:    This patient has anemia due to chronic disease.     Query created by: Elvia Wu on 3/30/2021 10:22 AM      Electronically signed by:  Vincent Branham MD 3/30/2021 10:40 AM

## 2021-03-30 NOTE — PROGRESS NOTES
Occupational Therapy  Facility/Department: [facility] ICU  Daily Treatment Note  NAME: Frankie Gallagher  : 1932  MRN: [id number]    Date of Service: 3/30/2021    Chief Complaint   Patient presents with    Fall     weakness yesterday-denies striking head-No LOC/No anticoags     Discharge Recommendations:  Patient would benefit from continued therapy after discharge       Assessment   Performance deficits / Impairments: Decreased functional mobility ; Decreased ADL status; Decreased endurance;Decreased balance;Decreased high-level IADLs;Decreased safe awareness;Decreased cognition;Decreased strength;Decreased fine motor control  Prognosis: Good  Decision Making: Medium Complexity  OT Education: OT Role;Plan of Care;Precautions; ADL Adaptive Strategies;Transfer Training;Energy Conservation;Equipment  REQUIRES OT FOLLOW UP: Yes  Safety Devices  Safety Devices in place: Yes  Type of devices: Call light within reach; Chair alarm in place;Nurse notified; Left in chair  Restraints  Initially in place: No         Patient Diagnosis(es): The primary encounter diagnosis was Fall, initial encounter. Diagnoses of Elevated troponin and General weakness were also pertinent to this visit. has a past medical history of Arthritis, Hearing loss, Hyperlipidemia, Hypertension, Lung cancer ([address] Malignant pleural effusion, On supplemental oxygen therapy, Prostate cancer ([address] Thyroid cancer ([address] and Thyroid disease. has a past surgical history that includes Prostatectomy; Thyroidectomy; Carpal tunnel release (Bilateral); Mouth surgery; and IR INSERT TUNNELED PLEURA CATH W CUFF (10/19/2020). Restrictions  Restrictions/Precautions  Restrictions/Precautions: Fall Risk  Required Braces or Orthoses?: No  Position Activity Restriction  Other position/activity restrictions: Up with assistance, activity as tolerated.      Subjective   General  Patient assessed for rehabilitation services?: Yes  Family / Caregiver Present:

## 2021-03-31 NOTE — DISCHARGE SUMMARY
Discharge Summary    Brooke Alexandra  :  1932  MRN:  [id number]    Admit date:  3/27/2021  Discharge date:  3/30/2021    Admitting Physician:  Kayleigh Vickers MD    PCP: Amy Russell, APRN - CNP    Discharge Diagnoses:    Patient Active Problem List   Diagnosis    Postoperative hypothyroidism    Essential hypertension    Hyperlipidemia    Osteoarthritis of both hips    H/O head and neck radiation    History of prostate cancer    Actinic keratosis    Neoplasm of uncertain behavior of skin    Pleural effusion    History of malignant neoplasm of thyroid    Lung mass    Dyspnea    [facility]    Hypercalcemia    Osteoarthrosis    Moderate protein-calorie malnutrition ([facility]    General weakness    Sepsis ([facility]    Fall    Malignant pleural effusion       Discharged Condition:  stable    Hospital Course:   Patient admitted for weakness and multiple falls. Pt was hydrated and felt much stronger and improved. Was able to ambulate with cane or walker and daughter was able to get pt and wife transferred to [place] apt [place] and pt was discharged to [facility].       Discharge Medications:       Ivette Rivas   Home Medication Instructions [id number]    Printed on:21 1036   Medication Information                      Dabrafenib Mesylate 75 MG CAPS  Take 75 mg by mouth 2 times daily             Glucos-Chond-Hyal Ac-Ca Fructo (MOVE FREE JOINT HEALTH ADVANCE PO)  Take 1 tablet by mouth daily             levothyroxine (SYNTHROID) 100 MCG tablet  TAKE 1 TABLET BY MOUTH EVERY DAY             loratadine (CLARITIN) 10 MG tablet  Take 1 tablet by mouth daily             meloxicam (MOBIC) 15 MG tablet  Take 1 tablet by mouth daily             ondansetron (ZOFRAN) 4 MG tablet  Take 1 tablet by mouth 3 times daily as needed for Nausea or Vomiting             simvastatin (ZOCOR) 40 MG tablet  TAKE 1 TABLET BY MOUTH EVERY NIGHT             Trametinib Dimethyl

## 2021-03-31 NOTE — TELEPHONE ENCOUNTER
[facility] Transitions Initial Follow Up Call    Outreach made within 2 business days of discharge: Yes    Patient: Marisabel Landers Patient : 1932   MRN: [id number]  Reason for Admission: There are no discharge diagnoses documented for the most recent discharge. Discharge Date: 3/30/21       Spoke with: Herve Leung    Discharge department/facility: [facility] Patient Contact:  Was patient able to fill all prescriptions: Yes  Was patient instructed to bring all medications to the follow-up visit: Yes  Is patient taking all medications as directed in the discharge summary?  Yes  Does patient understand their discharge instructions: Yes  Does patient have questions or concerns that need addressed prior to 7-14 day follow up office visit: no    Scheduled appointment with PCP within 7-14 days    Follow Up  Future Appointments   Date Time Provider Ashok Carcamo   2021  2:00 PM Val Galindo MD Resp Vielka Hancock   2021 11:15 AM Brooklynn Aaron MD [address] [place]

## 2021-04-05 NOTE — PROGRESS NOTES
Hayder Mckenzie is a 80 y.o. male being seen for his tremors follow up. Location of visit: [facility]    Visit Date:  4/5/2021       Reason for Visit:    Chief Complaint   Patient presents with    Tremors        HPI   Patient called twice on Sunday due to new onset tremors. He stated the were kept him up much of the night on Saturday and then went away for awhile and returned on Saturday afternoon. Hydroxyzine ordered. Had tremors Sunday evening and took 2 doses of hydroxyzine and was able to sleep most of the night. When he woke up this morning his pant legs were wet. Lower legs are swollen and wet. States he has a rash in his groin for a couple weeks. Review of Systems   Constitutional: Positive for fatigue. Negative for chills and fever. HENT: Negative for congestion, nosebleeds and sinus pain. Respiratory: Positive for shortness of breath. Negative for cough. Cardiovascular: Positive for leg swelling. Negative for chest pain. Gastrointestinal: Negative for constipation, diarrhea and nausea. Endocrine: Negative for polydipsia and polyphagia. Genitourinary: Negative for difficulty urinating and dysuria. Musculoskeletal: Positive for gait problem. Negative for back pain. Skin: Positive for rash. Negative for color change and wound. Neurological: Negative for dizziness, light-headedness and headaches. Psychiatric/Behavioral: Positive for sleep disturbance. Negative for dysphoric mood. The patient is nervous/anxious. Physical Exam  Vitals signs and nursing note reviewed. Constitutional:       Appearance: Normal appearance. HENT:      Head: Normocephalic. Right Ear: External ear normal.   Cardiovascular:      Rate and Rhythm: Normal rate. Heart sounds: Normal heart sounds. No murmur. Pulmonary:      Effort: Pulmonary effort is normal.      Breath sounds: Normal breath sounds.    Abdominal:      General: Bowel sounds are normal.      Palpations: Abdomen is soft. Musculoskeletal:      Right lower le+ Pitting Edema present. Left lower le+ Pitting Edema present. Skin:     General: Skin is warm. Findings: Erythema and rash present. Comments: Bilateral groin diffuse macular rash. No drainage, non tender, no increase in temperature of skin noted. Bilateral lower extremities, edema , erythema, tender, warm to touch, serous drainage, no odor. Neurological:      Mental Status: He is alert and oriented to person, place, and time. Motor: Weakness present. Gait: Gait abnormal.   Psychiatric:         Mood and Affect: Mood is anxious. Thought Content: Thought content normal.          Current Outpatient Medications   Medication Sig Dispense Refill    hydrOXYzine (ATARAX) 25 MG tablet Take 1 tablet by mouth every 4 hours as needed for Anxiety (tremors) 60 tablet 1    furosemide (LASIX) 20 MG tablet Take 1 tablet by mouth daily X 3 days then we will reassess. 30 tablet 1    cephALEXin (KEFLEX) 500 MG capsule Take 1 capsule by mouth 3 times daily for 10 days 30 capsule 0    fluconazole (DIFLUCAN) 150 MG tablet Take 1 tablet by mouth once for 1 dose 1 tablet 1    nystatin (MYCOSTATIN) 088164 UNIT/GM powder Apply 2 times daily.  60 g 3    Dabrafenib Mesylate 75 MG CAPS Take 75 mg by mouth 2 times daily 60 capsule 2    Trametinib Dimethyl Sulfoxide (MEKINIST) 2 MG TABS Take 2 mg by mouth daily 30 tablet 2    ondansetron (ZOFRAN) 4 MG tablet Take 1 tablet by mouth 3 times daily as needed for Nausea or Vomiting 90 tablet 0    meloxicam (MOBIC) 15 MG tablet Take 1 tablet by mouth daily 30 tablet 3    levothyroxine (SYNTHROID) 100 MCG tablet TAKE 1 TABLET BY MOUTH EVERY DAY 90 tablet 2    loratadine (CLARITIN) 10 MG tablet Take 1 tablet by mouth daily 30 tablet 2    simvastatin (ZOCOR) 40 MG tablet TAKE 1 TABLET BY MOUTH EVERY NIGHT 90 tablet 2    Glucos-Chond-Hyal Ac-Ca Fructo (MOVE FREE JOINT HEALTH ADVANCE PO) Take 1 wrap gently. Change daily and PRN.

## 2021-04-05 NOTE — TELEPHONE ENCOUNTER
Jan Alaniz, pt's daughter, called stating pt was in the hospital and was d/c'd to an assisted living. She states she was concerned because he is now developing ankle and feet swelling, his legs are seeping, and his PCP put him on \"a bunch of medications\" after he saw him today. She states he put the pt on lasix, dressing changes for his legs, atarax for tremors, diflucan and antifungal cream for his groin, and antibiotics. She wanted to make sure everything was ok with him being on oral chemo and wanted to know if these side effects were from his oral chemo. Writer states that even if side effects are from chemo, we would do the same interventions, and would not change anything at this time. Writer offered appt with Dr. Yosef Walker tomorrow if she needed to further discuss concerns, but Jan Alaniz was satisfied with conversation, and just wanted our office aware of medications. She will call back if she needs anything further.

## 2021-04-05 NOTE — PROGRESS NOTES
Patient coming to clinic with daughter. They report that they are not getting any drainage drainage from the Pleurx catheter for the last few weeks. Recommended CT chest for evaluation of position of Pleurx catheter, however they want to wait till they see Dr. Yosef Walker in coming week and wants to get CT imaging at that point. Will review the CT scan and discuss further plan regarding the Pleurx catheter.

## 2021-04-05 NOTE — PATIENT INSTRUCTIONS
Patient's daughter will call to schedule a virtual visit with Dr. Reena Chi after the CT scan to be scheduled at the end of April by oncology   Printed AVS for patient   sanya    Call Marla Mitchell at [phone]

## 2021-04-07 NOTE — TELEPHONE ENCOUNTER
Name: Tita Boone  : 1932  MRN: [id number]    Oncology Navigation Follow-Up Note    Navigator reviewing chart and reaching out to daughter-Glendy, pt. With recent hospital admission and placed on broad spectrum ATB's. Pt. Continues to have weeping edema of lower legs. Daughter assisting with dressing changes and ace wraps. Pt. Has declining appetite and encouraging pt. To hydrate. Pulmonary wanting Cxr/CT chest prior to pleurex cathter removal to make sure no pockets of fluid.  Consuelo planning to coordinate next scans , then possible catheter removal.   Electronically signed by Symone Lei RN on 2021 at 3:44 PM

## 2021-04-13 NOTE — PROGRESS NOTES
[address] [address] PRIMARY CARE  [address] [address] [address]  Dept: [phone]    Katerina Marte is a 80 y.o. male Established patient, who presents today for his medical conditions/complaints as noted below. Chief Complaint   Patient presents with    Leg Swelling       HPI:     HPI   Patient was seen at his [address] [facility] apartment. Reviewed prior notes Pulmonary  Reviewed previous Labs 3/30/21    Oral intake is poor - fluids and food  He is having some difficulty swallowing. States difficult to swallow pills and food and fluids. Edema is improved but still significant in feet. He reports black watery stools. LDL Cholesterol (mg/dL)   Date Value   04/26/2019 58       (goal LDL is <100)   AST (U/L)   Date Value   03/27/2021 90 (H)     ALT (U/L)   Date Value   03/27/2021 15     BUN (mg/dL)   Date Value   03/30/2021 9     TSH (mIU/L)   Date Value   12/29/2020 0.04 (L)     BP Readings from Last 3 Encounters:   04/13/21 108/60   04/05/21 112/68   03/30/21 125/71          (goal 120/80)    Past Medical History:   Diagnosis Date    Actinic keratosis 5/17/2019    Arthritis     Dyspnea     Essential hypertension 9/28/2018    Fall     General weakness 3/27/2021    H/O head and neck radiation 11/13/2018    Hearing loss     History of malignant neoplasm of thyroid 6/20/2017    History of prostate cancer 11/13/2018    Hypercalcemia 1/15/2021    Hyperlipidemia     Hypertension     Lung cancer ([address] 10/2020    stage 4, cells in pleural space.     Lung mass 9/30/2020    Malignant pleural effusion     RIGHT    Moderate protein-calorie malnutrition ([address] 3/22/2021    Neoplasm of uncertain behavior of skin 5/17/2019    On supplemental oxygen therapy     Osteoarthritis of both hips 11/13/2018    Osteoarthrosis 6/20/2017    Pleural effusion 9/30/2020    Postoperative hypothyroidism 11/13/2018    Primary adenocarcinoma of right lung [facility] 10/15/2020    Prostate cancer ([facility] 1992 approx    treated surgically    Sepsis [facility] 3/28/2021    Thyroid cancer ([facility] 2017    treated surgically and with radiation    Thyroid disease       Past Surgical History:   Procedure Laterality Date    CARPAL TUNNEL RELEASE Bilateral     IR INSERT TUNNELED PLEURA CATH W CUFF  10/19/2020    IR INSERT TUNNELED PLEURA CATH W CUFF 10/19/2020 Karlene Carnes MD [facility]    MOUTH SURGERY      dental repairs    PROSTATECTOMY      THYROIDECTOMY         Family History   Problem Relation Age of Onset    Prostate Cancer Father     Diabetes Brother        Social History     Tobacco Use    Smoking status: Former Smoker     Packs/day: 1.00     Years: 20.00     Pack years: 20.00     Types: Cigarettes     Quit date:      Years since quittin.3    Smokeless tobacco: Never Used   Substance Use Topics    Alcohol use: Not Currently      Current Outpatient Medications   Medication Sig Dispense Refill    loratadine (CLARITIN) 10 MG tablet TAKE 1 TABLET BY MOUTH DAILY 30 tablet 2    hydrOXYzine (ATARAX) 25 MG tablet Take 1 tablet by mouth every 4 hours as needed for Anxiety (tremors) 60 tablet 1    nystatin (MYCOSTATIN) 989566 UNIT/GM powder Apply 2 times daily.  60 g 3    Dabrafenib Mesylate 75 MG CAPS Take 75 mg by mouth 2 times daily 60 capsule 2    Trametinib Dimethyl Sulfoxide (MEKINIST) 2 MG TABS Take 2 mg by mouth daily 30 tablet 2    ondansetron (ZOFRAN) 4 MG tablet Take 1 tablet by mouth 3 times daily as needed for Nausea or Vomiting 90 tablet 0    meloxicam (MOBIC) 15 MG tablet Take 1 tablet by mouth daily 30 tablet 3    levothyroxine (SYNTHROID) 100 MCG tablet TAKE 1 TABLET BY MOUTH EVERY DAY 90 tablet 2    simvastatin (ZOCOR) 40 MG tablet TAKE 1 TABLET BY MOUTH EVERY NIGHT 90 tablet 2    Glucos-Chond-Hyal Ac-Ca Fructo (MOVE FREE JOINT HEALTH ADVANCE PO) Take 1 tablet by mouth daily       No current facility-administered medications for this visit. No Known Allergies    Health Maintenance   Topic Date Due    DTaP/Tdap/Td vaccine (1 - Tdap) Never done    Shingles Vaccine (2 of 2) 05/07/2019    Lipid screen  04/26/2020    PSA counseling  10/01/2021    TSH testing  12/29/2021    Annual Wellness Visit (AWV)  03/23/2022    Potassium monitoring  03/30/2022    Creatinine monitoring  03/30/2022    Flu vaccine  Completed    Pneumococcal 65+ years Vaccine  Completed    COVID-19 Vaccine  Completed    Hepatitis A vaccine  Aged Out    Hepatitis B vaccine  Aged Out    Hib vaccine  Aged Out    Meningococcal (ACWY) vaccine  Aged Out       Subjective:      Review of Systems   Constitutional: Positive for fatigue. Negative for chills. HENT: Negative for congestion and nosebleeds. Eyes: Negative for pain and redness. Respiratory: Positive for shortness of breath. Negative for cough. Cardiovascular: Negative for chest pain and leg swelling. Gastrointestinal: Positive for diarrhea and nausea. Negative for constipation. Genitourinary: Negative for difficulty urinating and dysuria. Musculoskeletal: Negative for arthralgias and back pain. Skin: Positive for color change. Neurological: Positive for weakness. Negative for light-headedness and headaches. Psychiatric/Behavioral: Positive for sleep disturbance. Negative for dysphoric mood. The patient is nervous/anxious. Objective:     /60   Pulse 88   Temp 98.2 °F (36.8 °C)   Resp 18   SpO2 95%   Physical Exam  Vitals signs and nursing note reviewed. Constitutional:       Appearance: Normal appearance. He is underweight. He is ill-appearing. HENT:      Head: Normocephalic and atraumatic. Right Ear: External ear normal.      Left Ear: External ear normal.   Eyes:      Extraocular Movements: Extraocular movements intact. Pupils: Pupils are equal, round, and reactive to light. Neck:      Musculoskeletal: Neck supple. No muscular tenderness. Cardiovascular:      Rate and Rhythm: Normal rate and regular rhythm. Heart sounds: Normal heart sounds. Comments: Severe pedal edema, minimal lower leg edema, feet are weeping  Pulmonary:      Effort: Pulmonary effort is normal.      Breath sounds: Decreased breath sounds present. Abdominal:      General: Bowel sounds are normal.      Palpations: Abdomen is soft. Feet:      Comments: Bilateral heels soft and bogey   Skin:     General: Skin is warm. Coloration: Skin is pale. Comments: Skin dry and flaking   Neurological:      Mental Status: He is alert and oriented to person, place, and time. Cranial Nerves: No cranial nerve deficit. Motor: Weakness present. Psychiatric:         Attention and Perception: Attention normal.         Mood and Affect: Mood is anxious. Behavior: Behavior normal.         Assessment:       Diagnosis Orders   1. Localized edema  CBC With Auto Differential    Comprehensive Metabolic Panel   2. Non-small cell carcinoma of right lung, stage 4 (HCC)  CBC With Auto Differential    Comprehensive Metabolic Panel   3. Black stools  CBC With Auto Differential    Comprehensive Metabolic Panel        Plan:    1. CBC with diff. 2. CMP  3. Apply skin prep to bilat. Heels daily  4. Continue to wrap feet and  Lower legs with ABD, roll guaze and ace wraps daily    Return in about 2 weeks (around 4/27/2021) for edema, difficulty swallowing. Orders Placed This Encounter   Procedures    CBC With Auto Differential     Standing Status:   Future     Standing Expiration Date:   4/13/2022    Comprehensive Metabolic Panel     Standing Status:   Future     Standing Expiration Date:   4/13/2022     No orders of the defined types were placed in this encounter. Patient given educational materials - see patient instructions. Discussed use, benefit, and side effects of prescribed medications. All patient questions answered.  Pt voiced

## 2021-04-13 NOTE — PATIENT INSTRUCTIONS
1. CBC with diff. 2. CMP  3. Apply skin prep to bilat. Heels daily  4.  Continue to wrap feet and  Lower legs with ABD, roll guaze and ace wraps daily

## 2021-04-14 NOTE — ED PROVIDER NOTES
hours as needed for Anxiety (tremors)    LEVOTHYROXINE (SYNTHROID) 100 MCG TABLET    TAKE 1 TABLET BY MOUTH EVERY DAY    LORATADINE (CLARITIN) 10 MG TABLET    TAKE 1 TABLET BY MOUTH DAILY    MELOXICAM (MOBIC) 15 MG TABLET    Take 1 tablet by mouth daily    NYSTATIN (MYCOSTATIN) 676697 UNIT/GM POWDER    Apply 2 times daily. ONDANSETRON (ZOFRAN) 4 MG TABLET    Take 1 tablet by mouth 3 times daily as needed for Nausea or Vomiting    SIMVASTATIN (ZOCOR) 40 MG TABLET    TAKE 1 TABLET BY MOUTH EVERY NIGHT    TRAMETINIB DIMETHYL SULFOXIDE (MEKINIST) 2 MG TABS    Take 2 mg by mouth daily       ALLERGIES     has No Known Allergies. FAMILY HISTORY     He indicated that the status of his father is unknown. He indicated that the status of his brother is unknown.     family history includes Diabetes in his brother; Prostate Cancer in his father. SOCIAL HISTORY      reports that he quit smoking about 52 years ago. His smoking use included cigarettes. He has a 20.00 pack-year smoking history. He has never used smokeless tobacco. He reports previous alcohol use. He reports that he does not use drugs. PHYSICAL EXAM     INITIAL VITALS:  height is 5' 9\" (1.753 m) and weight is 59 kg (130 lb). His oral temperature is 97.5 °F (36.4 °C). His blood pressure is 136/75 and his pulse is 95. His respiration is 16 and oxygen saturation is 100%. \Constitutional: Alert, oriented x3, nontoxic, afebrile, answering questions appropriately, acting properly for age, in no acute distress. 1 and half centimeter linear laceration to the left-hand side of the patient's occiput.   HEENT: Extraocular muscles intact, mucus membranes moist, TMs clear bilaterally, no posterior pharyngeal erythema or exudates, Pupils equal, round, reactive to light,   Neck: Trachea midline, Supple without lymphadenopathy, no posterior midline neck tenderness to palpation  Cardiovascular: Regular rhythm and rate no S3, S4, or murmurs  Respiratory: Clear to auscultation bilaterally no wheezes, rhonchi, rales, no respiratory distress  Gastrointestinal: Soft, nontender, nondistended, positive bowel sounds. No rebound, rigidity, or guarding. Musculoskeletal: No extremity pain or swelling  Neurologic: Moving all 4 extremities without difficulty there are no gross focal neurologic deficits  Skin: Warm and dry      DIFFERENTIAL DIAGNOSIS/ MDM:     Patient will be sent for CT of his head his tetanus will be updated he is alert oriented without any problems if the CT is negative patient can go back to the nursing facility. DIAGNOSTIC RESULTS     EKG: All EKG's are interpreted by the Emergency Department Physician who either signs or Co-signs this chart in the absence of a cardiologist.        Not indicated unless otherwise documented above    LABS:  Results for orders placed or performed during the hospital encounter of 71/83/46   Basic Metabolic Panel   Result Value Ref Range    Glucose 118 (H) 70 - 99 mg/dL    BUN 29 (H) 8 - 23 mg/dL    CREATININE 0.56 (L) 0.70 - 1.20 mg/dL    Bun/Cre Ratio NOT REPORTED 9 - 20    Calcium 7.7 (L) 8.6 - 10.4 mg/dL    Sodium 131 (L) 135 - 144 mmol/L    Potassium 4.1 3.7 - 5.3 mmol/L    Chloride 95 (L) 98 - 107 mmol/L    CO2 29 20 - 31 mmol/L    Anion Gap 7 (L) 9 - 17 mmol/L    GFR Non-African American >60 >60 mL/min    GFR African American >60 >60 mL/min    GFR Comment          GFR Staging NOT REPORTED    Lactic Acid   Result Value Ref Range    Lactic Acid 1.5 0.5 - 2.2 mmol/L       Not indicated unless otherwise documented above    RADIOLOGY:   I reviewed the radiologist interpretations:  CT HEAD WO CONTRAST   Final Result      Stable study. No evidence for acute intracranial hemorrhage, territorial   infarction or intracranial mass lesion. Mild chronic microangiopathic ischemic disease. Mild generalized volume loss.              Not indicated unless otherwise documented above    EMERGENCY DEPARTMENT COURSE:     The patient was given the following medications:  Orders Placed This Encounter   Medications    Tetanus-Diphth-Acell Pertussis (BOOSTRIX) injection 0.5 mL    0.9 % sodium chloride bolus        Vitals:    Vitals:    04/13/21 2217   BP: 136/75   Pulse: 95   Resp: 16   Temp: 97.5 °F (36.4 °C)   TempSrc: Oral   SpO2: 100%   Weight: 59 kg (130 lb)   Height: 5' 9\" (1.753 m)     -------------------------  /75   Pulse 95   Temp 97.5 °F (36.4 °C) (Oral)   Resp 16   Ht 5' 9\" (1.753 m)   Wt 59 kg (130 lb)   SpO2 100%   BMI 19.20 kg/m²         I have reviewed the disposition diagnosis with the patient and or their family/guardian. I have answered their questions and given discharge instructions. They voiced understanding of these instructions and did not have any further questions or complaints. CRITICAL CARE:    None    CONSULTS:    None    PROCEDURES:    None      OARRS Report if indicated             FINAL IMPRESSION      1. Injury of head, initial encounter          DISPOSITION/PLAN   DISPOSITION Decision To Discharge    I have reviewed the disposition diagnosis with the patient and or their family/guardian. I have answered their questions and given discharge instructions. They voiced understanding of these instructions and did not have any further questions or complaints. Reevaluation: Patient was initially being evaluated for head injury which was the only complaint that he had when he arrived here however his daughter called and asked that we add some laboratories that she was concerned about some dehydration. Laboratories have been added they are within normal limits patient can be discharged home his CT is negative he is stable for discharge.       PATIENT REFERRED TO:  Felicia Bernard, LO - CNP  [address] [facility] [address] [phone]    In 2 days        DISCHARGE MEDICATIONS:  New Prescriptions    No medications on file       (Please note that portions of this note were completed with a

## 2021-04-14 NOTE — ED NOTES
Writer spoke with pt's daughter. Pt's daughter states that pt is currently receiving target gene therapy for stage IV lung cancer and is on [address] and [address]. Pt daughter states side effect of medications is dehydration. Daughter states that last time pt was in hospital his labs we all abnormal. Daughter requesting that pt labs be checked. Message passed on to  [facility].        Nicole Gardner RN  04/13/21 [id number]

## 2021-04-16 NOTE — PROGRESS NOTES
Patient arrived for hydration after seeing Dr Rai Ballesteros w/o incident   pts daughter requested the hydration given over 1 hr since she has an appt she needs to go to   To exit in wheelchair   [address] [place] [address]

## 2021-04-16 NOTE — PROGRESS NOTES
y.o.  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     [facility] oncology was consulted for suspicion of metastatic disease. Unknown primary.     Patient also reported poor appetite and intermittent episodes of dry cough since 1 month. No significant weight loss reported. Significant risk factors include 13-pack-year history of smoking quit in 1969  Daily drinker drinking 2-3 drinks every day. Denied exposure to chemicals like asbestos/silica  Evaluated by pulmonology. Had IR guided thoracentesis. Surg path report showed adenocarcinoma of lung. Review of systems  General: no fever or night sweats, Weight is stable. ENT: No double or blurred vision, no tinnitus or hearing problem, no dysphagia or sore throat   Respiratory: No chest pain, no shortness of breath, no cough or hemoptysis. Cardiovascular: Denies chest pain, PND or orthopnea. No L E swelling or palpitations. Gastrointestinal:    No nausea or vomiting, abdominal pain, diarrhea or constipation. Genitourinary: Denies dysuria, hematuria, frequency, urgency or incontinence. Neurological: Denies headaches, decreased LOC, no sensory or motor focal deficits.      No Known Allergies    Medications:   Reviewed in Epic   Current Outpatient Medications   Medication Sig Dispense Refill    loratadine (CLARITIN) 10 MG tablet TAKE 1 TABLET BY MOUTH DAILY 30 tablet 2    Trametinib Dimethyl Sulfoxide (MEKINIST) 2 MG TABS Take 2 mg by mouth daily 30 tablet 2    meloxicam (MOBIC) 15 MG tablet Take 1 tablet by mouth daily 30 tablet 3    levothyroxine (L) 04/14/2021    K 4.1 04/14/2021    CL 95 (L) 04/14/2021    CO2 29 04/14/2021    BUN 29 (H) 04/14/2021    CREATININE 0.56 (L) 04/14/2021    GLUCOSE 118 (H) 04/14/2021    CALCIUM 7.7 (L) 04/14/2021    PROT 6.4 03/27/2021    LABALBU 3.5 03/27/2021    BILITOT 0.72 03/27/2021    ALKPHOS 93 03/27/2021    AST 90 (H) 03/27/2021    ALT 15 03/27/2021    LABGLOM >60 04/14/2021    GFRAA >60 04/14/2021    GLOB NOT REPORTED 04/26/2019       Surgical Pathology Report   Surgical Pathology   Collected:  10/01/20 1600    Lab status:  Final    Resulting lab:  wooju    Value:  -- Diagnosis --   RIGHT THORACENTESIS FLUID:          METASTATIC ADENOCARCINOMA, COMPATIBLE WITH LUNG PRIMARY. IMAGING DATA:  CT chest PE   No central or segmental pulmonary embolus.         Large right pleural effusion.         Multiple masses within the right hemithorax which appear to be pleural based    and along the pericardium.  Metastatic disease is suspected      Chest x-ray 9/29/2020  Interval increase in now moderate right basilar opacity with opacification of    the inferior 1/2 of the right hemithorax, findings likely related to    increasing moderately large right pleural effusion with underlying    atelectasis/infiltrate and or mass       CT abd pelvis- 10/2/20  Impression    Scattered right pleural soft tissue nodules and mediastinal lymphadenopathy    reflecting malignancy.         Otherwise, no evidence for metastatic disease in the abdomen or pelvis. MR brain  Impression    Unremarkable MRI of brain without abnormal postcontrast enhancement.     1/21/21 CT chest abd pelvis  Impression   Significant increase in the pleural and pericardial studding in the right   hemithorax with large confluent soft tissue masses with only small areas of   pleural sparing along the upper anterolateral pleural margin.       Loculated moderate right pleural effusion that remains simple fluid in   attenuation with a basilar pleural drain in place.       Linear opacities extending from the right suprahilar region to the right lung   apex with surrounding mild ground-glass and interstitial prominence are not   significantly changed relative to prior, but there is slight increase in the   soft tissue fullness about the right hilum.       No findings of metastatic disease below the diaphragm. PMH:  Past Medical History:   Diagnosis Date    Actinic keratosis 5/17/2019    Arthritis     Dyspnea     Essential hypertension 9/28/2018    Fall     General weakness 3/27/2021    H/O head and neck radiation 11/13/2018    Hearing loss     History of malignant neoplasm of thyroid 6/20/2017    History of prostate cancer 11/13/2018    Hypercalcemia 1/15/2021    Hyperlipidemia     Hypertension     Lung cancer ([address] 10/2020    stage 4, cells in pleural space.  Lung mass 9/30/2020    Malignant pleural effusion     RIGHT    Moderate protein-calorie malnutrition ([address] 3/22/2021    Neoplasm of uncertain behavior of skin 5/17/2019    On supplemental oxygen therapy     Osteoarthritis of both hips 11/13/2018    Osteoarthrosis 6/20/2017    Pleural effusion 9/30/2020    Postoperative hypothyroidism 11/13/2018    Primary adenocarcinoma of right lung ([address] 10/15/2020    Prostate cancer ([address] 1992 approx    treated surgically    Sepsis ([address] 3/28/2021    Thyroid cancer ([address] 2017    treated surgically and with radiation    Thyroid disease       Allergies: No Known Allergies     Assessment    1. Non-small cell cancer, right-sided: Large right-sided pleural effusion with multiple pleural-based masses present suspicion for metastatic disease, cytology from thoracentesis showed adenocarcinoma. His cancer negative for EGFR, ALK, ROS1, RET mutations. ,  But positive for BRAF V6 100 E mutation. PD-L1 expression positive with TPS > 1%. CT abdomen and MRI brain negative for metastasis.   Currently he ws started on single agent Keytruda and completed 3 cycles so far. Restaging scans showed progression. Now started on Tafinlar plus Mekinist  2. Pericardial lymphadenopathy  3. Large right-sided pleural effusion: Status post Pleurx catheter placement  4. Prostrate cancer 28 years ago status post resection   5. Thyroid cancer 4 years ago status post resection and radiotherapy  Plan   I reviewed the recent lab work, imaging studies, diagnosis, goals of care and also discussed treatment options with patient and family   Currently he is on Tafinlar 75 mg twice daily instead of 150 mg twice daily. He is struggling with worsening fatigue and frequent falls  Today he looks dehydrated and I will arrange for IV hydration  I will get restaging scans in 3 weeks  I will get his CBC, CMP and iron studies in 3 weeks  Return to clinic in 3 weeks    Kev Maldonado MD  Hematologist/Medical Oncologist    This note is created with the assistance of a speech recognition program.  While intending to generate a document that actually reflects the content of the visit, the document can still have some errors including those of syntax and sound a like substitutions which may escape proof reading. It such instances, actual meaning can be extrapolated by contextual diversion.

## 2021-04-18 PROBLEM — K56.7 ILEUS (HCC): Status: ACTIVE | Noted: 2021-01-01

## 2021-04-18 NOTE — ED NOTES
Bed: ER06  Expected date: 4/18/21  Expected time: 4:25 PM  Means of arrival: Ambulance  Comments:  [facility] X Sukh Nielsen, EMT-P  04/18/21 1029

## 2021-04-18 NOTE — ED PROVIDER NOTES
[address]      Pt Name: Hayder Mckenzie  MRN: [id number]  [facility] 7/2/1932  Date of evaluation: 4/18/2021      CHIEF COMPLAINT       Chief Complaint   Patient presents with    Shaking         HISTORY OF PRESENT ILLNESS      The patient presents to the emergency department with shaking and generalized weakness. He says this morning he could not take his pills because he was so shaky. He is at [facility] in an assisted living facility. He has fallen a couple times in the past month but did not fall today. He denies pain. He denies headache. He denies chest pain or shortness of breath. He is undergoing treatment for lung cancer. He sometimes wears oxygen but is not requiring it currently. He has not had nausea or vomiting but he says he has had loose stools today. He denies focal weakness or numbness but says he feels generally weak. His daughter who accompanies him says he was actually having spasms of his upper and lower extremitiesand his jaw today, to the point where he could not function or even take his pills. She reports that he has been on Lasix recently because he had a lot of edema when he was in the hospital.  That has gotten better. He is on Keflex currently. REVIEW OF SYSTEMS       All systems reviewed and negative unless noted in HPI. The patient denies fever or constitutional symptoms. Denies vision change. Denies any sore throat or rhinorrhea. Denies any neck pain or stiffness. Denies chest pain or shortness of breath. History of lung cancer. Denies nausea or vomiting. Denies abdominal pain. Reports diarrhea today. Denies any dysuria. Denies urinary frequency or hematuria. Denies musculoskeletal injury or pain. Denies any weakness, numbness or focal neurologic deficit. Reports generalized weakness and tremor. Edema in legs. No recent psychiatric issues. No easy bruising or bleeding.    Denies any polyuria, polydypsia or history of immunocompromise. PAST MEDICAL HISTORY    has a past medical history of Actinic keratosis, Arthritis, Dyspnea, Essential hypertension, Fall, General weakness, H/O head and neck radiation, Hearing loss, History of malignant neoplasm of thyroid, History of prostate cancer, Hypercalcemia, Hyperlipidemia, Hypertension, Lung cancer ([address] Lung mass, Malignant pleural effusion, Moderate protein-calorie malnutrition ([address] Neoplasm of uncertain behavior of skin, On supplemental oxygen therapy, Osteoarthritis of both hips, Osteoarthrosis, Pleural effusion, Postoperative hypothyroidism, Primary adenocarcinoma of right lung ([address] Prostate cancer ([address] Sepsis ([address] Thyroid cancer ([address] and Thyroid disease. SURGICAL HISTORY      has a past surgical history that includes Prostatectomy; Thyroidectomy; Carpal tunnel release (Bilateral); Mouth surgery; and IR INSERT TUNNELED PLEURA CATH W CUFF (10/19/2020). CURRENT MEDICATIONS       Previous Medications    DABRAFENIB MESYLATE 75 MG CAPS    Take 75 mg by mouth 2 times daily    GLUCOS-CHOND-HYAL AC-CA FRUCTO (MOVE FREE JOINT HEALTH ADVANCE PO)    Take 1 tablet by mouth daily    HYDROXYZINE (ATARAX) 25 MG TABLET    Take 1 tablet by mouth every 4 hours as needed for Anxiety (tremors)    LEVOTHYROXINE (SYNTHROID) 100 MCG TABLET    TAKE 1 TABLET BY MOUTH EVERY DAY    LORATADINE (CLARITIN) 10 MG TABLET    TAKE 1 TABLET BY MOUTH DAILY    MELOXICAM (MOBIC) 15 MG TABLET    Take 1 tablet by mouth daily    NYSTATIN (MYCOSTATIN) 969561 UNIT/GM POWDER    Apply 2 times daily. ONDANSETRON (ZOFRAN) 4 MG TABLET    Take 1 tablet by mouth 3 times daily as needed for Nausea or Vomiting    SIMVASTATIN (ZOCOR) 40 MG TABLET    TAKE 1 TABLET BY MOUTH EVERY NIGHT    TRAMETINIB DIMETHYL SULFOXIDE (MEKINIST) 2 MG TABS    Take 2 mg by mouth daily       ALLERGIES     has No Known Allergies.     FAMILY HISTORY     He indicated that the status of his father is unknown. He indicated that the status of his brother is unknown.     family history includes Diabetes in his brother; Prostate Cancer in his father. SOCIAL HISTORY      reports that he quit smoking about 52 years ago. His smoking use included cigarettes. He has a 20.00 pack-year smoking history. He has never used smokeless tobacco. He reports previous alcohol use. He reports that he does not use drugs. PHYSICAL EXAM     INITIAL VITALS:  height is 5' 9\" (1.753 m) and weight is 59 kg (130 lb). His oral temperature is 97.5 °F (36.4 °C). His blood pressure is 134/65 and his pulse is 90. His respiration is 22 and oxygen saturation is 95%. The patient is alert and oriented, in no apparent distress. HEENT is atraumatic. Pupils are PERRL at 4 mm with normal extraocular motion. Mucous membranes moist.    Neck is supple with no lymphadenopathy. No JVD. No meningismus. Heart sounds regular rate and rhythm with no gallops, murmurs, or rubs. Lungs: Moist cough with rhonchorous sounds noted. Abdomen: soft, nontender with no pain to palpation. No pulsatile mass. Normal bowel sounds are noted. No rebound or guarding. Musculoskeletal exam shows no evidence of acute trauma. Normal distal pulses in all extremities. Skin: 2+ edema in left lower extremity, all 1+ edema in right. Neurological exam reveals cranial nerves 2 through 12 grossly intact. Patient has equal  and normal deep tendon reflexes. Mild tremor noted in upper extremities. Psychiatric: no hallucinations or suicidal ideation. Lymphatics.:  No lymphadenopathy. DIFFERENTIAL DIAGNOSIS/ MDM:     Generalized weakness, dehydration, arrhythmia, tremor, CVA, ACS    DIAGNOSTIC RESULTS     EKG: All EKG's are interpreted by the Emergency Department Physician who either signs or Co-signs this chart in the absence of a cardiologist.    A. fib 96 with nonspecific ST change. No morphologic change compared to December.   Jordan 83, QRS 92, . RADIOLOGY:   I reviewed the radiologist interpretations:  XR ACUTE ABD SERIES CHEST 1 VW   Final Result   Small right pleural effusion. Ileus. XR ACUTE ABD SERIES CHEST 1 VW (Final result)  Result time 04/18/21 17:33:16  Final result by Epifanio Travis MD (04/18/21 17:33:16)                Impression:    Small right pleural effusion.  Ileus.              Narrative:    EXAMINATION:   TWO XRAY VIEWS OF THE ABDOMEN AND SINGLE  XRAY VIEW OF THE CHEST     4/18/2021 5:18 pm     COMPARISON:   Chest x-ray March 27, 2021     HISTORY:   ORDERING SYSTEM PROVIDED HISTORY: tympany; cough   TECHNOLOGIST PROVIDED HISTORY:   tympany; cough   Reason for Exam: tympany;cough   Acuity: Acute   Type of Exam: Initial     FINDINGS:   Small right effusion.  Left lung clear.  Heart and mediastinum normal.  No   pneumothorax.  Chest tube right lung base.  Gas-filled loops of small and   large bowel.  Osseous structures normal.  No free air.                       LABS:  Results for orders placed or performed during the hospital encounter of 04/18/21   CBC Auto Differential   Result Value Ref Range    WBC 6.3 3.5 - 11.0 k/uL    RBC 3.37 (L) 4.5 - 5.9 m/uL    Hemoglobin 9.5 (L) 13.5 - 17.5 g/dL    Hematocrit 29.6 (L) 41 - 53 %    MCV 87.7 80 - 100 fL    MCH 28.3 26 - 34 pg    MCHC 32.3 31 - 37 g/dL    RDW 16.0 (H) 12.5 - 15.4 %    Platelets 368 723 - 178 k/uL    MPV 7.7 6.0 - 12.0 fL    NRBC Automated NOT REPORTED per 100 WBC    Differential Type NOT REPORTED     Seg Neutrophils 75 (H) 36 - 66 %    Lymphocytes 11 (L) 24 - 44 %    Monocytes 11 2 - 11 %    Eosinophils % 1 1 - 4 %    Basophils 2 0 - 2 %    Immature Granulocytes NOT REPORTED 0 %    Segs Absolute 4.80 1.8 - 7.7 k/uL    Absolute Lymph # 0.70 (L) 1.0 - 4.8 k/uL    Absolute Mono # 0.70 0.1 - 1.2 k/uL    Absolute Eos # 0.10 0.0 - 0.4 k/uL    Basophils Absolute 0.10 0.0 - 0.2 k/uL    Absolute Immature Granulocyte NOT REPORTED 0.00 - 0.30 k/uL    WBC Morphology NOT REPORTED     RBC Morphology NOT REPORTED     Platelet Estimate NOT REPORTED    Basic Metabolic Panel   Result Value Ref Range    Glucose 121 (H) 70 - 99 mg/dL    BUN 14 8 - 23 mg/dL    CREATININE 0.53 (L) 0.70 - 1.20 mg/dL    Bun/Cre Ratio NOT REPORTED 9 - 20    Calcium 7.6 (L) 8.6 - 10.4 mg/dL    Sodium 134 (L) 135 - 144 mmol/L    Potassium 3.7 3.7 - 5.3 mmol/L    Chloride 99 98 - 107 mmol/L    CO2 28 20 - 31 mmol/L    Anion Gap 7 (L) 9 - 17 mmol/L    GFR Non-African American >60 >60 mL/min    GFR African American >60 >60 mL/min    GFR Comment          GFR Staging NOT REPORTED    Brain Natriuretic Peptide   Result Value Ref Range    Pro- <300 pg/mL    BNP Interpretation Pro-BNP Reference Range:    Hepatic Function Panel   Result Value Ref Range    Albumin 2.6 (L) 3.5 - 5.2 g/dL    Alkaline Phosphatase 102 40 - 129 U/L    ALT 6 5 - 41 U/L    AST 18 <40 U/L    Total Bilirubin 0.22 (L) 0.3 - 1.2 mg/dL    Bilirubin, Direct 0.12 <0.31 mg/dL    Bilirubin, Indirect 0.10 0.00 - 1.00 mg/dL    Total Protein 5.1 (L) 6.4 - 8.3 g/dL    Globulin NOT REPORTED 1.5 - 3.8 g/dL    Albumin/Globulin Ratio 1.0 1.0 - 2.5   Troponin   Result Value Ref Range    Troponin, High Sensitivity 45 (H) 0 - 22 ng/L    Troponin T NOT REPORTED <0.03 ng/mL    Troponin Interp NOT REPORTED    Lactate, Sepsis   Result Value Ref Range    Lactic Acid, Sepsis 1.8 0.5 - 1.9 mmol/L    Lactic Acid, Sepsis, Whole Blood NOT REPORTED 0.5 - 1.9 mmol/L         EMERGENCY DEPARTMENT COURSE:   Vitals:    Vitals:    04/18/21 1633 04/18/21 1639 04/18/21 1757 04/18/21 1827   BP: (!) 168/92   134/65   Pulse:  92  90   Resp: 18  24 22   Temp: 97.5 °F (36.4 °C)      TempSrc: Oral      SpO2: 97%  96% 95%   Weight: 59 kg (130 lb)      Height: 5' 9\" (1.753 m)        -------------------------  BP: 134/65, Temp: 97.5 °F (36.4 °C), Pulse: 90, Resp: 22      Re-evaluation Notes    The patient appears to have a small pleural effusion.   He is not having significant tremors or seizure activity. He does have an ileus. I think he would benefit from nursing home placement after admission. I discussed the case with Dr. Louie Urban. The patient is admitted in stable condition. CONSULTS:    1  Dr. Louie Urban paged. [phone]  Discussed with Dr. Louie Urban. Will admit. FINAL IMPRESSION      1. Pleural effusion    2. Ileus ([address]    3. Coarse tremor          DISPOSITION/PLAN   DISPOSITION        Condition on Disposition    stable    PATIENT REFERRED TO:  No follow-up provider specified.     DISCHARGE MEDICATIONS:  New Prescriptions    No medications on file       (Please note that portions of this note were completed with a voice recognition program.  Efforts were made to edit the dictations but occasionally words are mis-transcribed.)    Norris MD   Attending Emergency Physician         Trudy Herron MD  04/18/21 7425

## 2021-04-18 NOTE — PROGRESS NOTES
RT paged to the ER to administer Aerosol therapy . Albuterol Aerosol given via mask tx . 02 sat on room air 95-96% . Lung fields auscultated for rhonchi and fine rales . Congested cough noted , no sputum production .

## 2021-04-18 NOTE — ED NOTES
Pt arrives via EMS. Pt AOx4. Pt arrives from [facility]. Sent to ED from tremors. Pt states he was unable to take morning meds today d/t weakness/shaking. Pt arrives on O2 but states he only uses this PRN.  Removed- pt SpO2 WNL     Jemima Fitzpatrick RN  04/18/21 6066

## 2021-04-19 NOTE — FLOWSHEET NOTE
Writer received referral from [place], [address] [place] [address], to visit Patient. Patient appeared to be sleeping. Writer offered silent prayer and left her business card. Writer will attempt to visit Patient at another time.        04/19/21 1450   Encounter Summary   Services provided to: Patient not available   Referral/Consult From: Nurse   Continue Visiting   (4/19/21)   Complexity of Encounter Low   Length of Encounter 15 minutes   Routine   Type Initial   Assessment Sleeping   Intervention Sustaining presence/ Ministry of presence;Prayer   Outcome Did not respond     Electronically signed by Alexandrea You, Oncology Out[facility], [id number] [facility] Radiation Oncology  4/19/2021  2:51 PM

## 2021-04-19 NOTE — PROGRESS NOTES
Comprehensive Nutrition Assessment    Type and Reason for Visit:  Initial, Positive Nutrition Screen, Wound(N/V, diarrhea, difficulty swallowing, poor appetite, poor intake)    Nutrition Recommendations/Plan:   1. Send ONS at meals to help maximize nutritional intakes  2. Offer snacks as needed between meals  3. Consider appetite stimulant  4. Recommend daily multivitamin    Nutrition Assessment:  Pt moderately malnourished AEB mild-moderate loss of subcutaneous fat and muscle mass with 17% wt loss over the past 4 months. Admission for weakness, intermittant tremors and ileus. Pt c/o loss of taste, early satiety and loss of appetite which is affecting adequate po intakes. Also states he has some difficulty swallowing r/t past radiation (this is not new)  Denies any nausea/vomiting and no BM since admission. Pt also noted to have some intermittant tremors. No modifications needed to diet per pt. States he takes small bites and takes his time for best tolerance. He takes a high calorie/protein nutritional supplement at breakfast.  It was recommended to him last admission that he increase his frequency of supplement to at least 2 per day. Discussed increased needs due to disease process and to help maintain wt/preserve muscle mass. Will send ONS at meals and monitor adequacy of nutritional intakes. Malnutrition Assessment:  Malnutrition Status:   Moderate malnutrition    Context:  Chronic Illness     Findings of the 6 clinical characteristics of malnutrition:  Energy Intake:  7 - 75% or less estimated energy requirements for 1 month or longer  Weight Loss:  7 - Greater than 7.5% over 3 months     Body Fat Loss:  1 - Mild body fat loss Orbital, Fat Overlying Ribs, Triceps   Muscle Mass Loss:  1 - Mild muscle mass loss Temples (temporalis), Clavicles (pectoralis & deltoids), Hand (interosseous)  Fluid Accumulation:  1 - Mild Extremities   Strength:  Not Performed    Estimated Daily Nutrient Needs:  Energy (kcal):  5934-8773 kcal/day; Weight Used for Energy Requirements:  Current     Protein (g):   g/day(1.2-1.4 g/kg); Weight Used for Protein Requirements:  Ideal        Fluid (ml/day):  1464-6894 ml/day; Method Used for Fluid Requirements:  1 ml/kcal      Nutrition Related Findings:  altered skin integrity, tremors, moderate fat/muscle wasting, poor appetite, loss of taste , dysphagia, early satiety,immunotherapy, pleural effusion, KUB      Wounds:  Pressure Injury       Current Nutrition Therapies:    DIET GENERAL; Anthropometric Measures:  · Height: 5' 9\" (175.3 cm)  · Current Body Weight: 137 lb (62.1 kg)   · Admission Body Weight: 130 lb (59 kg)    · Usual Body Weight: 165 lb (74.8 kg)(12/20)     · Ideal Body Weight: 160 lbs; % Ideal Body Weight 85.6 %   · BMI: 20.2  · Adjusted Body Weight:  ; No Adjustment   · BMI Categories: Underweight (BMI less than 22) age over 72       Nutrition Diagnosis:   · Moderate malnutrition, In context of chronic illness related to catabolic illness as evidenced by weight loss greater than or equal to 10% in 6 months    · Inadequate oral intake related to swallowing difficulty, altered taste perception, early satiety as evidenced by poor intake prior to admission, BMI      Nutrition Interventions:   Food and/or Nutrient Delivery:  Continue Current Diet  Nutrition Education/Counseling:  Education initiated(need for nutritional supplementation)   Coordination of Nutrition Care:  Continue to monitor while inpatient, Interdisciplinary Rounds    Goals:  PO to meet >75% of needs       Nutrition Monitoring and Evaluation:   Behavioral-Environmental Outcomes:  None Identified   Food/Nutrient Intake Outcomes:  Food and Nutrient Intake, Supplement Intake  Physical Signs/Symptoms Outcomes:  Biochemical Data, Chewing or Swallowing, GI Status, Nausea or Vomiting, Nutrition Focused Physical Findings, Skin, Weight     Discharge Planning:     Too soon to determine     Electronically signed by Natan Black RD, LD on 4/19/21 at 12:13 PM EDT    Natan Black RD,LD  Clinical Dietitian  [facility]  [phone]

## 2021-04-19 NOTE — PROGRESS NOTES
Spouse  Type of Home: Apartment  Home Layout: One level  Home Access: Level entry  Bathroom Shower/Tub: Walk-in shower  Bathroom Toilet: Standard  Bathroom Equipment: Grab bars in shower, Grab bars around toilet, Built-in shower seat  Bathroom Accessibility: Accessible  Home Equipment: Rolling walker, [place] [employer] Help From: Family  ADL Assistance: Independent  Homemaking Assistance: Needs assistance(Pt reports receiving assistance from daughter for grocery shopping; facility provides laundry assistance)  Homemaking Responsibilities: No  Ambulation Assistance: Independent  Transfer Assistance: Independent  Active : No  Patient's  Info: Pt reports driving up until recently, reports daughter has been driving the past few weeks  Mode of Transportation: [employer]  Occupation: Retired  Type of occupation: personell director at [employer]  Overall Cognitive Status: Exceptions  Following Commands: Follows multistep commands with increased time  Safety Judgement: Decreased awareness of need for assistance  Problem Solving: Assistance required to generate solutions  Insights: Decreased awareness of deficits  Cognition Comment: . Objective     Observation/Palpation  Posture: Poor    AROM RLE (degrees)  RLE AROM: WFL  AROM LLE (degrees)  LLE AROM : WFL  AROM RUE (degrees)  RUE AROM : Exceptions  RUE General AROM: WFL except right shoulder limited to 70 degrees of abduction, flexion. AROM LUE (degrees)  LUE AROM : WFL  Strength RLE  Strength RLE: WFL  Comment: Grossly 4/5  Strength LLE  Strength LLE: WFL  Comment: Grossly 4/5  Strength RUE  Comment: Co-evaluation with OT, see OT note for UE detail  Strength LUE  Comment: Co-evaluation with OT, see OT note for UE detail     Sensation  Overall Sensation Status: WFL(pt denies any numbness/tingling)  Bed mobility  Comment: pt began today's session up in a chair and retired up to a chair at the conclusion of today's session.   Transfers  Sit to Stand: Contact guard assistance  Stand to sit: Contact guard assistance  Comment: increased time/effort to perform. Verbal cues for hand placement. Ambulation  Ambulation?: Yes  More Ambulation?: No  Ambulation 1  Surface: level tile  Device: Rolling Walker  Assistance: Minimal assistance  Quality of Gait: Pt with decreased gait speed, decreased step length, shuffling gait  Gait Deviations: Slow Angie;Decreased step length;Decreased step height;Shuffles  Distance: 20 feet, standing rest break, 15 feet  Comments: Pt requires verbal cues to maintain safe proximity to RW, physical min-A and verbal cues to perform safe turns without loss of balance. Stairs/Curb  Stairs?: No     Balance  Posture: Poor  Sitting - Static: Good;-  Sitting - Dynamic: Good;-  Standing - Static: Fair;  Standing - Dynamic: Fair;-  Comments: standing balance assessed with a RW        Plan   Plan  Times per week: 5-6  Times per day: Daily  Current Treatment Recommendations: Strengthening, Transfer Training, Endurance Training, ROM, Balance Training, Gait Training, Functional Mobility Training, Stair training, Safety Education & Training, Home Exercise Program, Patient/Caregiver Education & Training  Safety Devices  Type of devices: Patient at risk for falls, Left in chair, Call light within reach, Chair alarm in place, Gait belt  Restraints  Initially in place: No                          AM-PAC Score     AM-PAC Inpatient Mobility without Stair Climbing Raw Score : 16 (04/19/21 1451)  AM-PAC Inpatient without Stair Climbing T-Scale Score : 45.54 (04/19/21 1451)  Mobility Inpatient CMS 0-100% Score: 40.64 (04/19/21 1451)  Mobility Inpatient without Stair CMS G-Code Modifier : CK (04/19/21 1451)       Goals  Short term goals  Time Frame for Short term goals: 14 visits  Short term goal 1: Pt will ambulate 170 feet with a RW with supervision to increase functional independence.   Short term goal 2: Pt will perform sit<>stand transfer with independence to increase functional independence. Short term goal 3: Pt will demonstrate good-standing balance to decrease fall risk. Short term goal 4: Pt will tolerate a 30 minute therapy session to increase endurance. Short term goal 5: Pt will negotiate 1 stair with a RW with supervision to increase capacity for community mobility. Therapy Time   Individual Concurrent Group Co-treatment   Time In 1329         Time Out 1405         Minutes 36         Timed Code Treatment Minutes: 8 Minutes       ALYSSA Martines  Evaluation/treatment performed by Student PT under the supervision of co-signing PT who agrees with all evaluation/treatment and documentation.

## 2021-04-19 NOTE — PLAN OF CARE
Nutrition Problem #1: Moderate malnutrition, In context of chronic illness  Intervention: Food and/or Nutrient Delivery: Continue Current Diet  Nutritional Goals: PO to meet >75% of needs

## 2021-04-19 NOTE — PROGRESS NOTES
Pt admitted to room 326 from ER. Oriented to room and call light/tv controls. Bed in lowest position, wheels locked, 2/4 side rails up  Call light in reach, room free of clutter, adequate lighting provided. Admission database completed with patient and Glendy(daughter). Vital signs and assessment as charted.

## 2021-04-19 NOTE — PROGRESS NOTES
RN messaged Dr. Diana Green via perfect serve the following: In the ED they obtained an EKG and it shows a. fib with a ventricular rate of 96bpm. He doesn't have a history in the epic. I asked the patient and his daughter and they both say he has never had a.fib. Do you want cardiology consulted? He doesn't have a current cardiologist that he see's. The ED states his oncologist is Dr. Sánchez Lee. Do you want him consulted? His x-ray in the ED showed a Rt. Plueral effusion. He has expiratory wheezing on the rt chest and crackles bilaterally Rt greater than Lt. The x-ray also shows an Ileus. Do you want him NPO or to have IV fluids. His daughter says he has not been eating or drinking hardly anything d/t decreased appetite and the patient confirmed this. And patient complains of diarrhea x 3 weeks. ED troponin 45 in the ED do you want it repeated? His calcium was 7.6. Do you want this replaced? ED gave patient Ativan 0.5mg IV. Do you want Ativan prn for tremors? 2418 Geo Green replies no troponin, no calcium, his albumin will be low. Dr. Diana Green started entering additional orders intermittently. See orders. 2238 RN asking if patient needs higher dose of lovenox than 40mg QD for new a.fib of unknown length of time. Dr. Diana Green replies no. 2240 No order or answer to question regarding continuous IV fluids from original message. RN re-asks if patient needs continuous IV fluids. [facility] Dr. Diana Green replies I already put the orders in that I wanted. If you have something emergent that you need please let me know.

## 2021-04-19 NOTE — CARE COORDINATION
Case Management Initial Discharge Plan  Thermon [place],              Met with:patient to discuss discharge plans. Information verified: address, contacts, phone number, , insurance Yes    Emergency Contact/Next of Kin name & number: toi Carey - [phone]    PCP: LO Lafleur CNP  Date of last visit: 2021 at [facility] Provider: [facility]    Discharge Planning    Living Arrangements:  Spouse/Significant Other   Support Systems:  Spouse/Significant Other    Lives at [address]  Patient able to perform ADL's:Assisted    Current Services (outpatient & in home) lives in assisted living  DME equipment:   DME provider:     Receiving oral anticoagulation therapy? No    If indicated:   Physician managing anticoagulation treatment:   Where does patient obtain lab work for ATC treatment? Potential Assistance Needed:  Transportation, Ganesh Irwin    Patient agreeable to home care: No  [place] of choice provided:  no    Prior SNF/Rehab Placement and Facility: no  Agreeable to SNF/Rehab: No  [place] of choice provided: no     Evaluation: no    Expected Discharge date:  21    Patient expects to be discharged to:  return to [place]  Follow Up Appointment: Best Day/ Time:      Transportation provider: self, daughter  Transportation arrangements needed for discharge: No    Readmission Risk              Risk of Unplanned Readmission:        14           Does patient have a readmission risk score greater than 14?: No  If yes, follow-up appointment must be made within 7 days of discharge.      Goals of Care: new shaking and tremors      Discharge Plan: following plan as it develops - lives at [address] [facility] with spouse - await therapy notes for skilled needs          Electronically signed by Hector Quan RN on 21 at 9:00 AM EDT

## 2021-04-19 NOTE — PLAN OF CARE
Problem: Falls - Risk of:  Goal: Will remain free from falls  Outcome: Ongoing   Fall assessment preformed. Bed in low locked position with call light and tray table within reach. Education given. Will continue to monitor. Problem: Skin Integrity:  Goal: Will show no infection signs and symptoms  Outcome: Ongoing   Skin assessment preformed. Pt turned every 2 hours with heals elevated off bed. Waffle mattress in place. Will continue to monitor. Problem: Fluid Volume:  Goal: Signs and symptoms of dehydration will decrease  Outcome: Ongoing  Strict I and 0    Problem: Nutrition  Goal: Optimal nutrition therapy  Outcome: Ongoing   Dietitian on board. Education given on the importance of maintaining and checking blood sugars. Reviewed and re-educated on current diabetic medication and low carb diet. Patient verbalizes understanding.

## 2021-04-19 NOTE — PROGRESS NOTES
Essential hypertension, Fall, General weakness, H/O head and neck radiation, Hearing loss, History of malignant neoplasm of thyroid, History of prostate cancer, Hypercalcemia, Hyperlipidemia, Hypertension, Lung cancer ([address] Lung mass, Malignant pleural effusion, Moderate protein-calorie malnutrition ([address] Neoplasm of uncertain behavior of skin, On supplemental oxygen therapy, Osteoarthritis of both hips, Osteoarthrosis, Pleural effusion, Postoperative hypothyroidism, Primary adenocarcinoma of right lung ([address] Prostate cancer ([address] Sepsis ([address] Thyroid cancer ([address] and Thyroid disease. has a past surgical history that includes Prostatectomy; Thyroidectomy; Carpal tunnel release (Bilateral); Mouth surgery; and IR INSERT TUNNELED PLEURA CATH W CUFF (10/19/2020). Restrictions  Restrictions/Precautions  Restrictions/Precautions: Up as Tolerated, Fall Risk, Contact Precautions(c-diff precautions)  Required Braces or Orthoses?: No  Position Activity Restriction  Other position/activity restrictions: Up with assistance; pending c-diff rule out    Subjective   General  Patient assessed for rehabilitation services?: Yes  Family / Caregiver Present: No  General Comment  Comments: RN ok'd for OT Eval  Patient Currently in Pain: Yes  Pain Assessment  Pain Assessment: 0-10  Pain Level: 5  Pain Type: Acute pain  Pain Location: Shoulder  Pain Orientation: Right  Pain Descriptors: Velinda Mantle; Shooting  Functional Pain Assessment: Prevents or interferes some active activities and ADLs  Non-Pharmaceutical Pain Intervention(s): Distraction;Repositioned; Emotional support  Response to Pain Intervention: Patient Satisfied    Social/Functional History  Social/Functional History  Lives With: Spouse  Type of Home: Apartment  Home Layout: One level  Home Access: Level entry  Bathroom Shower/Tub: Walk-in shower  Bathroom Toilet: Standard  Bathroom Equipment: Grab bars in shower, Grab bars around toilet, Built-in shower seat  Bathroom will demo Mod IND with least restrictive AD to perform functional mobility/transfers during ADLs with no LOB  Short term goal 2: Pt will demo 10+ minutes of activity tolerance to increase participation in basic ADLs  Short term goal 3: Pt will demo UB/LB ADLs w/SBA, requiring 3 or less verbal/tactile cues with the use of AE/DME PRN  Short term goal 4: Pt will demo BUE HEP for improved strength and activity tolerance during functional task performance  Short term goal 5: Pt will independently demo good safety awareness throughtout engagment in all ADL/functionl tasks  Patient Goals   Patient goals : 14 visits       Therapy Time   Individual Concurrent Group Co-treatment   Time In 1328         Time Out 1407         Minutes 39         Timed Code Treatment Minutes: [address]/OT

## 2021-04-19 NOTE — PLAN OF CARE
Problem: Falls - Risk of:  Siderails up x 2  Hourly rounding  Call light in reach  Instructed to call for assist before attempting out of bed. Remains free from falls and accidental injury at this time   Floor free from obstacles  Bed is locked and in lowest position  Adequate lighting provided  Bed alarm on, [place] star and Stay with Me signs posted      Goal: Will remain free from falls  Description: Will remain free from falls  Outcome: Ongoing  Goal: Absence of physical injury  Description: Absence of physical injury  Outcome: Ongoing     Problem: Skin Integrity:  Checked for incontinence every 2 hours and prn. Pericare as needed. Assisted to reposition off back frequently. Heels off bed with pillows.     Goal: Will show no infection signs and symptoms  Description: Will show no infection signs and symptoms  Outcome: Ongoing  Goal: Absence of new skin breakdown  Description: Absence of new skin breakdown  Outcome: Ongoing     Problem: Fluid Volume:  Goal: Signs and symptoms of dehydration will decrease  Description: Signs and symptoms of dehydration will decrease  Outcome: Ongoing  Goal: Ability to achieve a balanced intake and output will improve  Description: Ability to achieve a balanced intake and output will improve  Outcome: Ongoing  Goal: Diagnostic test results will improve  Description: Diagnostic test results will improve  Outcome: Ongoing     Problem: Physical Regulation:  Goal: Complications related to the disease process, condition or treatment will be avoided or minimized  Description: Complications related to the disease process, condition or treatment will be avoided or minimized  Outcome: Ongoing  Goal: Ability to maintain vital signs within normal range will improve  Description: Ability to maintain vital signs within normal range will improve  Outcome: Ongoing

## 2021-04-19 NOTE — H&P
 IR INSERT TUNNELED PLEURA CATH W CUFF  10/19/2020    IR INSERT TUNNELED PLEURA CATH W CUFF 10/19/2020 Jacqulyne Homans, MD [facility]    MOUTH SURGERY      dental repairs    PROSTATECTOMY      THYROIDECTOMY         Medications Prior to Admission:    Prior to Admission medications    Medication Sig Start Date End Date Taking? Authorizing Provider   loratadine (CLARITIN) 10 MG tablet TAKE 1 TABLET BY MOUTH DAILY 4/12/21   LO Love CNP   hydrOXYzine (ATARAX) 25 MG tablet Take 1 tablet by mouth every 4 hours as needed for Anxiety (tremors) 4/5/21   LO Love CNP   nystatin (MYCOSTATIN) 853274 UNIT/GM powder Apply 2 times daily. Patient not taking: Reported on 4/16/2021 4/5/21   LO Love CNP   Dabrafenib Mesylate 75 MG CAPS Take 75 mg by mouth 2 times daily 3/26/21   Addy Lam MD   Trametinib Dimethyl Sulfoxide (MEKINIST) 2 MG TABS Take 2 mg by mouth daily 3/26/21   Addy Lam MD   ondansetron (ZOFRAN) 4 MG tablet Take 1 tablet by mouth 3 times daily as needed for Nausea or Vomiting 3/12/21   Addy Lam MD   meloxicam (MOBIC) 15 MG tablet Take 1 tablet by mouth daily  Patient taking differently: Take 15 mg by mouth nightly  2/15/21   LO Love CNP   levothyroxine (SYNTHROID) 100 MCG tablet TAKE 1 TABLET BY MOUTH EVERY DAY 1/26/21   LO Love CNP   simvastatin (ZOCOR) 40 MG tablet TAKE 1 TABLET BY MOUTH EVERY NIGHT 10/19/20   LO Love CNP   Glucos-Chond-Hyal Ac-Ca Fructo (MOVE FREE JOINT HEALTH ADVANCE PO) Take 1 tablet by mouth daily    Historical Provider, MD       Allergies:  Patient has no known allergies. Social History:   TOBACCO:   reports that he quit smoking about 52 years ago. His smoking use included cigarettes. He has a 20.00 pack-year smoking history. He has never used smokeless tobacco.  ETOH:   reports previous alcohol use.   OCCUPATION:      Family History:       Problem Relation Age of Onset    Prostate Cancer Father     Diabetes Brother        REVIEW OF SYSTEMS:  Negative except for as above- unable to do ROS with pt as he is sleeping soundly. Physical Exam:    Vitals: /69   Pulse 90   Temp 98.2 °F (36.8 °C) (Oral)   Resp 18   Ht 5' 9\" (1.753 m)   Wt 137 lb 2 oz (62.2 kg)   SpO2 95%   BMI 20.25 kg/m²   General appearance: sleeping, appears stated age and cooperative  Skin: Skin color, texture, turgor normal. No rashes or lesions  HEENT: Head: Normocephalic, no lesions, without obvious abnormality. Neck: supple, symmetrical, trachea midline  Lungs: clear to auscultation bilaterally except few upper rhonchi  Heart: irregularly irregular rhythm  Abdomen: normal findings: bowel sounds normal and soft, non-tender  Extremities: some edema  Neurologic: Mental status: sleeping soundly    CBC:   Recent Labs     04/18/21  1644 04/19/21  0520   WBC 6.3 4.4   HGB 9.5* 8.0*    241     BMP:    Recent Labs     04/18/21  1644 04/19/21  0520   * 138   K 3.7 3.6*   CL 99 105   CO2 28 27   BUN 14 11   CREATININE 0.53* 0.49*   GLUCOSE 121* 97     Hepatic:   Recent Labs     04/18/21  1644   AST 18   ALT 6   BILITOT 0.22*   ALKPHOS 102     Troponin: No results for input(s): TROPONINI in the last 72 hours. BNP: No results for input(s): BNP in the last 72 hours. Lipids: No results for input(s): CHOL, HDL in the last 72 hours. Invalid input(s): LDLCALCU  ABGs: No results found for: PHART, PO2ART, HSX7OXK  INR: No results for input(s): INR in the last 72 hours. -----------------------------------------------------------------  PA/lat CXR: see results  EKG: see results    Assessment and Plan   1. Shaking/ weakness- ?hyponatremia or possible hypocalc (waiting on ionized). Consult therapy and oncology. Will need skilled care. 2. Possible ileus with diarrhea- await C diff, repeat xray  3. Pleural effusion from lung cancer- stable- repeat CXR  4.  Irreg HR- EKG questioning a fib, but there are some P waves- repeat EKG. May be new onset  5. Hypothyroidism- repeat TSH    Patient Active Problem List   Diagnosis Code    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 XXXA    Malignant pleural effusion J91.0    Ileus [facility]) K56.7       Electronically signed by Lázaro Monet MD on 4/19/2021 at 7:42 AM

## 2021-04-19 NOTE — CONSULTS
_                         Today's Date: 4/19/2021  Patient Name: Jil Vee  Date of admission: 4/18/2021  4:28 PM  Patient's age: 80 y.o., 7/2/1932  Admission Dx: Ileus [facility]) [K56.7]      Requesting Physician: Myron Hagan MD    CHIEF COMPLAINT: Shaking. Consult for lung cancer. History Obtained From:  patient, electronic medical record    HISTORY OF PRESENT ILLNESS:      The patient is a 80 y.o.  male who is admitted to the hospital for further management of acute symptoms including increasing weakness and fatigue and shaking. Obviously he had vigorous body shaking for the last few days and he was unable to take his medications or even to eat properly. Patient also developed diarrhea. He was admitted and had work-up with abdominal x-rays which showed ileus. Repeated images showed significant improvement. Clinically patient is feeling much better now with no shaking. No fever or chills. Patient denies any cough sputum or hemoptysis. No urinary symptoms. Patient is known to our practice. He has stage IV metastatic right-sided adenocarcinoma. Positive pleural effusion. Patient was started on treatment with different regimens over the last 1 and half years and recently started on Tafinlar plus Mekinist.  This was started March 3, 2021. 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 Pleural fluid cytology positive for adenocarcinoma  Multiple masses in the right hemithorax which are pleural based and along the pericardium.   ECOG performance status 2  TREATMENT HISTORY:  Next-generation testing with TEMPUS is ordered  Patient planning to have Pleurx catheter placement on 10/19  Patient not a candidate for aggressive chemotherapy therefore we will plan single agent Keytruda, regardless of PD-L1 expression results  His insurance delayed the treatment approval  11/13/20: started on single agent Keytruda. Unfortunately his scan in January showed progression of disease. He has BRAF V6 [id number] mutation. Started Tafinlar plus Mekinist on 3/3/21  INTERVAL HISTORY[de-identified]   Patient is returning for follow-up visit and to discuss further recommendations. He was recently admitted to hospital on 3/27/2021 and discharged on 3/30/2021 for generalized weakness, dehydration and falls. Currently he is at assisted living and reported that he had a fall few days ago. As per the daughter patient has been having significant fatigue and fall. He does also have lower extremity swelling. He had a skin rash on his torso and his PCP has prescribed some antifungal and antibiotic and the rash is now better. Currently he is receiving Tafinlar plus Mekinist and tolerated well. He denies any nausea matting, diarrhea. He reported that his fatigue in fact is better. He denies any fever chills or shortness of breath but he does have some dyspnea on exertion. During this visit patient's allergy, social, medical, surgical history and medications were reviewed and updated.         Past Medical History:   has a past medical history of Actinic keratosis, Arthritis, Dyspnea, Essential hypertension, Fall, General weakness, H/O head and neck radiation, Hearing loss, History of malignant neoplasm of thyroid, History of prostate cancer, Hypercalcemia, Hyperlipidemia, Hypertension, Lung cancer ([address] Lung mass, Malignant pleural effusion, Moderate protein-calorie malnutrition ([address] Neoplasm of uncertain behavior of skin, On supplemental oxygen therapy, Osteoarthritis of both hips, Osteoarthrosis, Pleural effusion, Postoperative hypothyroidism, Primary adenocarcinoma of right lung ([address] Prostate cancer ([address] Sepsis ([address] Thyroid cancer ([address] and Thyroid disease. Past Surgical History:   has a past surgical history that includes Prostatectomy; Thyroidectomy;  Carpal tunnel release (Bilateral); Mouth surgery; and IR INSERT TUNNELED PLEURA CATH W CUFF (10/19/2020). Family History: family history includes Diabetes in his brother; Prostate Cancer in his father. Social History:   reports that he quit smoking about 52 years ago. His smoking use included cigarettes. He has a 20.00 pack-year smoking history. He has never used smokeless tobacco. He reports previous alcohol use. He reports that he does not use drugs. Medications:    Prior to Admission medications    Medication Sig Start Date End Date Taking? Authorizing Provider   loratadine (CLARITIN) 10 MG tablet TAKE 1 TABLET BY MOUTH DAILY 4/12/21   Nathan Rang, APRN - CNP   hydrOXYzine (ATARAX) 25 MG tablet Take 1 tablet by mouth every 4 hours as needed for Anxiety (tremors) 4/5/21   Nathan Rang, LO - CNP   nystatin (MYCOSTATIN) 842828 UNIT/GM powder Apply 2 times daily.   Patient not taking: Reported on 4/16/2021 4/5/21   Nathan Rang, APRLOIDA Alarcon CNP   Dabrafenib Mesylate 75 MG CAPS Take 75 mg by mouth 2 times daily 3/26/21   Charles Augustin MD   Trametinib Dimethyl Sulfoxide (MEKINIST) 2 MG TABS Take 2 mg by mouth daily 3/26/21   Billingslauryn Augustin MD   ondansetron (ZOFRAN) 4 MG tablet Take 1 tablet by mouth 3 times daily as needed for Nausea or Vomiting 3/12/21   Charles Augustin MD   meloxicam (MOBIC) 15 MG tablet Take 1 tablet by mouth daily  Patient taking differently: Take 15 mg by mouth nightly  2/15/21   Nathan Rang, APRN - CNP   levothyroxine (SYNTHROID) 100 MCG tablet TAKE 1 TABLET BY MOUTH EVERY DAY 1/26/21   Nathan Rang, APRN - CNP   simvastatin (ZOCOR) 40 MG tablet TAKE 1 TABLET BY MOUTH EVERY NIGHT 10/19/20   Nathan Rang, APRLOIDA - CNP   Glucos-Chond-Hyal Ac-Ca Fructo (MOVE FREE JOINT HEALTH ADVANCE PO) Take 1 tablet by mouth daily    Historical Provider, MD     Current Facility-Administered Medications   Medication Dose Route Frequency Provider Last Rate Last Admin    albuterol (PROVENTIL) nebulizer solution 2.5 mg  2.5 mg Nebulization TID Darvin Cervantes MD   2.5 mg at 04/19/21 1500    sodium chloride flush 0.9 % injection 5-40 mL  5-40 mL Intravenous 2 times per day Yulia Reyes MD   10 mL at 04/19/21 1130    enoxaparin (LOVENOX) injection 40 mg  40 mg Subcutaneous Daily Yulia Reyes MD   40 mg at 04/19/21 1129    acetaminophen (TYLENOL) tablet 650 mg  650 mg Oral Q4H PRN Yulia Reyes MD        Dabrafenib Mesylate CAPS 75 mg  75 mg Oral BID Kailyn Frye MD   Stopped at 04/19/21 2693    hydrOXYzine (ATARAX) tablet 25 mg  25 mg Oral Q4H PRN Kailyn Frye MD        levothyroxine (SYNTHROID) tablet 100 mcg  100 mcg Oral Daily Kailyn Frye MD   100 mcg at 04/19/21 1130    cetirizine (ZYRTEC) tablet 10 mg  10 mg Oral Daily Kailyn Frye MD        [facility]dansetron [facility]) tablet 4 mg  4 mg Oral TID PRN Kailyn Frye MD        atorvastatin (LIPITOR) tablet 20 mg  20 mg Oral Daily Kailyn Frye MD        Trametinib Dimethyl Sulfoxide TABS 2 mg  2 mg Oral Daily Kailyn Frye MD   Stopped at 04/19/21 6632    acetaminophen (TYLENOL) tablet 650 mg  650 mg Oral Q6H PRN Kailyn Frye MD        zolpidem [facility]) tablet 5 mg  5 mg Oral Nightly PRN Kailyn Frye MD        LORazepam (ATIVAN) injection 0.5 mg  0.5 mg Intravenous Q6H PRN Kailyn Frye MD           Allergies:  Patient has no known allergies. REVIEW OF SYSTEMS:      · General: Positive for weakness and fatigue. No unanticipated weight loss or decreased appetite. No fever or chills. · Eyes: No blurred vision, eye pain or double vision. · Ears: No hearing problems or drainage. No tinnitus. · Throat: No sore throat, problems with swallowing or dysphagia. · Respiratory: As above. · Cardiovascular: No chest pain, orthopnea or PND. No lower extremity edema. No palpitation. · Gastrointestinal: No problems with swallowing. No abdominal pain or bloating. No nausea or vomiting.  No diarrhea or constipation. No GI bleeding. · Genitourinary: No dysuria, hematuria, frequency or urgency. · Musculoskeletal: No muscle aches or pains. No limitation of movement. No back pain. No gait disturbance, No joint complaints. · Dermatologic: No skin rashes or pruritus. No skin lesions or discolorations. · Psychiatric: No depression, anxiety, or stress or signs of schizophrenia. No change in mood or affect. · Hematologic: No history of bleeding tendency. No bruises or ecchymosis. No history of clotting problems. · Infectious disease: No fever, chills or frequent infections. · Endocrine: No polydipsia or polyuria. No temperature intolerance. · Neurologic: No headaches or dizziness. No weakness or numbness of the extremities. No changes in balance, coordination,  memory, mentation, behavior. · Allergic/Immunologic: No nasal congestion or hives. No repeated infections. PHYSICAL EXAM:      /69   Pulse 79   Temp 98.4 °F (36.9 °C) (Oral)   Resp 16   Ht 5' 9\" (1.753 m)   Wt 137 lb 2 oz (62.2 kg)   SpO2 98%   BMI 20.25 kg/m²    Temp (24hrs), Av.3 °F (36.8 °C), Min:98.1 °F (36.7 °C), Max:98.4 °F (36.9 °C)      General appearance - not in pain or distress. Patient looks chronically ill and cachectic. Mental status - alert and oriented  Eyes - pupils equal and reactive, extraocular eye movements intact  Ears - bilateral TM's and external ear canals normal  Nose - normal and patent, no erythema, discharge or polyps  Mouth - mucous membranes moist, pharynx normal without lesions  Neck - supple, no significant adenopathy  Lymphatics - no palpable lymphadenopathy, no hepatosplenomegaly  Chest -decreased air entry bilaterally. No wheezing.   Heart - normal rate, regular rhythm, normal S1, S2, no murmurs, rubs, clicks or gallops  Abdomen - soft, nontender, nondistended, no masses or organomegaly  Neurological - alert, oriented, normal speech, no focal findings or movement disorder noted  Musculoskeletal - no joint tenderness, deformity or swelling  Extremities - peripheral pulses normal, no pedal edema, no clubbing or cyanosis  Skin - normal coloration and turgor, no rashes, no suspicious skin lesions noted           DATA:      Labs:       CBC:   Recent Labs     04/18/21  1644 04/19/21  0520   WBC 6.3 4.4   HGB 9.5* 8.0*   HCT 29.6* 25.1*    241     BMP:   Recent Labs     04/18/21  1644 04/19/21  0520   * 138   K 3.7 3.6*   CO2 28 27   BUN 14 11   CREATININE 0.53* 0.49*   LABGLOM >60 >60   GLUCOSE 121* 97     PT/INR: No results for input(s): PROTIME, INR in the last 72 hours. APTT:No results for input(s): APTT in the last 72 hours. LIVER PROFILE:  Recent Labs     04/18/21  1644   AST 18   ALT 6   LABALBU 2.6*     XR ABDOMEN (KUB) (SINGLE AP VIEW)  Narrative: EXAMINATION:  ONE SUPINE XRAY VIEW(S) OF THE ABDOMEN    4/19/2021 8:50 am    COMPARISON:  None. HISTORY:  ORDERING SYSTEM PROVIDED HISTORY: ileus  TECHNOLOGIST PROVIDED HISTORY:  ileus  Reason for Exam: Ileus  Acuity: Acute  Type of Exam: Ongoing    FINDINGS:  Nonspecific bowel gas pattern minimal distended loops of colon. .  No evidence  for obstruction. Decrease no bowel gas when compared to prior study  Impression: Nonspecific bowel gas pattern. Decrease in bowel gas compared to prior study  XR CHEST PORTABLE  Narrative: EXAMINATION:  ONE XRAY VIEW OF THE CHEST    4/19/2021 8:50 am    COMPARISON:  March 27, 2021, chest exam    HISTORY:  ORDERING SYSTEM PROVIDED HISTORY: pleural effusion  TECHNOLOGIST PROVIDED HISTORY:  pleural effusion  Reason for Exam: Pleural effusion  Acuity: Acute  Type of Exam: Ongoing    FINDINGS:  Stable normal cardiopericardial silhouette    Emphysematous changes of the lungs with interval decrease in still small to  moderate right pleural effusion interval increase in mild patchy right  perihilar/right basilar infiltrate.   No pneumothorax  Impression: Interval decrease in still small to moderate right pleural effusion with  increase in now mild patchy right perihilar/right basilar atelectasis versus  infiltrate            IMPRESSION:    Primary Problem  <principal problem not specified>  Stage IV right lung metastatic adenocarcinoma  Malignant pleural effusion  Dehydration  Diarrhea  Electrolytes disturbances  Ileus  RECOMMENDATIONS:  1. Records and labs and images were reviewed and discussed with the patient. 2. Currently patient is clinically much better with no more shaking. He is still quite dehydrated. 3. No clear evidence of infection or sepsis. 4. Patient is currently on treatment with Tafinlar and Mekinist.  He is scheduled for repeat his scan in few days for evaluation of response to treatment. No chemotherapy or immunotherapy will be given to patient during this hospitalization. Further care for his cancer will be as outpatient. 5. Continue current therapy. 6. Patient's questions were answered to the best of his satisfaction and he verbalized full understanding and agreement. Discussed with patient and Nurse. MD Natalie Pope [facility]                            This note is created with the assistance of a speech recognition program.  While intending to generate a document that actually reflects the content of the visit, the document can still have some errors including those of syntax and sound a like substitutions which may escape proof reading. It such instances, actual meaning can be extrapolated by contextual diversion.

## 2021-04-20 NOTE — CARE COORDINATION
Discharge [facility] Case Management Department  Written by: Sarita Mcpherson RN    Patient Name: Carol Jesus  Attending Provider: Clifford Soto MD  Admit Date: 2021  4:28 PM  MRN: [id number]  Account: [de-identified]                     : 1932  Discharge Date: 2021      Disposition: [facility] - AVS faxed to [phone] nurse given phone number for [facility] for nurse to nurse report; transport provided by patient son    Sarita Mcpherson RN

## 2021-04-20 NOTE — PLAN OF CARE
Problem: Falls - Risk of:  Goal: Will remain free from falls  Description: Will remain free from falls  4/20/2021 1418 by Doris Stark RN  Outcome: Completed  4/20/2021 0420 by Lashaun Mcdonald RN  Outcome: Ongoing  Goal: Absence of physical injury  Description: Absence of physical injury  4/20/2021 1418 by Doris Stark RN  Outcome: Completed  4/20/2021 0420 by Lashaun Mcdonald RN  Outcome: Ongoing     Problem: Skin Integrity:  Goal: Will show no infection signs and symptoms  Description: Will show no infection signs and symptoms  4/20/2021 1418 by Doris Stark RN  Outcome: Completed  4/20/2021 0420 by Lashaun Mcdonald RN  Outcome: Ongoing  Goal: Absence of new skin breakdown  Description: Absence of new skin breakdown  4/20/2021 1418 by Doris Stark RN  Outcome: Completed  4/20/2021 0420 by Lashaun Mcdonald RN  Outcome: Ongoing     Problem: Fluid Volume:  Goal: Signs and symptoms of dehydration will decrease  Description: Signs and symptoms of dehydration will decrease  4/20/2021 1418 by Doris Stark RN  Outcome: Completed  4/20/2021 0420 by Lashaun Mcdonald RN  Outcome: Ongoing  Goal: Ability to achieve a balanced intake and output will improve  Description: Ability to achieve a balanced intake and output will improve  4/20/2021 1418 by Doris Stark RN  Outcome: Completed  4/20/2021 0420 by Lashaun Mcdonald RN  Outcome: Ongoing  Goal: Diagnostic test results will improve  Description: Diagnostic test results will improve  Outcome: Completed     Problem: Physical Regulation:  Goal: Complications related to the disease process, condition or treatment will be avoided or minimized  Description: Complications related to the disease process, condition or treatment will be avoided or minimized  Outcome: Completed  Goal: Ability to maintain vital signs within normal range will improve  Description: Ability to maintain vital signs within normal range will improve  4/20/2021 1418 by Doris Stark RN  Outcome:

## 2021-04-20 NOTE — PROGRESS NOTES
Multiple attempts to call Assisted living to give report, phone would keep ringing. All discharge information faxed to assisted living.

## 2021-04-20 NOTE — PROGRESS NOTES
approval  11/13/20: started on single agent Keytruda. Unfortunately his scan in January showed progression of disease. He has BRAF V6 00E mutation. Started Tafinlar plus Mekinist on 3/3/21  INTERVAL HISTORY[de-identified]   Patient is returning for follow-up visit and to discuss further recommendations. He was recently admitted to hospital on 3/27/2021 and discharged on 3/30/2021 for generalized weakness, dehydration and falls. Currently he is at assisted living and reported that he had a fall few days ago. As per the daughter patient has been having significant fatigue and fall. He does also have lower extremity swelling. He had a skin rash on his torso and his PCP has prescribed some antifungal and antibiotic and the rash is now better. Currently he is receiving Tafinlar plus Mekinist and tolerated well. He denies any nausea matting, diarrhea. He reported that his fatigue in fact is better. He denies any fever chills or shortness of breath but he does have some dyspnea on exertion. During this visit patient's allergy, social, medical, surgical history and medications were reviewed and updated.         Past Medical History:   has a past medical history of Actinic keratosis, Arthritis, Dyspnea, Essential hypertension, Fall, General weakness, H/O head and neck radiation, Hearing loss, History of malignant neoplasm of thyroid, History of prostate cancer, Hypercalcemia, Hyperlipidemia, Hypertension, Lung cancer ([address] Lung mass, Malignant pleural effusion, Moderate protein-calorie malnutrition ([address] Neoplasm of uncertain behavior of skin, On supplemental oxygen therapy, Osteoarthritis of both hips, Osteoarthrosis, Pleural effusion, Postoperative hypothyroidism, Primary adenocarcinoma of right lung ([address] Prostate cancer ([address] Sepsis ([address] Thyroid cancer ([address] and Thyroid disease. Past Surgical History:   has a past surgical history that includes Prostatectomy; Thyroidectomy;  Carpal tunnel release Daily Samson Kathleen MD   88 mcg at 04/20/21 1013    albuterol (PROVENTIL) nebulizer solution 2.5 mg  2.5 mg Nebulization TID Samson Kathleen MD   2.5 mg at 04/20/21 0093    cetirizine (ZYRTEC) tablet 10 mg  10 mg Oral Nightly Dana Hallmark, APRN - CNP   10 mg at 04/19/21 2020    atorvastatin (LIPITOR) tablet 20 mg  20 mg Oral Nightly Dana Hallmark, APRN - CNP   20 mg at 04/19/21 2020    sodium chloride flush 0.9 % injection 5-40 mL  5-40 mL Intravenous 2 times per day Ashlie Robins MD   10 mL at 04/20/21 1013    enoxaparin (LOVENOX) injection 40 mg  40 mg Subcutaneous Daily Ashlie Robins MD   40 mg at 04/20/21 1012    acetaminophen (TYLENOL) tablet 650 mg  650 mg Oral Q4H PRN Ashlie Robins MD        Dabrafenib Mesylate CAPS 75 mg  75 mg Oral BID Tessa Talley MD   Stopped at 04/19/21 3903    hydrOXYzine (ATARAX) tablet 25 mg  25 mg Oral Q4H PRN Tessa Talley MD        ondansetron [facility]) tablet 4 mg  4 mg Oral TID PRN Tessa Talley MD        Trametinib Dimethyl Sulfoxide TABS 2 mg  2 mg Oral Daily Tessa Talley MD   Stopped at 04/19/21 9823    acetaminophen (TYLENOL) tablet 650 mg  650 mg Oral Q6H PRN Tessa Talley MD        zolpidem [facility]) tablet 5 mg  5 mg Oral Nightly PRN Tessa Talley MD        LORazepam (ATIVAN) injection 0.5 mg  0.5 mg Intravenous Q6H PRN Tessa Talley MD           Allergies:  Patient has no known allergies. REVIEW OF SYSTEMS:      · General: Positive for weakness and fatigue. No unanticipated weight loss or decreased appetite. No fever or chills. · Eyes: No blurred vision, eye pain or double vision. · Ears: No hearing problems or drainage. No tinnitus. · Throat: No sore throat, problems with swallowing or dysphagia. · Respiratory: As above. · Cardiovascular: No chest pain, orthopnea or PND. No lower extremity edema. No palpitation. · Gastrointestinal: No problems with swallowing.  No abdominal pain or bloating. No nausea or vomiting. No diarrhea or constipation. No GI bleeding. · Genitourinary: No dysuria, hematuria, frequency or urgency. · Musculoskeletal: No muscle aches or pains. No limitation of movement. No back pain. No gait disturbance, No joint complaints. · Dermatologic: No skin rashes or pruritus. No skin lesions or discolorations. · Psychiatric: No depression, anxiety, or stress or signs of schizophrenia. No change in mood or affect. · Hematologic: No history of bleeding tendency. No bruises or ecchymosis. No history of clotting problems. · Infectious disease: No fever, chills or frequent infections. · Endocrine: No polydipsia or polyuria. No temperature intolerance. · Neurologic: No headaches or dizziness. No weakness or numbness of the extremities. No changes in balance, coordination,  memory, mentation, behavior. · Allergic/Immunologic: No nasal congestion or hives. No repeated infections. PHYSICAL EXAM:      BP (!) 97/57   Pulse 92   Temp 96.8 °F (36 °C) (Oral)   Resp 16   Ht 5' 9\" (1.753 m)   Wt 132 lb 11.5 oz (60.2 kg)   SpO2 97%   BMI 19.60 kg/m²    Temp (24hrs), Av.1 °F (36.7 °C), Min:96.8 °F (36 °C), Max:99 °F (37.2 °C)      General appearance - not in pain or distress. Patient looks chronically ill and cachectic. Mental status - alert and oriented  Eyes - pupils equal and reactive, extraocular eye movements intact  Ears - bilateral TM's and external ear canals normal  Nose - normal and patent, no erythema, discharge or polyps  Mouth - mucous membranes moist, pharynx normal without lesions  Neck - supple, no significant adenopathy  Lymphatics - no palpable lymphadenopathy, no hepatosplenomegaly  Chest -decreased air entry bilaterally. No wheezing.   Heart - normal rate, regular rhythm, normal S1, S2, no murmurs, rubs, clicks or gallops  Abdomen - soft, nontender, nondistended, no masses or organomegaly  Neurological - alert, oriented, normal speech, no focal findings or movement disorder noted  Musculoskeletal - no joint tenderness, deformity or swelling  Extremities - peripheral pulses normal, no pedal edema, no clubbing or cyanosis  Skin - normal coloration and turgor, no rashes, no suspicious skin lesions noted           DATA:      Labs:       CBC:   Recent Labs     04/19/21  0520 04/20/21  0527   WBC 4.4 4.9   HGB 8.0* 8.5*   HCT 25.1* 26.4*    283     BMP:   Recent Labs     04/20/21  0527 04/20/21  1300    138   K 3.8 4.1   CO2 27 28   BUN 10 9   CREATININE 0.48* 0.57*   LABGLOM >60 >60   GLUCOSE 116* 125*     PT/INR: No results for input(s): PROTIME, INR in the last 72 hours. APTT:No results for input(s): APTT in the last 72 hours. LIVER PROFILE:  Recent Labs     04/18/21  1644   AST 18   ALT 6   LABALBU 2.6*     XR ABDOMEN (KUB) (SINGLE AP VIEW)  Narrative: EXAMINATION:  ONE SUPINE XRAY VIEW(S) OF THE ABDOMEN    4/19/2021 8:50 am    COMPARISON:  None. HISTORY:  ORDERING SYSTEM PROVIDED HISTORY: ileus  TECHNOLOGIST PROVIDED HISTORY:  ileus  Reason for Exam: Ileus  Acuity: Acute  Type of Exam: Ongoing    FINDINGS:  Nonspecific bowel gas pattern minimal distended loops of colon. .  No evidence  for obstruction. Decrease no bowel gas when compared to prior study  Impression: Nonspecific bowel gas pattern. Decrease in bowel gas compared to prior study  XR CHEST PORTABLE  Narrative: EXAMINATION:  ONE XRAY VIEW OF THE CHEST    4/19/2021 8:50 am    COMPARISON:  March 27, 2021, chest exam    HISTORY:  ORDERING SYSTEM PROVIDED HISTORY: pleural effusion  TECHNOLOGIST PROVIDED HISTORY:  pleural effusion  Reason for Exam: Pleural effusion  Acuity: Acute  Type of Exam: Ongoing    FINDINGS:  Stable normal cardiopericardial silhouette    Emphysematous changes of the lungs with interval decrease in still small to  moderate right pleural effusion interval increase in mild patchy right  perihilar/right basilar infiltrate.   No pneumothorax  Impression: Interval decrease in still small to moderate right pleural effusion with  increase in now mild patchy right perihilar/right basilar atelectasis versus  infiltrate    IMPRESSION:    Primary Problem  Stage IV right lung metastatic adenocarcinoma  Malignant pleural effusion  Dehydration  Diarrhea  Electrolytes disturbances  Ileus  RECOMMENDATIONS:  1. Records and labs and images were reviewed and discussed with the patient. 2. Currently patient is clinically much better with no more shaking. He is still quite dehydrated. 3. No clear evidence of infection or sepsis. 4. Patient is currently on treatment with Tafinlar and Mekinist.  He is scheduled for repeat his scan in few days for evaluation of response to treatment. No chemotherapy or immunotherapy will be given to patient during this hospitalization. Further care for his cancer will be as outpatient. 5. Continue current therapy. 6. Patient's questions were answered to the best of his satisfaction and he verbalized full understanding and agreement. Discussed with patient and Nurse. MD Kev Johnson MD  Hematologist/Medical Oncologist    Cell: [phone]      This note is created with the assistance of a speech recognition program.  While intending to generate a document that actually reflects the content of the visit, the document can still have some errors including those of syntax and sound a like substitutions which may escape proof reading. It such instances, actual meaning can be extrapolated by contextual diversion.

## 2021-04-20 NOTE — PROGRESS NOTES
Physician Progress Note      Rivka Block  CSN #:                  [id number]  :                       1932  ADMIT DATE:       2021 4:28 PM  [address] [place] DATE:  RESPONDING  PROVIDER #:        Bob Epperson MD          QUERY TEXT:    Patient admitted with Ileus, diarrhea and dehydration. If possible, please   document in progress notes and discharge summary if you are evaluating and /or   treating any of the following: The medical record reflects the following:  Risk Factors: 80 yr old with Stage 4 Lung Ca  Clinical Indicators: Diarrhea, Ileus, BMI 19.60. Per dietitian  Pt   moderately malnourished AEB mild-moderate loss of subcutaneous fat and muscle   mass with 17% wt loss over the past 4 months. Treatment: I&O, Supplements 2x/day, dietitian evaluation    Thank you, please contact me for any questions. Lamar Desir RN CDI Supervisor   [phone]  Options provided:  -- Protein calorie malnutrition moderate  -- Underweight with BMI 19.60  -- Other - I will add my own diagnosis  -- Disagree - Not applicable / Not valid  -- Disagree - Clinically unable to determine / Unknown  -- Refer to Clinical Documentation Reviewer    PROVIDER RESPONSE TEXT:    This patient has moderate protein calorie malnutrition.     Query created by: Chitra Rivera on 2021 7:18 AM      Electronically signed by:  Bob Epperson MD 2021 12:32 PM

## 2021-04-20 NOTE — PROGRESS NOTES
Progress Note  4/20/2021 9:30 AM  Subjective:   Admit Date: 4/18/2021  PCP: LO Adams CNP  CC: shaking  Interval History: pt has had no new shaking episodes since admission. No pain or SOB. No diarrhea anymore. Ambulating some. Diet: DIET GENERAL;  Dietary Nutrition Supplements: Standard High Calorie Oral Supplement  Medications:   Scheduled Meds:   albuterol  2.5 mg Nebulization TID    cetirizine  10 mg Oral Nightly    atorvastatin  20 mg Oral Nightly    sodium chloride flush  5-40 mL Intravenous 2 times per day    enoxaparin  40 mg Subcutaneous Daily    Dabrafenib Mesylate  75 mg Oral BID    levothyroxine  100 mcg Oral Daily    Trametinib Dimethyl Sulfoxide  2 mg Oral Daily     Continuous Infusions:  CBC:   Recent Labs     04/18/21  1644 04/19/21  0520 04/20/21  0527   WBC 6.3 4.4 4.9   HGB 9.5* 8.0* 8.5*    241 283     BMP:    Recent Labs     04/18/21  1644 04/19/21  0520 04/20/21  0527   * 138 138   K 3.7 3.6* 3.8   CL 99 105 104   CO2 28 27 27   BUN 14 11 10   CREATININE 0.53* 0.49* 0.48*   GLUCOSE 121* 97 116*     Hepatic:   Recent Labs     04/18/21  1644   AST 18   ALT 6   BILITOT 0.22*   ALKPHOS 102     Troponin: No results for input(s): TROPONINI in the last 72 hours. BNP: No results for input(s): BNP in the last 72 hours. Lipids: No results for input(s): CHOL, HDL in the last 72 hours. Invalid input(s): LDLCALCU  INR: No results for input(s): INR in the last 72 hours.     Objective:   Vitals: BP (!) 152/86   Pulse 85   Temp 99 °F (37.2 °C) (Oral)   Resp 16   Ht 5' 9\" (1.753 m)   Wt 132 lb 11.5 oz (60.2 kg)   SpO2 97%   BMI 19.60 kg/m²   General appearance: alert and cooperative with exam  Neck: supple, symmetrical, trachea midline  Lungs: clear to auscultation bilaterally  Heart: regular rate and rhythm, S1, S2 normal, no murmur, click, rub or gallop  Abdomen: soft, non-tender; bowel sounds normal; no masses,  no organomegaly  Extremities: extremities normal, atraumatic, no cyanosis or edema  Neurologic: Mental status: Alert, oriented, thought content appropriate    Assessment and Plan:   1. Hyponatremia- better  2. Hypocalcemia- replace  3. Shaking- no further episodes  4. Hypothyroidism- TSH low so dose adjusted. 5. ?ileus?- repeat xray shows no ileus.      Patient Active Problem List:     Postoperative hypothyroidism     Essential hypertension     Hyperlipidemia     Osteoarthritis of both hips     H/O head and neck radiation     History of prostate cancer     Actinic keratosis     Neoplasm of uncertain behavior of skin     Pleural effusion     History of malignant neoplasm of thyroid     Lung mass     Dyspnea     Primary adenocarcinoma of right lung (HCC)     Hypercalcemia     Osteoarthrosis     Moderate protein-calorie malnutrition (HCC)     General weakness     Sepsis ([address]     Fall     Malignant pleural effusion     Ileus ([address]     Dehydration      Electronically signed by Wilmer Perez MD on 4/20/2021 at 9:30 AM

## 2021-04-20 NOTE — DISCHARGE INSTR - COC
Continuity of Care Form    Patient Name: Casi Shahid   :  1932  MRN:  [id number]    [facility] date:  2021  Discharge date:  ***    Code Status Order: Full Code   Advance Directives:   Advance Care Flowsheet Documentation     Date/Time Healthcare Directive Type of Healthcare Directive Copy in [address] Agent's Name Healthcare Agent's Phone Number    21 1500  Yes, patient has an advance directive for healthcare treatment  Durable power of  for health care;Living will  Yes, copy in chart  Healthcare power of   Cayla Arce  [phone]          Admitting Physician:  Joselo Parson MD  PCP: LO Burch - CNP    Discharging Nurse: [facility] Unit/Room#: 326/326-01  Discharging Unit Phone Number: ***    Emergency Contact:   Extended Emergency Contact Information  Primary Emergency Contact: [address] Phone: [phone]  Mobile Phone: [phone]  Relation: Child    Past Surgical History:  Past Surgical History:   Procedure Laterality Date    CARPAL TUNNEL RELEASE Bilateral     IR INSERT TUNNELED PLEURA CATH W CUFF  10/19/2020    IR INSERT TUNNELED PLEURA CATH W CUFF 10/19/2020 Praveena Toussaint MD [facility]    MOUTH SURGERY      dental repairs    PROSTATECTOMY      THYROIDECTOMY         Immunization History:   Immunization History   Administered Date(s) Administered    COVID-19, [place], PF, 30mcg/0.3mL 2021, 2021    Influenza Virus Vaccine 2020    Influenza, High Dose (Fluzone 65 yrs and older) 2017, 2018, 10/15/2018, 10/01/2019    Influenza, Triv, inactivated, subunit, adjuvanted, IM (Fluad 65 yrs and older) 10/01/2019    Pneumococcal Conjugate 13-valent (Rpvplum20) 2018    Pneumococcal Polysaccharide (Xuuhgtsne29) 10/10/2018    Tdap (Boostrix, Adacel) 2021    Zoster Recombinant (Shingrix) 2019       Active Problems:  Patient Active Problem List {P DME RUNR:713922368}  Feeding  {[facility] DME FMPM:937315431}  Med Admin  {[facility] DME KYGE:874814258}  Med Delivery   { [facility] MED Delivery:933394943}    Wound Care Documentation and Therapy:  Wound 21 Coccyx Medial (Active)   Wound Etiology Pressure Stage  1 21   Dressing Status Clean;Dry; Intact 21   Dressing/Treatment Foam 21   Wound Assessment Pink/red 21   Number of days: 1        Elimination:  Continence:   · Bowel: {YES / Z}  · Bladder: {YES / HU:58261}  Urinary Catheter: {Urinary Catheter:767713954}   Colostomy/Ileostomy/Ileal Conduit: {YES / WE:29856}       Date of Last BM: ***    Intake/Output Summary (Last 24 hours) at 2021 1344  Last data filed at 2021 1708  Gross per 24 hour   Intake 675 ml   Output 150 ml   Net 525 ml     I/O last 3 completed shifts:   In: 56 [P.O.:675]  Out: 300 [Urine:300]    Safety Concerns:     [employer] Safety Concerns:909207306}    Impairments/Disabilities:      [employer] Impairments/Disabilities:300273589}    Nutrition Therapy:  Current Nutrition Therapy:   [employer] Diet List:039965784}    Routes of Feeding: {[facility] DME Other Feedings:840395515}  Liquids: {Slp liquid thickness:85038}  Daily Fluid Restriction: {[facility] DME Yes amt example:713446930}  Last Modified Barium Swallow with Video (Video Swallowing Test): {Done Not Done SUJ}    Treatments at the Time of Hospital Discharge:   Respiratory Treatments: ***  Oxygen Therapy:  {Therapy; copd oxygen:55115}  Ventilator:    { CC Vent IZPA:265258205}    Rehab Therapies: {THERAPEUTIC INTERVENTION:0713983973}  Weight Bearing Status/Restrictions: [employer] Weight Bearin}  Other Medical Equipment (for information only, NOT a DME order):  {EQUIPMENT:136065940}  Other Treatments: ***    Patient's personal belongings (please select all that are sent with patient):  {CHP DME Belongings:775948463}    RN SIGNATURE:  {Esignature:751713966}    CASE MANAGEMENT/SOCIAL WORK SECTION    Inpatient Status Date: ***    Readmission Risk Assessment Score:  Readmission Risk              Risk of Unplanned Readmission:        16           Discharging to Facility/ Agency   · Name:   · Address:  · Phone:  · Fax:    Dialysis Facility (if applicable)   · Name:  · Address:  · Dialysis Schedule:  · Phone:  · Fax:    / signature: {Esignature:979095802}    PHYSICIAN SECTION    Prognosis: {Prognosis:3294099822}    Condition at Discharge: [address] [place] [address] Patient Condition:230984269}    Rehab Potential (if transferring to Rehab): {Prognosis:8297264935}    Recommended Labs or Other Treatments After Discharge: ***    Physician Certification: I certify the above information and transfer of Ruby Ahuja  is necessary for the continuing treatment of the diagnosis listed and that he requires {Admit to Appropriate Level of Care:24631} for {GREATER/LESS:462343335} 30 days.      Update Admission H&P: {CHP DME Changes in KGVNH:716202864}    PHYSICIAN SIGNATURE:  {Esignature:117715156}

## 2021-04-20 NOTE — PROGRESS NOTES
adenocarcinoma of right lung [facility]), Prostate cancer ([facility] Sepsis ([facility] Thyroid cancer ([facility] and Thyroid disease. has a past surgical history that includes Prostatectomy; Thyroidectomy; Carpal tunnel release (Bilateral); Mouth surgery; and IR INSERT TUNNELED PLEURA CATH W CUFF (10/19/2020). Restrictions  Restrictions/Precautions  Restrictions/Precautions: Up as Tolerated, Fall Risk, Contact Precautions  Required Braces or Orthoses?: No  Position Activity Restriction  Other position/activity restrictions: Up with assistance, c-diff rule out     Subjective   General  Patient assessed for rehabilitation services?: Yes  Response to previous treatment: Patient with no complaints from previous session  Family / Caregiver Present: No  Subjective  Subjective: Pt pleasant and engaged throughtout session. General Comment  Comments: RN ok'd for tx this AM.  Vital Signs  Patient Currently in Pain: Denies     Orientation  Orientation  Overall Orientation Status: Within Functional Limits     Objective    ADL  Additional Comments: Upon supine > sit at EOB, pt reports use of bathroom prior to arrival and denies participation in additional ADLs at this time.         Balance  Sitting Balance: Stand by assistance(seated in recliner ~10-12 minutes to perform UE rom/resistive exercises)  Standing Balance: Stand by assistance(close SBA during functional mobility)    Functional Mobility  Functional - Mobility Device: Rolling Walker  Activity: Other(from bed to chair)  Assist Level: Stand by assistance(close SBA - no LOB observed)    Bed mobility  Supine to Sit: Stand by assistance  Sit to Supine: (pt up in recliner with breakfast following session.)     Transfers  Sit to stand: Stand by assistance;Contact guard assistance  Stand to sit: Stand by assistance;Contact guard assistance  Transfer Comments: min VCs for safety awareness/hand placement with use of RW for functional transfers                       Cognition  Overall Cognitive Status: Exceptions  Following Commands: Follows multistep commands with increased time; Follows multistep commands with repitition  Safety Judgement: Decreased awareness of need for assistance  Problem Solving: Assistance required to generate solutions  Insights: Decreased awareness of deficits  Initiation: Requires cues for some                    Type of ROM/Therapeutic Exercise  Type of ROM/Therapeutic Exercise: Resistive Bands;AROM  Comment: Pt seated unsupported in recliner for BUE ROM/resistive exercises to promote activity tolerance and strength required for participation in ADL/IADLs. Exercises  Shoulder Elevation: 1 x 10 reps  Shoulder Flexion: 1 x 10 reps  Shoulder Extension: 1 x 10 reps  Horizontal ABduction: 1 x 10 reps  Horizontal ADduction: 1 x 10 reps  Elbow Flexion: 1 x 10 reps with theraband  Elbow Extension: 1 x 10 reps with theraband  Wrist Flexion: 1 x 10 reps with theraband  Wrist Extension: 1 x 10 reps with theraband  Other: Pt educated on and provided HEP of UE ROM/resistive exercises with yellow theraband - pt demo'd understanding follow visual/verbal  guides. Bilateral AROM for shoulder flexion/extension and horizontal abduction/adduction secondary to R glenohumeral pain d/t pt reported fall ~10 days prior.          Plan   Plan  Times per week: 5-6x/wk  Times per day: Daily  Current Treatment Recommendations: Strengthening, ROM, Safety Education & Training, Patient/Caregiver Education & Training, Self-Care / ADL, Home Management Training, Equipment Evaluation, Education, & procurement, Endurance Training, Functional Mobility Training    Goals  Short term goals  Short term goal 1: Pt will demo Mod IND with least restrictive AD to perform functional mobility/transfers during ADLs with no LOB  Short term goal 2: Pt will demo 10+ minutes of activity tolerance to increase participation in basic ADLs  Short term goal 3: Pt will demo UB/LB ADLs w/SBA, requiring 3 or less verbal/tactile cues with the use of AE/DME PRN  Short term goal 4: Pt will demo BUE HEP for improved strength and activity tolerance during functional task performance  Short term goal 5: Pt will independently demo good safety awareness throughtout engagment in all ADL/functionl tasks  Patient Goals   Patient goals : 14 visits       Therapy Time   Individual Concurrent Group Co-treatment   Time In 0852         Time Out 0916         Minutes 24         Timed Code Treatment Minutes: 18 Minutes       Ayleen Almeida OTR/L

## 2021-05-01 NOTE — PROGRESS NOTES
breath sounds. Decreased breath sounds present. Abdominal:      General: Bowel sounds are normal.      Palpations: Abdomen is soft. Tenderness: There is no abdominal tenderness. Musculoskeletal:      Right lower le+ Pitting Edema present. Left lower le+ Pitting Edema present. Skin:     General: Skin is warm and dry. Neurological:      Mental Status: He is alert and oriented to person, place, and time. Cranial Nerves: No cranial nerve deficit. Psychiatric:         Mood and Affect: Mood normal.         Behavior: Behavior normal.         Thought Content: Thought content normal.          Current Outpatient Medications   Medication Sig Dispense Refill    polyethylene glycol (MIRALAX) 17 GM/SCOOP powder Take 17 g by mouth daily as needed (constipation) 510 g 11    acetaminophen (TYLENOL) 325 MG tablet Take 2 tablets by mouth every 4 hours as needed for Pain or Fever 120 tablet 3    levothyroxine (SYNTHROID) 88 MCG tablet Take 1 tablet by mouth Daily 30 tablet 3    loratadine (CLARITIN) 10 MG tablet TAKE 1 TABLET BY MOUTH DAILY 30 tablet 2    hydrOXYzine (ATARAX) 25 MG tablet Take 1 tablet by mouth every 4 hours as needed for Anxiety (tremors) 60 tablet 1    ondansetron (ZOFRAN) 4 MG tablet Take 1 tablet by mouth 3 times daily as needed for Nausea or Vomiting 90 tablet 0    meloxicam (MOBIC) 15 MG tablet Take 1 tablet by mouth daily (Patient taking differently: Take 15 mg by mouth nightly ) 30 tablet 3    simvastatin (ZOCOR) 40 MG tablet TAKE 1 TABLET BY MOUTH EVERY NIGHT 90 tablet 2    Glucos-Chond-Hyal Ac-Ca Fructo (MOVE FREE JOINT HEALTH ADVANCE PO) Take 1 tablet by mouth daily       No current facility-administered medications for this visit.          No Known Allergies     Past Medical History:   Diagnosis Date    Actinic keratosis 2019    Arthritis     Dyspnea     Essential hypertension 2018    Fall     General weakness 3/27/2021    H/O head and neck radiation 11/13/2018    Hearing loss     History of malignant neoplasm of thyroid 6/20/2017    History of prostate cancer 11/13/2018    Hypercalcemia 1/15/2021    Hyperlipidemia     Hypertension     Lung cancer ([address] 10/2020    stage 4, cells in pleural space. 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 Primary adenocarcinoma of right lung ([address]     2. Localized edema     3. Constipation, unspecified constipation type  polyethylene glycol (MIRALAX) 17 GM/SCOOP powder        Plan:  Resident has decided to stop chemotherapy at this time due to side effects. He is eating small amounts at each meal.   Discussed stopping simvastatin but will continue at this time.    miralax PRN for constipation

## 2021-05-04 NOTE — PATIENT INSTRUCTIONS
1. Cleanse right shoulder wound with wound wash, apply aquacell AG to wound bed and cove with Allyvn or ABD pad daily & PRN    2. Wound cultures taken today    3.  Simvastatin discontinued

## 2021-05-04 NOTE — PROGRESS NOTES
[id number] [facility]  [id number] [address]  Dept: [phone]    Monika Evans is a 80 y.o. male Established patient, who presents today for his medical conditions/complaints as noted below. Chief Complaint   Patient presents with    Wound Check       HPI:     HPI  Patient states that he had a small scratch or something on his back a couple weeks ago and it has gradually morphed into this large wound that is seeping a significant amount of drainage sometimes clear sometimes pustular to the point that even with a dressing over it he saturates the bed sheets at night and they need to be changed each morning due to the drainage from his right shoulder wound. His daughter has been putting a nonadherent dressing with some gauze over the top of it the last few days. It is painful. He has stopped taking chemotherapy drugs for lung cancer due to side effects. Reviewed prior notes None  Reviewed previous      LDL Cholesterol (mg/dL)   Date Value   04/26/2019 58       (goal LDL is <100)   AST (U/L)   Date Value   04/18/2021 18     ALT (U/L)   Date Value   04/18/2021 6     BUN (mg/dL)   Date Value   04/20/2021 9     TSH (mIU/L)   Date Value   04/20/2021 0.18 (L)     BP Readings from Last 3 Encounters:   05/04/21 118/78   05/01/21 (!) 92/50   04/20/21 (!) 97/57          (goal 120/80)    Past Medical History:   Diagnosis Date    Actinic keratosis 5/17/2019    Arthritis     Dyspnea     Essential hypertension 9/28/2018    Fall     General weakness 3/27/2021    H/O head and neck radiation 11/13/2018    Hearing loss     History of malignant neoplasm of thyroid 6/20/2017    History of prostate cancer 11/13/2018    Hypercalcemia 1/15/2021    Hyperlipidemia     Hypertension     Lung cancer ([address] 10/2020    stage 4, cells in pleural space.     Lung mass 9/30/2020    Malignant pleural effusion     RIGHT    Moderate protein-calorie malnutrition ([facility] 3/22/2021    Neoplasm of uncertain behavior of skin 2019    On supplemental oxygen therapy     Osteoarthritis of both hips 2018    Osteoarthrosis 2017    Pleural effusion 2020    Postoperative hypothyroidism 2018    Primary adenocarcinoma of right lung ([facility] 10/15/2020    Prostate cancer ([facility] 1992 approx    treated surgically    Sepsis ([facility] 3/28/2021    Thyroid cancer ([facility] 2017    treated surgically and with radiation    Thyroid disease       Past Surgical History:   Procedure Laterality Date    CARPAL TUNNEL RELEASE Bilateral     IR INSERT TUNNELED PLEURA CATH W CUFF  10/19/2020    IR INSERT TUNNELED PLEURA CATH W CUFF 10/19/2020 Italo Villasenor MD [facility]    MOUTH SURGERY      dental repairs    PROSTATECTOMY      THYROIDECTOMY         Family History   Problem Relation Age of Onset    Prostate Cancer Father     Diabetes Brother        Social History     Tobacco Use    Smoking status: Former Smoker     Packs/day: 1.00     Years: 20.00     Pack years: 20.00     Types: Cigarettes     Quit date:      Years since quittin.3    Smokeless tobacco: Never Used   Substance Use Topics    Alcohol use: Not Currently      Current Outpatient Medications   Medication Sig Dispense Refill    acetaminophen (TYLENOL) 325 MG tablet Take 2 tablets by mouth every 4 hours as needed for Pain or Fever 120 tablet 3    levothyroxine (SYNTHROID) 88 MCG tablet Take 1 tablet by mouth Daily 30 tablet 3    loratadine (CLARITIN) 10 MG tablet TAKE 1 TABLET BY MOUTH DAILY 30 tablet 2    hydrOXYzine (ATARAX) 25 MG tablet Take 1 tablet by mouth every 4 hours as needed for Anxiety (tremors) 60 tablet 1    ondansetron (ZOFRAN) 4 MG tablet Take 1 tablet by mouth 3 times daily as needed for Nausea or Vomiting 90 tablet 0    meloxicam (MOBIC) 15 MG tablet Take 1 tablet by mouth daily (Patient taking differently: Take 15 mg by mouth nightly ) 30 tablet 3    Glucos-Chond-Hyal Ac-Ca Fructo (MOVE FREE JOINT HEALTH ADVANCE PO) Take 1 tablet by mouth daily      polyethylene glycol (MIRALAX) 17 GM/SCOOP powder Take 17 g by mouth daily as needed (constipation) (Patient not taking: Reported on 5/4/2021) 510 g 11     No current facility-administered medications for this visit. Facility-Administered Medications Ordered in Other Visits   Medication Dose Route Frequency Provider Last Rate Last Admin    sodium chloride flush 0.9 % injection 10 mL  10 mL Intravenous PRN Paula Baires MD   10 mL at 05/04/21 1146     No Known Allergies    Health Maintenance   Topic Date Due    Shingles Vaccine (2 of 2) 05/07/2019    Lipid screen  04/26/2020    PSA counseling  10/01/2021    Annual Wellness Visit (AWV)  03/23/2022    TSH testing  04/20/2022    Potassium monitoring  04/20/2022    Creatinine monitoring  04/20/2022    DTaP/Tdap/Td vaccine (2 - Td) 04/14/2031    Flu vaccine  Completed    Pneumococcal 65+ years Vaccine  Completed    COVID-19 Vaccine  Completed    Hepatitis A vaccine  Aged Out    Hepatitis B vaccine  Aged Out    Hib vaccine  Aged Out    Meningococcal (ACWY) vaccine  Aged Out       Subjective:      Review of Systems   Constitutional: Positive for fatigue. Appetite continues to be poor   HENT: Negative for congestion and nosebleeds. Eyes: Negative for pain and redness. Respiratory: Positive for cough and shortness of breath. Cardiovascular: Negative for chest pain, palpitations and leg swelling. Gastrointestinal: Negative for constipation and diarrhea. Genitourinary: Negative for difficulty urinating and dysuria. Musculoskeletal: Positive for gait problem. Skin: Negative for rash and wound. Neurological: Positive for weakness. Negative for dizziness and light-headedness. Psychiatric/Behavioral: Positive for agitation. Negative for sleep disturbance. The patient is not nervous/anxious.          Reports easily agitated Objective:     /78   Pulse 85   Wt 129 lb 8 oz (58.7 kg)   BMI 19.12 kg/m²   Physical Exam  Constitutional:       Appearance: He is cachectic. HENT:      Head: Normocephalic and atraumatic. Cardiovascular:      Rate and Rhythm: Normal rate and regular rhythm. Pulmonary:      Comments: Left lung sounds clear. Right lung little to no air movement auscultated. Port is not draining any fluid at this time. Abdominal:      General: Bowel sounds are normal.      Palpations: Abdomen is soft. Musculoskeletal:      Right lower leg: No edema. Left lower leg: No edema. Skin:     Findings: Erythema present. Comments: Partially open wound right shoulder 12 x 9 by less than 0.1 cm open area needing serous fluid at this time. Erythema. Wound is primarily has thin layer of skin over it but there are several pinpoint open areas that are seeping fluid. No odor. Neurological:      Mental Status: He is alert and oriented to person, place, and time. Gait: Gait abnormal.   Psychiatric:         Mood and Affect: Mood normal.         Behavior: Behavior is cooperative. Thought Content: Thought content normal.         Assessment:       Diagnosis Orders   1. Open wound of right shoulder, initial encounter  Culture, Aerobic and Anaerobic   2. Primary adenocarcinoma of right lung ([address]     3. At high risk for falls          Plan:    1. Cleanse right shoulder wound with wound wash, apply aquacell AG to wound bed and cove with Allyvn or ABD pad daily & PRN    2. Wound cultures taken today    3. Simvastatin discontinued    Return in about 2 weeks (around 5/18/2021) for wound check. Orders Placed This Encounter   Procedures    Culture, Aerobic and Anaerobic     Standing Status:   Future     Standing Expiration Date:   5/4/2022     No orders of the defined types were placed in this encounter. Patient given educational materials - see patient instructions.   Discussed use, benefit, and side effects of prescribed medications. All patient questions answered. Pt voiced understanding. Reviewed health maintenance. Instructed to continue current medications, diet and exercise. Patient agreed with treatment plan. Follow up as directed. Electronically signed by LO Lozano CNP on 5/4/2021 at 7:47 PM    On the basis of positive falls risk screening, assessment and plan is as follows: Encouraged to call for assistance .

## 2021-05-10 NOTE — PATIENT INSTRUCTIONS
Will call IR to make sure removal of tunneled pleural cath is scheduled; printed and mailed AVS for patient      sanya

## 2021-05-11 NOTE — TELEPHONE ENCOUNTER
A call was made to IR to schedule the appointment for removal of patient's pleural tube. Sergio Tierney scheduled [facility] for a 12:15pm arrival time at [address] [facility] main registration on 5/12/21. The patient was called and a detailed voicemail was left on both his land line and cell phone numbers. No Asa or blood thinners should be taken now leading up to procedure. The phone number for IR was given in case of questions the patient or his daughter might have. My name and number were also provided.

## 2021-05-13 NOTE — BRIEF OP NOTE
Brief Postoperative Note    Audie Urban  YOB: 1932  [id number]    Pre-operative Diagnosis: Adenocarcinoma of right lung/Malfunctioning Aspira    Post-operative Diagnosis: Same    Procedure: Aspira remvoal    Anesthesia: Local    Surgeons/Assistants: DINESH Morris    Estimated Blood Loss: less than 50     Complications: None    Specimens: Was Obtained:     Findings: Successful removal of tunneled pleural catheter in its entirety.       Electronically signed by DINESH Abarca on 5/13/2021 at 2:55 PM

## 2021-05-13 NOTE — PROGRESS NOTES
Here to have pleural  aspira drain removal. Thierry Gonzalez here. Right chest is prepped, draped and numbed with lidocaine. Aspira drain out without issues. Dressing on and patient discharged to home with daughter.

## 2021-05-14 NOTE — TELEPHONE ENCOUNTER
Name: Thermon Denver  : 1932  MRN: [id number]    Oncology Navigation Follow-Up Note  Navigator met with pt. Face to face along with daughter Chris Carey. MD reviewing scans with pt. And a 50% reduction in tumors noted. MD discussing Tx options. Pt. Could proceed with oral chemotherapy at half the dose or palliative/hospice care discussed . Pt. chossing to try oral chemotherapy at half the dose. New script placed for Mekinist, now down to 1mg. MD instructing pt. And daughter to cut the 2mg in half for now until refill needed. Daughter verbalizing understanding. Taflinar pt. Will take only one(75mg) tablet daily. Daughter verbalizes understanding. Writer notifying Nany Lord. Of updates . Daughter requesting medication be sent to her address at. .. [facility] Writer left message for [place] as well.    Electronically signed by Tramaine Nicole RN on 2021 at 11:51 AM

## 2021-05-14 NOTE — PROGRESS NOTES
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 Pleural fluid cytology positive for adenocarcinoma  Multiple masses in the right hemithorax which are pleural based and along the pericardium. ECOG performance status 2  TREATMENT HISTORY:  Next-generation testing with TEMPUS is ordered  Patient planning to have Pleurx catheter placement on 10/19  Patient not a candidate for aggressive chemotherapy therefore we will plan single agent Keytruda, regardless of PD-L1 expression results  His insurance delayed the treatment approval  11/13/20: started on single agent Keytruda. Unfortunately his scan in January showed progression of disease. He has BRAF [id number] mutation. Started Tafinlar plus Mekinist on 3/3/21  Chest abdomen pelvis on 5/4/2021 showed interval improvement in the CT findings  INTERVAL HISTORY[de-identified]   Patient is returning for follow-up visit and to discuss further recommendations. Is here to discuss results of recent restaging scans. The scan showed interval improvement and is now pleural neoplastic disease and decrease in the soft tissue nodularity. Clinically he does have significant fatigue. He does want to focus on comfort. Currently he is receiving Tafinlar plus Mekinist.  He denies any nausea vomiting, diarrhea. He reported that his fatigue in fact is better. Because of recent falls and fatigue he was admitted to hospital on 4/18 again. During this visit patient's allergy, social, medical, surgical history and medications were reviewed and updated. HPI in Brief:   The patient is a 80 y.o.   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     [facility] oncology was consulted for suspicion of metastatic disease. Unknown primary.     Patient also reported poor appetite and intermittent episodes of dry cough since 1 month. No significant weight loss reported. Significant risk factors include 13-pack-year history of smoking quit in 1969  Daily drinker drinking 2-3 drinks every day. Denied exposure to chemicals like asbestos/silica  Evaluated by pulmonology. Had IR guided thoracentesis. Surg path report showed adenocarcinoma of lung. Review of systems  General: no fever or night sweats, Weight is stable. ENT: No double or blurred vision, no tinnitus or hearing problem, no dysphagia or sore throat   Respiratory: No chest pain, no shortness of breath, no cough or hemoptysis. Cardiovascular: Denies chest pain, PND or orthopnea. No L E swelling or palpitations. Gastrointestinal:    No nausea or vomiting, abdominal pain, diarrhea or constipation. Genitourinary: Denies dysuria, hematuria, frequency, urgency or incontinence. Neurological: Denies headaches, decreased LOC, no sensory or motor focal deficits.      No Known Allergies    Medications:   Reviewed in Epic   Current Outpatient Medications   Medication Sig Dispense Refill    sulfamethoxazole-trimethoprim (BACTRIM DS;SEPTRA DS) 800-160 MG per tablet Take 1 tablet by mouth 2 times daily for 14 days 28 tablet 0    polyethylene glycol (MIRALAX) 17 GM/SCOOP powder Take 17 g by mouth daily as needed (constipation) 510 g 11    acetaminophen (TYLENOL) 325 MG tablet Take 2 tablets by mouth every 4 hours as needed for Pain or Fever 120 tablet 3    levothyroxine (SYNTHROID) 88 MCG tablet Take 1 tablet by mouth Daily 30 tablet 3    loratadine (CLARITIN) 10 MG tablet TAKE 1 TABLET BY MOUTH DAILY 30 tablet 2    hydrOXYzine (ATARAX) 25 MG tablet Take 1 tablet by mouth every 4 hours as needed for Anxiety (tremors) 60 tablet 1    ondansetron (ZOFRAN) 4 MG tablet Take 1 tablet by mouth 3 times daily as needed for Nausea or Vomiting 90 tablet 0    meloxicam (MOBIC) 15 MG tablet Take 1 tablet by mouth daily 30 tablet 3    Glucos-Chond-Hyal Ac-Ca Fructo (MOVE FREE [facility] HEALTH ADVANCE PO) Take 1 tablet by mouth daily       No current facility-administered medications for this visit. Objective:   Vitals: BP (!) 106/50   Pulse 83   Temp 97.5 °F (36.4 °C) (Oral)   Resp 16   Wt 128 lb 3.2 oz (58.2 kg)   BMI 18.93 kg/m²   General appearance - well appearing, on 2 L nasal cannula. Mental status - alert and cooperative   Mouth - mucous membranes moist  Neck -Nno significant adenopathy, s/p resection of thyroid  Lymphatics - no palpable lymphadenopathy. Chest -bilaterally diminished breath sounds at bases. Bilateral expiratory wheezes noticed.   Heart - normal rate, regular rhythm, normal S1, S2, no murmurs  Abdomen - soft, nontender, nondistended, no masses or organomegaly   Neurological - alert, oriented, normal speech, no focal findings   Musculoskeletal - no joint tenderness, deformity or swelling   Extremities - peripheral pulses normal, no pedal edema, no clubbing or cyanosis   Skin - normal coloration and turgor, no rashes, no suspicious skin lesions noted ,     Data:  Lab Results   Component Value Date    WBC 4.5 05/07/2021    HGB 8.2 (A) 05/07/2021    HCT 25.4 (A) 05/07/2021    MCV 90.5 05/07/2021     05/07/2021     Lab Results   Component Value Date     05/11/2021    K 4.2 05/11/2021     05/11/2021    CO2 27 05/11/2021    BUN 11 05/11/2021    CREATININE 0.7 05/11/2021    GLUCOSE 68 05/07/2021    CALCIUM 7.9 05/11/2021    PROT 5.1 (L) 04/18/2021    LABALBU 2.4 05/07/2021    BILITOT 0.4 05/07/2021    ALKPHOS 64 05/07/2021    AST 11 05/07/2021    ALT 4 05/07/2021    LABGLOM 129 05/11/2021    GFRAA >60 04/20/2021    GLOB NOT REPORTED 04/18/2021       Surgical Pathology Report   Surgical Pathology   Collected:  10/01/20 1600    Lab status:  Final    Resulting lab:  [facility]    Value:  -- Diagnosis --   RIGHT THORACENTESIS FLUID:          METASTATIC ADENOCARCINOMA, COMPATIBLE WITH LUNG PRIMARY. IMAGING DATA:  CT chest PE   No central or segmental pulmonary embolus.         Large right pleural effusion.         Multiple masses within the right hemithorax which appear to be pleural based    and along the pericardium.  Metastatic disease is suspected      Chest x-ray 9/29/2020  Interval increase in now moderate right basilar opacity with opacification of    the inferior 1/2 of the right hemithorax, findings likely related to    increasing moderately large right pleural effusion with underlying    atelectasis/infiltrate and or mass       CT abd pelvis- 10/2/20  Impression    Scattered right pleural soft tissue nodules and mediastinal lymphadenopathy    reflecting malignancy.         Otherwise, no evidence for metastatic disease in the abdomen or pelvis. MR brain  Impression    Unremarkable MRI of brain without abnormal postcontrast enhancement.     1/21/21 CT chest abd pelvis  Impression   Significant increase in the pleural and pericardial studding in the right   hemithorax with large confluent soft tissue masses with only small areas of   pleural sparing along the upper anterolateral pleural margin.       Loculated moderate right pleural effusion that remains simple fluid in   attenuation with a basilar pleural drain in place.       Linear opacities extending from the right suprahilar region to the right lung   apex with surrounding mild ground-glass and interstitial prominence are not   significantly changed relative to prior, but there is slight increase in resection   5. Thyroid cancer 4 years ago status post resection and radiotherapy  Plan   I reviewed the recent lab work, imaging studies, diagnosis, goals of care and also discussed treatment options with patient and family   Results of recent CT scan which showed interval improvement in the right pleural neoplastic disease with measurable disease in size of soft tissue nodularity  Currently he is on Tafinlar 75 mg twice daily instead of 150 mg twice daily. Given patient's and family's preference I discussed option of decreasing the dose of Tafinlar to 75 mg once daily and also cutting down the dose of Mekinist  Patient wants to try reduced dose and if still having a lot of fatigue and generalized weakness and then he will stop the treatment  And to clinic in 6 weeks with CBC and CMP prior    Kev Donnelly MD  Hematologist/Medical Oncologist    This note is created with the assistance of a speech recognition program.  While intending to generate a document that actually reflects the content of the visit, the document can still have some errors including those of syntax and sound a like substitutions which may escape proof reading. It such instances, actual meaning can be extrapolated by contextual diversion.

## 2021-05-14 NOTE — TELEPHONE ENCOUNTER
Per Dr. Yosef Walker, dose changing for Mekinist. RX escribed for 1 mg daily. Will notify [facility] and Fina Ruiz.

## 2021-05-19 NOTE — PROGRESS NOTES
[address] [address] PRIMARY CARE  [id number] [address] [address]  Dept: [phone]    Huy Crow is a 80 y.o. male Established patient, who presents today for his medical conditions/complaints as noted below. Chief Complaint   Patient presents with    Wound Check    Medication Check       HPI:     HPI  He is drinking water more often in small amounts. He has stopped chemo treatment  His appetite is a little better. No longer has dressing on back  Regular loose bowel movement. Reviewed prior notes None  Reviewed previous Labs 5/14/21    LDL Cholesterol (mg/dL)   Date Value   04/26/2019 58       (goal LDL is <100)   AST (U/L)   Date Value   05/07/2021 11     ALT (U/L)   Date Value   05/07/2021 4     BUN (mg/dL)   Date Value   05/11/2021 11     TSH (mIU/L)   Date Value   04/20/2021 0.18 (L)     BP Readings from Last 3 Encounters:   05/19/21 118/64   05/14/21 (!) 106/50   05/04/21 118/78          (goal 120/80)    Past Medical History:   Diagnosis Date    Actinic keratosis 5/17/2019    Arthritis     Dyspnea     Essential hypertension 9/28/2018    Fall     General weakness 3/27/2021    H/O head and neck radiation 11/13/2018    Hearing loss     History of malignant neoplasm of thyroid 6/20/2017    History of prostate cancer 11/13/2018    Hypercalcemia 1/15/2021    Hyperlipidemia     Hypertension     Lung cancer ([address] 10/2020    stage 4, cells in pleural space.     Lung mass 9/30/2020    Malignant pleural effusion     RIGHT    Moderate protein-calorie malnutrition ([address] 3/22/2021    Neoplasm of uncertain behavior of skin 5/17/2019    On supplemental oxygen therapy     Osteoarthritis of both hips 11/13/2018    Osteoarthrosis 6/20/2017    Pleural effusion 9/30/2020    Postoperative hypothyroidism 11/13/2018    Primary adenocarcinoma of right lung ([address] 10/15/2020    Prostate cancer ([address] 1992 approx    treated surgically    Sepsis ([address] 3/28/2021  Thyroid cancer ([facility] 2017    treated surgically and with radiation    Thyroid disease       Past Surgical History:   Procedure Laterality Date    CARPAL TUNNEL RELEASE Bilateral     IR INSERT TUNNELED PLEURA CATH W CUFF  10/19/2020    IR INSERT TUNNELED PLEURA CATH W CUFF 10/19/2020 Ben Glover MD [facility]    IR REMOVAL OF TUNNELED PLEURAL CATH W CUFF  2021    IR REMOVAL OF TUNNELED PLEURAL CATH W CUFF 2021 [facility]    MOUTH SURGERY      dental repairs    PROSTATECTOMY      THYROIDECTOMY         Family History   Problem Relation Age of Onset    Prostate Cancer Father     Diabetes Brother        Social History     Tobacco Use    Smoking status: Former Smoker     Packs/day: 1.00     Years: 20.00     Pack years: 20.00     Types: Cigarettes     Quit date:      Years since quittin.4    Smokeless tobacco: Never Used   Substance Use Topics    Alcohol use: Not Currently      Current Outpatient Medications   Medication Sig Dispense Refill    Trametinib Dimethyl Sulfoxide (MEKINIST) 0.5 MG TABS Take 1 mg by mouth daily 60 tablet 1    Dabrafenib Mesylate 75 MG CAPS Take 75 mg by mouth 2 times daily 60 capsule 2    sulfamethoxazole-trimethoprim (BACTRIM DS;SEPTRA DS) 800-160 MG per tablet Take 1 tablet by mouth 2 times daily for 14 days 28 tablet 0    polyethylene glycol (MIRALAX) 17 GM/SCOOP powder Take 17 g by mouth daily as needed (constipation) 510 g 11    acetaminophen (TYLENOL) 325 MG tablet Take 2 tablets by mouth every 4 hours as needed for Pain or Fever 120 tablet 3    levothyroxine (SYNTHROID) 88 MCG tablet Take 1 tablet by mouth Daily 30 tablet 3    loratadine (CLARITIN) 10 MG tablet TAKE 1 TABLET BY MOUTH DAILY 30 tablet 2    hydrOXYzine (ATARAX) 25 MG tablet Take 1 tablet by mouth every 4 hours as needed for Anxiety (tremors) 60 tablet 1    ondansetron (ZOFRAN) 4 MG tablet Take 1 tablet by mouth 3 times daily as needed for Nausea or Vomiting 90 tablet 0    meloxicam (MOBIC) 15 MG tablet Take 1 tablet by mouth daily 30 tablet 3    Glucos-Chond-Hyal Ac-Ca Fructo (MOVE FREE [facility] ADVANCE PO) Take 1 tablet by mouth daily       No current facility-administered medications for this visit. No Known Allergies    Health Maintenance   Topic Date Due    Shingles Vaccine (2 of 2) 05/07/2019    PSA counseling  10/01/2021    Annual Wellness Visit (AWV)  03/23/2022    TSH testing  04/20/2022    Potassium monitoring  05/11/2022    Creatinine monitoring  05/11/2022    DTaP/Tdap/Td vaccine (2 - Td) 04/14/2031    Flu vaccine  Completed    Pneumococcal 65+ years Vaccine  Completed    COVID-19 Vaccine  Completed    Hepatitis A vaccine  Aged Out    Hepatitis B vaccine  Aged Out    Hib vaccine  Aged Out    Meningococcal (ACWY) vaccine  Aged Out       Subjective:      Review of Systems   Constitutional: Positive for appetite change and fatigue. Negative for chills and fever. Eyes: Negative for pain and redness. Respiratory: Negative for cough and shortness of breath. Cardiovascular: Negative for chest pain, palpitations and leg swelling. Gastrointestinal: Positive for constipation and nausea. Negative for diarrhea. Genitourinary: Negative for difficulty urinating and dysuria. Musculoskeletal: Positive for gait problem. Skin: Positive for wound. Neurological: Positive for light-headedness. Negative for dizziness and headaches. Psychiatric/Behavioral: Negative for dysphoric mood and sleep disturbance. The patient is nervous/anxious. Objective:     /64   Wt 124 lb 6.4 oz (56.4 kg)   BMI 18.37 kg/m²   Physical Exam  Vitals and nursing note reviewed. Constitutional:       Appearance: He is underweight. HENT:      Head: Normocephalic and atraumatic. Cardiovascular:      Rate and Rhythm: Normal rate and regular rhythm. Heart sounds: Normal heart sounds.    Pulmonary:      Effort: Pulmonary effort is normal.      Breath sounds: Normal breath sounds. Abdominal:      General: Bowel sounds are normal.      Palpations: Abdomen is soft. Musculoskeletal:      Cervical back: Normal range of motion and neck supple. Right lower leg: Edema present. Left lower leg: Edema present. Skin:     General: Skin is warm and dry. Comments: Right shoulder mild eythema. No open areas. Much improved. Neurological:      Mental Status: He is alert and oriented to person, place, and time. Motor: Weakness present. Gait: Gait abnormal (using cane). Psychiatric:         Mood and Affect: Mood normal.         Thought Content: Thought content normal.         Judgment: Judgment normal.         Assessment/Plan:   1. Open wound of right shoulder, initial encounter  2. Non-small cell carcinoma of right lung, stage 4 (HCC)  -     Trametinib Dimethyl Sulfoxide (MEKINIST) 0.5 MG TABS; Take 1 mg by mouth daily, Disp-60 tablet, R-1Dose change. NO PRINT  -     Dabrafenib Mesylate 75 MG CAPS; Take 75 mg by mouth 2 times daily, Disp-60 capsule, R-23/26/21- patient needs rx filled ASAPNO PRINT   Entered Chemo therapy to update records. Return in about 6 weeks (around 6/29/2021) for med check. No orders of the defined types were placed in this encounter. Orders Placed This Encounter   Medications    Trametinib Dimethyl Sulfoxide (MEKINIST) 0.5 MG TABS     Sig: Take 1 mg by mouth daily     Dispense:  60 tablet     Refill:  1     Dose change.  Dabrafenib Mesylate 75 MG CAPS     Sig: Take 75 mg by mouth 2 times daily     Dispense:  60 capsule     Refill:  2     3/26/21- patient needs rx filled ASAP       Patient given educational materials - see patient instructions. Discussed use, benefit, and side effects of prescribed medications. All patient questions answered. Pt voiced understanding. Reviewed health maintenance. Instructed to continue current medications, diet and exercise. Patient agreed with treatment plan. Follow up as directed.      Electronically signed by LO Devine CNP on 5/19/2021 at 11:17 AM

## 2021-05-19 NOTE — PATIENT INSTRUCTIONS
Wound much improved. Complete Bactrim DS as ordered. Patient has elected to restart chemotherapy meds at 1/2 strength.      Labs due in June

## 2021-06-24 NOTE — TELEPHONE ENCOUNTER
AVS from 6/24/21     RTC in 8 weeks with Ct scan and lab prior    *rv is sched for [facility] w/cbc,cmp  *pt w/self sched CT 1 week prior to rv      PT was given orders, scheduling instructions, AVS and an appt schedule

## 2021-06-24 NOTE — PROGRESS NOTES
Date:                           6/24/2021  Patient name:           Sean Brown  MRN:   [id number]  YOB: 1932  PCP:                           Andrei Hansen, APRN - CNP      DIAGNOSIS:  Stage IV metastatic right-sided non-small cell carcinoma of the lung, adenocarcinoma histology. Pleural fluid cytology positive for adenocarcinoma  Multiple masses in the right hemithorax which are pleural based and along the pericardium. ECOG performance status 2  TREATMENT HISTORY:  Next-generation testing with TEMPUS is ordered  Patient planning to have Pleurx catheter placement on 10/19  Patient not a candidate for aggressive chemotherapy therefore we will plan single agent Keytruda, regardless of PD-L1 expression results  His insurance delayed the treatment approval  11/13/20: started on single agent Keytruda. Unfortunately his scan in January showed progression of disease. He has BRAF V6 [id number] mutation. Started Tafinlar plus Mekinist on 3/3/21  Chest abdomen pelvis on 5/4/2021 showed interval improvement in the CT findings  He has fatigue, and required hospitalizations with fall, so he stopped treatment  Now restarted with low dose Taflinar 75 PO daily and Mekinist 1mg  INTERVAL HISTORY[de-identified]   Patient is returning for follow-up visit and to discuss further recommendations. He is tolerating reduced dose taflinar plus Mekinist well. He denies any nausea vomiting, diarrhea. He reported that his fatigue in fact is better. During this visit patient's allergy, social, medical, surgical history and medications were reviewed and updated. HPI in Brief:   The patient is a 80 y.o.   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     [facility] was consulted for suspicion of metastatic disease. Unknown primary.     Patient also reported poor appetite and intermittent episodes of dry cough since 1 month. No significant weight loss reported. Significant risk factors include 13-pack-year history of smoking quit in 1969  Daily drinker drinking 2-3 drinks every day. Denied exposure to chemicals like asbestos/silica  Evaluated by pulmonology. Had IR guided thoracentesis. Surg path report showed adenocarcinoma of lung. Review of systems  General: no fever or night sweats, Weight is stable. ENT: No double or blurred vision, no tinnitus or hearing problem, no dysphagia or sore throat   Respiratory: No chest pain, no shortness of breath, no cough or hemoptysis. Cardiovascular: Denies chest pain, PND or orthopnea. No L E swelling or palpitations. Gastrointestinal:    No nausea or vomiting, abdominal pain, diarrhea or constipation. Genitourinary: Denies dysuria, hematuria, frequency, urgency or incontinence. Neurological: Denies headaches, decreased LOC, no sensory or motor focal deficits.      No Known Allergies    Medications:   Reviewed in Epic   Current Outpatient Medications   Medication Sig Dispense Refill    meloxicam (MOBIC) 15 MG tablet TAKE 1 TABLET BY MOUTH DAILY 30 tablet 3    Dabrafenib Mesylate 75 MG CAPS Take 75 mg by mouth 2 times daily 60 capsule 2    Trametinib Dimethyl Sulfoxide (MEKINIST) 0.5 MG TABS Take 1 mg by mouth daily 60 tablet 1    polyethylene glycol (MIRALAX) 17 GM/SCOOP powder Take 17 g by mouth daily as needed (constipation) 510 g 11    acetaminophen (TYLENOL) 325 MG tablet Take 2 tablets by mouth every 4 hours as needed for Pain or Fever 120 tablet 3    levothyroxine (SYNTHROID) 88 MCG tablet Take 1 tablet by mouth Daily 30 tablet 3    loratadine (CLARITIN) 10 MG tablet TAKE 1 TABLET BY MOUTH DAILY 30 tablet 2    hydrOXYzine (ATARAX) 25 MG tablet Take 1 tablet by mouth every 4 hours as needed for Anxiety (tremors) 60 tablet 1    ondansetron (ZOFRAN) 4 MG tablet Take 1 tablet by mouth 3 times daily as needed for Nausea or Vomiting 90 tablet 0    Glucos-Chond-Hyal Ac-Ca Fructo (MOVE FREE [facility] ADVANCE PO) Take 1 tablet by mouth daily       No current facility-administered medications for this visit. Objective:   Vitals: /77   Pulse 86   Temp 97.6 °F (36.4 °C) (Oral)   Resp 16   Wt 128 lb 14.4 oz (58.5 kg)   BMI 19.04 kg/m²   General appearance - well appearing, on 2 L nasal cannula. Mental status - alert and cooperative   Mouth - mucous membranes moist  Neck -Nno significant adenopathy, s/p resection of thyroid  Lymphatics - no palpable lymphadenopathy. Chest -bilaterally diminished breath sounds at bases. Bilateral expiratory wheezes noticed.   Heart - normal rate, regular rhythm, normal S1, S2, no murmurs  Abdomen - soft, nontender, nondistended, no masses or organomegaly   Neurological - alert, oriented, normal speech, no focal findings   Musculoskeletal - no joint tenderness, deformity or swelling   Extremities - peripheral pulses normal, no pedal edema, no clubbing or cyanosis   Skin - normal coloration and turgor, no rashes, no suspicious skin lesions noted ,     Data:  Lab Results   Component Value Date    WBC 3.2 (L) 06/24/2021    HGB 9.6 (L) 06/24/2021    HCT 30.2 (L) 06/24/2021    MCV 89.3 06/24/2021     06/24/2021     Lab Results   Component Value Date     05/11/2021    K 4.2 05/11/2021     05/11/2021    CO2 27 05/11/2021    BUN 11 05/11/2021    CREATININE 0.7 05/11/2021    GLUCOSE 68 05/07/2021    CALCIUM 7.9 05/11/2021    PROT 5.1 (L) 04/18/2021    LABALBU 2.4 05/07/2021    BILITOT 0.4 05/07/2021    ALKPHOS 64 05/07/2021    AST 11 05/07/2021    ALT 4 05/07/2021    LABGLOM 129 05/11/2021    GFRAA >60 04/20/2021    GLOB NOT REPORTED 04/18/2021       Surgical Pathology Report   Surgical Pathology   Collected:  10/01/20 1600    Lab status:  Final    Resulting lab:  [facility]    Value:  -- Diagnosis --   RIGHT THORACENTESIS FLUID:          METASTATIC ADENOCARCINOMA, COMPATIBLE WITH LUNG PRIMARY. IMAGING DATA:  CT chest PE   No central or segmental pulmonary embolus.         Large right pleural effusion.         Multiple masses within the right hemithorax which appear to be pleural based    and along the pericardium.  Metastatic disease is suspected      Chest x-ray 9/29/2020  Interval increase in now moderate right basilar opacity with opacification of    the inferior 1/2 of the right hemithorax, findings likely related to    increasing moderately large right pleural effusion with underlying    atelectasis/infiltrate and or mass       CT abd pelvis- 10/2/20  Impression    Scattered right pleural soft tissue nodules and mediastinal lymphadenopathy    reflecting malignancy.         Otherwise, no evidence for metastatic disease in the abdomen or pelvis. MR brain  Impression    Unremarkable MRI of brain without abnormal postcontrast enhancement. 1/21/21 CT chest abd pelvis  Impression   Significant increase in the pleural and pericardial studding in the right   hemithorax with large confluent soft tissue masses with only small areas of   pleural sparing along the upper anterolateral pleural margin.       Loculated moderate right pleural effusion that remains simple fluid in   attenuation with a basilar pleural drain in place.       Linear opacities extending from the right suprahilar region to the right lung   apex with surrounding mild ground-glass and interstitial prominence are not   significantly changed relative to prior, but there is slight increase in the   soft tissue fullness about the right hilum.       No findings of metastatic disease below the diaphragm.      PMH:  Past Medical History:   Diagnosis Date    Actinic keratosis 5/17/2019    Arthritis     Dyspnea     Essential hypertension 9/28/2018    Fall     General weakness 3/27/2021    H/O head and neck radiation 11/13/2018    Hearing loss     History of malignant neoplasm of thyroid 6/20/2017    History of prostate cancer 11/13/2018    Hypercalcemia 1/15/2021    Hyperlipidemia     Hypertension     Lung cancer ([address] 10/2020    stage 4, cells in pleural space.  Lung mass 9/30/2020    Malignant pleural effusion     RIGHT    Moderate protein-calorie malnutrition ([address] 3/22/2021    Neoplasm of uncertain behavior of skin 5/17/2019    On supplemental oxygen therapy     Osteoarthritis of both hips 11/13/2018    Osteoarthrosis 6/20/2017    Pleural effusion 9/30/2020    Postoperative hypothyroidism 11/13/2018    Primary adenocarcinoma of right lung ([address] 10/15/2020    Prostate cancer ([address] 1992 approx    treated surgically    Sepsis ([address] 3/28/2021    Thyroid cancer ([address] 2017    treated surgically and with radiation    Thyroid disease       Allergies: No Known Allergies     Assessment    1. Non-small cell cancer, right-sided: Large right-sided pleural effusion with multiple pleural-based masses present suspicion for metastatic disease, cytology from thoracentesis showed adenocarcinoma. His cancer negative for EGFR, ALK, ROS1, RET mutations. ,  But positive for BRAF V6 100 E mutation. PD-L1 expression positive with TPS > 1%. CT abdomen and MRI brain negative for metastasis. Currently he ws started on single agent Keytruda and completed 3 cycles so far. Restaging scans showed progression. Now started on Tafinlar plus Mekinist dose reduced due to side effects  2. Pericardial lymphadenopathy  3. Large right-sided pleural effusion: Status post Pleurx catheter placement  4. Prostrate cancer 28 years ago status post resection   5.   Thyroid cancer 4 years ago status post resection and radiotherapy  Plan   I reviewed the recent lab work, imaging studies, diagnosis, goals of care and also discussed treatment options with patient and family   He is feeling better  Currently he is on Tafinlar 75 mg once daily and Mekinist 1 mg  And to clinic in 8 weeks with CBC and CMP and restagng scans  prior    Kev Villasenor MD  Hematologist/Medical Oncologist    This note is created with the assistance of a speech recognition program.  While intending to generate a document that actually reflects the content of the visit, the document can still have some errors including those of syntax and sound a like substitutions which may escape proof reading. It such instances, actual meaning can be extrapolated by contextual diversion.

## 2021-06-29 NOTE — PROGRESS NOTES
 Osteoarthrosis 2017    Pleural effusion 2020    Postoperative hypothyroidism 2018    Primary adenocarcinoma of right lung ([facility] 10/15/2020    Prostate cancer ([facility] 1992 approx    treated surgically    Sepsis ([facility] 3/28/2021    Thyroid cancer ([facility] 2017    treated surgically and with radiation    Thyroid disease       Past Surgical History:   Procedure Laterality Date    CARPAL TUNNEL RELEASE Bilateral     IR INSERT TUNNELED PLEURA CATH W CUFF  10/19/2020    IR INSERT TUNNELED PLEURA CATH W CUFF 10/19/2020 Luther Hicks MD [facility] SPECIAL PROCEDURES    IR REMOVAL OF TUNNELED PLEURAL CATH W CUFF  2021    IR REMOVAL OF TUNNELED PLEURAL CATH W CUFF 2021 Cy Prost Redjosh, PA AUD[facility] SPECIAL PROCEDURES    MOUTH SURGERY      dental repairs    PROSTATECTOMY      THYROIDECTOMY         Family History   Problem Relation Age of Onset    Prostate Cancer Father     Diabetes Brother        Social History     Tobacco Use    Smoking status: Former Smoker     Packs/day: 1.00     Years: 20.00     Pack years: 20.00     Types: Cigarettes     Quit date:      Years since quittin.5    Smokeless tobacco: Never Used   Substance Use Topics    Alcohol use: Not Currently      Current Outpatient Medications   Medication Sig Dispense Refill    phenylephrine-APAP-guaiFENesin (MUCINEX FAST-MAX) -400 MG/20ML LIQD Take 20 mLs by mouth nightly 1 mL 0    cephALEXin (KEFLEX) 500 MG capsule Take 1 capsule by mouth 2 times daily for 7 days 14 capsule 0    meloxicam (MOBIC) 15 MG tablet TAKE 1 TABLET BY MOUTH DAILY 30 tablet 3    Dabrafenib Mesylate 75 MG CAPS Take 75 mg by mouth 2 times daily 60 capsule 2    Trametinib Dimethyl Sulfoxide (MEKINIST) 0.5 MG TABS Take 1 mg by mouth daily 60 tablet 1    polyethylene glycol (MIRALAX) 17 GM/SCOOP powder Take 17 g by mouth daily as needed (constipation) 510 g 11    acetaminophen (TYLENOL) 325 MG tablet Take 2 tablets by mouth every 4 hours as needed for Pain or Fever 120 tablet 3    levothyroxine (SYNTHROID) 88 MCG tablet Take 1 tablet by mouth Daily 30 tablet 3    loratadine (CLARITIN) 10 MG tablet TAKE 1 TABLET BY MOUTH DAILY 30 tablet 2    hydrOXYzine (ATARAX) 25 MG tablet Take 1 tablet by mouth every 4 hours as needed for Anxiety (tremors) 60 tablet 1    ondansetron (ZOFRAN) 4 MG tablet Take 1 tablet by mouth 3 times daily as needed for Nausea or Vomiting 90 tablet 0    Glucos-Chond-Hyal Ac-Ca Fructo (MOVE FREE JOINT HEALTH ADVANCE PO) Take 1 tablet by mouth daily       No current facility-administered medications for this visit. No Known Allergies    Health Maintenance   Topic Date Due    Shingles Vaccine (2 of 2) 05/07/2019    PSA counseling  10/01/2021    Annual Wellness Visit (AWV)  03/23/2022    TSH testing  04/20/2022    Potassium monitoring  06/24/2022    Creatinine monitoring  06/24/2022    DTaP/Tdap/Td vaccine (2 - Td or Tdap) 04/14/2031    Flu vaccine  Completed    Pneumococcal 65+ years Vaccine  Completed    COVID-19 Vaccine  Completed    Hepatitis A vaccine  Aged Out    Hepatitis B vaccine  Aged Out    Hib vaccine  Aged Out    Meningococcal (ACWY) vaccine  Aged Out       Subjective:      Review of Systems   Constitutional: Positive for fatigue. Negative for chills and fever. HENT: Negative for congestion, nosebleeds and sore throat. Respiratory: Negative for cough and shortness of breath. Cardiovascular: Negative for chest pain, palpitations and leg swelling. Gastrointestinal: Negative for constipation and diarrhea. Genitourinary: Negative for difficulty urinating and dysuria. Musculoskeletal: Negative for arthralgias and back pain. Skin: Negative for rash and wound. dry   Neurological: Negative for light-headedness. Psychiatric/Behavioral: Positive for sleep disturbance. Negative for dysphoric mood. The patient is nervous/anxious.         Objective:     BP (!) 106/50   Pulse 91   Ht 5' 9\" (1.753 m)   Wt 129 lb 12.8 oz (58.9 kg)   SpO2 98%   BMI 19.17 kg/m²   Physical Exam  Vitals and nursing note reviewed. Constitutional:       Appearance: He is underweight. Cardiovascular:      Rate and Rhythm: Normal rate and regular rhythm. Heart sounds: Normal heart sounds. No murmur heard. Comments: Bilateral lower extremity edema +1-2  No carotid bruit  Pulmonary:      Effort: Pulmonary effort is normal.      Breath sounds: Examination of the right-lower field reveals decreased breath sounds. Examination of the left-lower field reveals decreased breath sounds. Decreased breath sounds present. Abdominal:      General: Bowel sounds are normal.      Palpations: Abdomen is soft. Musculoskeletal:      Cervical back: Normal range of motion and neck supple. Right lower leg: Edema present. Left lower leg: Edema present. Skin:     General: Skin is warm and dry. Neurological:      Mental Status: He is alert and oriented to person, place, and time. Cranial Nerves: No cranial nerve deficit. Motor: Weakness present. Psychiatric:         Mood and Affect: Mood normal.         Thought Content: Thought content normal.         Assessment/Plan:   1. Essential hypertension  2. Localized edema  3. Cough       Furosemide 20 mg daily X 3 days  Continue monthly CBC & BMP    Return in about 3 months (around 9/29/2021) for HTN, edema. No orders of the defined types were placed in this encounter. Orders Placed This Encounter   Medications    phenylephrine-APAP-guaiFENesin ([place] FAST-MAX) -400 MG/20ML LIQD     Sig: Take 20 mLs by mouth nightly     Dispense:  1 mL     Refill:  0       Patient given educational materials - see patient instructions. Discussed use, benefit, and side effects of prescribed medications. All patient questions answered. Pt voiced understanding. Reviewed health maintenance. Instructed to continue current medications, diet and exercise.   Patient agreed with treatment plan. Follow up as directed.      Electronically signed by LO Schwartz CNP on 6/29/2021 at 3:06 PM

## 2021-07-02 NOTE — TELEPHONE ENCOUNTER
Pt's daughter Blaise Ribeiro calling. Asking if you would send a request to the [facility] for a fitness to drive order? Pt's brother came into town and they let him use pt's car to get back and forth to visit pt. The brother called Adelina Morris and told her that he let pt drive yesterday and he did good and he wants to give him his car back. Adelina Morris very concerned. Does not think he should be driving due to his medical conditions. If you want to call her with any questions.  Please call [phone]

## 2021-07-02 NOTE — TELEPHONE ENCOUNTER
I wrote the order and will take it up stairs and will give it to the nurses. [place] driving is a very bad idea. It is not safe. If they need for me to go up and talk to him I will.

## 2021-07-21 NOTE — TELEPHONE ENCOUNTER
Pt daughter called and would like for you to call her directly regarding a driving evaluation that Nicci set up for your pt.  Jesus Oliva number [phone]

## 2021-07-22 NOTE — TELEPHONE ENCOUNTER
I called and spoke to Lizzy Muñiz. . She is concerned that patient may pass driving test. He is not safe to be driving. I will reinforce to him that he is not safe to drive. He should not take test - he is not safe to be driving. His muscle reflex and neurologic response are too slow.

## 2021-07-26 NOTE — TELEPHONE ENCOUNTER
Patient is here with his wife for his appointment. He relates he is having increased swelling and joint pain. Instructed to take acetaminophen 1000 mg 2x/day routine for pain. Also instructed to take furosemide 20 mg daily X 3 days for swelling. Written instructions given. He voiced understanding.

## 2021-08-17 NOTE — PROGRESS NOTES
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 Pleural fluid cytology positive for adenocarcinoma  Multiple masses in the right hemithorax which are pleural based and along the pericardium. ECOG performance status 2  TREATMENT HISTORY:  Next-generation testing with TEMPUS is ordered  Patient planning to have Pleurx catheter placement on 10/19  Patient not a candidate for aggressive chemotherapy therefore we will plan single agent Keytruda, regardless of PD-L1 expression results  His insurance delayed the treatment approval  11/13/20: started on single agent Keytruda. Unfortunately his scan in January showed progression of disease. He has BRAF V6 [id number] mutation. Started Tafinlar plus Mekinist on 3/3/21  Chest abdomen pelvis on 5/4/2021 showed interval improvement in the CT findings  He has fatigue, and required hospitalizations with fall, so he stopped treatment  Now restarted with low dose Taflinar 75 PO daily and Mekinist 1mg    Chief Complaint   Patient presents with    Follow-up     review status of disease    Results     go over ct scan and labs    Cough     dark green mucus    Edema     in hands and ankles    Pain     in joints are worse    Pain     in the bones       INTERVAL HISTORY[de-identified]   Patient is return for follow-up and to discuss lab results, CT scan results and further recommendations. Clinically he is doing well and tolerating the treatment well. He denies any nausea medic, abdominal pain. He has stable shortness of breath and fatigue. His recent scan showed stable disease. He denies any nausea vomiting, diarrhea. He reported that his fatigue in fact is better.       During this visit patient's allergy, social, medical, surgical history and medications were reviewed and updated. HPI in Brief:   The patient is a 80 y.o.  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     [facility] oncology was consulted for suspicion of metastatic disease. Unknown primary.     Patient also reported poor appetite and intermittent episodes of dry cough since 1 month. No significant weight loss reported. Significant risk factors include 13-pack-year history of smoking quit in 1969  Daily drinker drinking 2-3 drinks every day. Denied exposure to chemicals like asbestos/silica  Evaluated by pulmonology. Had IR guided thoracentesis. Surg path report showed adenocarcinoma of lung. Review of systems  General: no fever or night sweats, Weight is stable. ENT: No double or blurred vision, no tinnitus or hearing problem, no dysphagia or sore throat   Respiratory: No chest pain, no shortness of breath, no cough or hemoptysis. Cardiovascular: Denies chest pain, PND or orthopnea. No L E swelling or palpitations. Gastrointestinal:    No nausea or vomiting, abdominal pain, diarrhea or constipation. Genitourinary: Denies dysuria, hematuria, frequency, urgency or incontinence. Neurological: Denies headaches, decreased LOC, no sensory or motor focal deficits.      No Known Allergies    Medications:   Reviewed in Epic   Current Outpatient Medications   Medication Sig Dispense Refill    levothyroxine (SYNTHROID) 88 MCG tablet TAKE 1 TABLET BY MOUTH DAILY 30 tablet 3    Trametinib Dimethyl Sulfoxide (MEKINIST) 0.5 MG TABS Take 1 mg by mouth daily 60 tablet 1    acetaminophen (TYLENOL) 500 MG tablet Take 2 tablets by mouth 2 times daily 1 tablet 0    loratadine (CLARITIN) 10 MG tablet TAKE 1 TABLET BY MOUTH DAILY 30 tablet 2    phenylephrine-APAP-guaiFENesin (MUCINEX FAST-MAX) -400 MG/20ML LIQD Take 20 mLs by mouth nightly 1 mL 0    meloxicam (MOBIC) 15 MG tablet TAKE 1 TABLET BY MOUTH DAILY 30 tablet 3    Dabrafenib Mesylate 75 MG CAPS Take 75 mg by mouth 2 times daily 60 capsule 2    polyethylene glycol (MIRALAX) 17 GM/SCOOP powder Take 17 g by mouth daily as needed (constipation) 510 g 11    hydrOXYzine (ATARAX) 25 MG tablet Take 1 tablet by mouth every 4 hours as needed for Anxiety (tremors) 60 tablet 1    ondansetron (ZOFRAN) 4 MG tablet Take 1 tablet by mouth 3 times daily as needed for Nausea or Vomiting 90 tablet 0    Glucos-Chond-Hyal Ac-Ca Fructo (MOVE FREE JOINT [facility] ADVANCE PO) Take 1 tablet by mouth daily       No current facility-administered medications for this visit. Objective:   Vitals: /70   Pulse 94   Temp 96.1 °F (35.6 °C) (Oral)   Wt 128 lb (58.1 kg)   SpO2 99%   BMI 18.90 kg/m²   General appearance - well appearing, on 2 L nasal cannula. Mental status - alert and cooperative   Mouth - mucous membranes moist  Neck -Nno significant adenopathy, s/p resection of thyroid  Lymphatics - no palpable lymphadenopathy. Chest -bilaterally diminished breath sounds at bases. Bilateral expiratory wheezes noticed.   Heart - normal rate, regular rhythm, normal S1, S2, no murmurs  Abdomen - soft, nontender, nondistended, no masses or organomegaly   Neurological - alert, oriented, normal speech, no focal findings   Musculoskeletal - no joint tenderness, deformity or swelling   Extremities - peripheral pulses normal, no pedal edema, no clubbing or cyanosis   Skin - normal coloration and turgor, no rashes, no suspicious skin lesions noted ,     Data:  Lab Results   Component Value Date    WBC 3.2 (L) 06/24/2021 HGB 9.6 (L) 06/24/2021    HCT 30.2 (L) 06/24/2021    MCV 89.3 06/24/2021     06/24/2021     Lab Results   Component Value Date     06/24/2021    K 4.2 06/24/2021     06/24/2021    CO2 29 06/24/2021    BUN 12 06/24/2021    CREATININE 0.55 (L) 06/24/2021    GLUCOSE 108 (H) 06/24/2021    CALCIUM 8.1 (L) 06/24/2021    PROT 5.6 (L) 06/24/2021    LABALBU 3.1 (L) 06/24/2021    BILITOT 0.20 (L) 06/24/2021    ALKPHOS 91 06/24/2021    AST 14 06/24/2021    ALT <5 (L) 06/24/2021    LABGLOM >60 06/24/2021    GFRAA >60 06/24/2021    GLOB NOT REPORTED 04/18/2021       Surgical Pathology Report   Surgical Pathology   Collected:  10/01/20 1600    Lab status:  Final    Resulting lab:  Get[facility]    Value:  -- Diagnosis --   RIGHT THORACENTESIS FLUID:          METASTATIC ADENOCARCINOMA, COMPATIBLE WITH LUNG PRIMARY. IMAGING DATA:  CT chest PE   No central or segmental pulmonary embolus.         Large right pleural effusion.         Multiple masses within the right hemithorax which appear to be pleural based    and along the pericardium.  Metastatic disease is suspected      Chest x-ray 9/29/2020  Interval increase in now moderate right basilar opacity with opacification of    the inferior 1/2 of the right hemithorax, findings likely related to    increasing moderately large right pleural effusion with underlying    atelectasis/infiltrate and or mass       CT abd pelvis- 10/2/20  Impression    Scattered right pleural soft tissue nodules and mediastinal lymphadenopathy    reflecting malignancy.         Otherwise, no evidence for metastatic disease in the abdomen or pelvis. MR brain  Impression    Unremarkable MRI of brain without abnormal postcontrast enhancement.     1/21/21 CT chest abd pelvis  Impression   Significant increase in the pleural and pericardial studding in the right   hemithorax with large confluent soft tissue masses with only small areas of   pleural sparing along the upper anterolateral pleural margin.       Loculated moderate right pleural effusion that remains simple fluid in   attenuation with a basilar pleural drain in place.       Linear opacities extending from the right suprahilar region to the right lung   apex with surrounding mild ground-glass and interstitial prominence are not   significantly changed relative to prior, but there is slight increase in the   soft tissue fullness about the right hilum.       No findings of metastatic disease below the diaphragm. PMH:  Past Medical History:   Diagnosis Date    Actinic keratosis 5/17/2019    Arthritis     Dyspnea     Essential hypertension 9/28/2018    Fall     General weakness 3/27/2021    H/O head and neck radiation 11/13/2018    Hearing loss     History of malignant neoplasm of thyroid 6/20/2017    History of prostate cancer 11/13/2018    Hypercalcemia 1/15/2021    Hyperlipidemia     Hypertension     Lung cancer ([address] 10/2020    stage 4, cells in pleural space.  Lung mass 9/30/2020    Malignant pleural effusion     RIGHT    Moderate protein-calorie malnutrition ([address] 3/22/2021    Neoplasm of uncertain behavior of skin 5/17/2019    On supplemental oxygen therapy     Osteoarthritis of both hips 11/13/2018    Osteoarthrosis 6/20/2017    Pleural effusion 9/30/2020    Postoperative hypothyroidism 11/13/2018    Primary adenocarcinoma of right lung ([address] 10/15/2020    Prostate cancer ([address] 1992 approx    treated surgically    Sepsis ([address] 3/28/2021    Thyroid cancer ([address] 2017    treated surgically and with radiation    Thyroid disease       Allergies: No Known Allergies     Assessment    1. Non-small cell cancer, right-sided: Large right-sided pleural effusion with multiple pleural-based masses present suspicion for metastatic disease, cytology from thoracentesis showed adenocarcinoma. His cancer negative for EGFR, ALK, ROS1, RET mutations. ,  But positive for BRAF V6 100 E mutation.   PD-L1 expression positive with TPS > 1%. CT abdomen and MRI brain negative for metastasis. Currently he ws started on single agent Keytruda and completed 3 cycles so far. Restaging scans showed progression. Now started on Tafinlar plus Mekinist dose reduced due to side effects  2. Pericardial lymphadenopathy  3. Large right-sided pleural effusion: Status post Pleurx catheter placement  4. Prostrate cancer 28 years ago status post resection   5. Thyroid cancer 4 years ago status post resection and radiotherapy  Plan   I reviewed the recent lab work, imaging studies, diagnosis, goals of care and also discussed treatment options with patient and family   He is feeling better  Currently he is on Tafinlar 75 mg once daily and Mekinist 1 mg  I reviewed the recent CT scan results which showed stable disease  Clinically he is doing well  Return to clinic in 3 months with labs and scans prior    Kev PEREZ [facility], MD  Hematologist/Medical Oncologist    This note is created with the assistance of a speech recognition program.  While intending to generate a document that actually reflects the content of the visit, the document can still have some errors including those of syntax and sound a like substitutions which may escape proof reading. It such instances, actual meaning can be extrapolated by contextual diversion.

## 2021-08-30 NOTE — PROGRESS NOTES
[address] [address] PRIMARY CARE  [id number] [address] [facility]  [address] [address]  Dept: [phone]    Sven Syed is a 80 y.o. male Established patient, who presents today for his medical conditions/complaints as noted below. Chief Complaint   Patient presents with    Edema     same as last visit    Pain     worse than last visit. HPI:     HPI    States he is in constant significant pain  He wakes up in pain. He feels in joints and it is more pronounced with any kind of movement. He takes mucinex at night before he goes to bed for cough. He takes a couple tylenol 2x/day and sometimes some ibuprofen. He hurts moving arm or legs. Pain does get as high as an 8-9/10 on scale. Getting dress is difficult. Walking is not as tough. Productive cough - phlegm,ranges in color from yellow to green. Reviewed prior notes oncology  Reviewed previous Labs 8/10/21    LDL Cholesterol (mg/dL)   Date Value   04/26/2019 58       (goal LDL is <100)   AST (U/L)   Date Value   06/24/2021 14     ALT (U/L)   Date Value   06/24/2021 <5 (L)     BUN (mg/dL)   Date Value   06/24/2021 12     TSH (mIU/L)   Date Value   04/20/2021 0.18 (L)     BP Readings from Last 3 Encounters:   08/30/21 110/62   08/17/21 120/70   06/29/21 (!) 106/50          (goal 120/80)    Past Medical History:   Diagnosis Date    Actinic keratosis 5/17/2019    Arthritis     Dyspnea     Essential hypertension 9/28/2018    Fall     General weakness 3/27/2021    H/O head and neck radiation 11/13/2018    Hearing loss     History of malignant neoplasm of thyroid 6/20/2017    History of prostate cancer 11/13/2018    Hypercalcemia 1/15/2021    Hyperlipidemia     Hypertension     Lung cancer ([address] 10/2020    stage 4, cells in pleural space.     Lung mass 9/30/2020    Malignant pleural effusion     RIGHT    Moderate protein-calorie malnutrition ([address] 3/22/2021    Neoplasm of uncertain behavior of skin 5/17/2019    On supplemental oxygen therapy     Osteoarthritis of both hips 2018    Osteoarthrosis 2017    Pleural effusion 2020    Postoperative hypothyroidism 2018    Primary adenocarcinoma of right lung ([facility] 10/15/2020    Prostate cancer ([facility] 1992 approx    treated surgically    Sepsis ([facility] 3/28/2021    Thyroid cancer ([facility] 2017    treated surgically and with radiation    Thyroid disease       Past Surgical History:   Procedure Laterality Date    CARPAL TUNNEL RELEASE Bilateral     IR INSERT TUNNELED PLEURA CATH W CUFF  10/19/2020    IR INSERT TUNNELED PLEURA CATH W CUFF 10/19/2020 Saima Flaherty MD [facility]    IR REMOVAL OF TUNNELED PLEURAL CATH W CUFF  2021    IR REMOVAL OF TUNNELED PLEURAL CATH W CUFF 2021 D[facility]    MOUTH SURGERY      dental repairs    PROSTATECTOMY      THYROIDECTOMY         Family History   Problem Relation Age of Onset    Prostate Cancer Father     Diabetes Brother        Social History     Tobacco Use    Smoking status: Former Smoker     Packs/day: 1.00     Years: 20.00     Pack years: 20.00     Types: Cigarettes     Quit date:      Years since quittin.6    Smokeless tobacco: Never Used   Substance Use Topics    Alcohol use: Not Currently      Current Outpatient Medications   Medication Sig Dispense Refill    furosemide (LASIX) 20 MG tablet Take 1 tablet by mouth daily for 3 days 1 tablet 0    diclofenac sodium (VOLTAREN) 1 % GEL Apply 4 g topically 4 times daily as needed for Pain 350 g 2    traMADol (ULTRAM) 50 MG tablet Take 1 tablet by mouth 2 times daily for 30 days.  60 tablet 0    azithromycin (ZITHROMAX Z-PABLO) 250 MG tablet Take 2 tablets (500 mg) on Day 1, and then take 1 tablet (250 mg) on days 2 through 5. 1 packet 0    levothyroxine (SYNTHROID) 88 MCG tablet TAKE 1 TABLET BY MOUTH DAILY 30 tablet 3    Trametinib Dimethyl Sulfoxide (MEKINIST) 0.5 MG TABS Take 1 mg by mouth daily 60 tablet 1    acetaminophen (TYLENOL) 500 MG tablet Take 2 tablets by mouth 2 times daily 1 tablet 0    loratadine (CLARITIN) 10 MG tablet TAKE 1 TABLET BY MOUTH DAILY 30 tablet 2    phenylephrine-APAP-guaiFENesin (MUCINEX FAST-MAX) -400 MG/20ML LIQD Take 20 mLs by mouth nightly 1 mL 0    meloxicam (MOBIC) 15 MG tablet TAKE 1 TABLET BY MOUTH DAILY 30 tablet 3    Dabrafenib Mesylate 75 MG CAPS Take 75 mg by mouth 2 times daily 60 capsule 2    polyethylene glycol (MIRALAX) 17 GM/SCOOP powder Take 17 g by mouth daily as needed (constipation) 510 g 11    hydrOXYzine (ATARAX) 25 MG tablet Take 1 tablet by mouth every 4 hours as needed for Anxiety (tremors) 60 tablet 1    ondansetron (ZOFRAN) 4 MG tablet Take 1 tablet by mouth 3 times daily as needed for Nausea or Vomiting 90 tablet 0    Glucos-Chond-Hyal Ac-Ca Fructo (MOVE FREE JOINT HEALTH ADVANCE PO) Take 1 tablet by mouth daily       No current facility-administered medications for this visit. No Known Allergies    Health Maintenance   Topic Date Due    Shingles Vaccine (2 of 2) 05/07/2019    Flu vaccine (1) 09/01/2021    PSA counseling  10/01/2021    Annual Wellness Visit (AWV)  03/23/2022    TSH testing  04/20/2022    Potassium monitoring  08/10/2022    Creatinine monitoring  08/10/2022    DTaP/Tdap/Td vaccine (2 - Td or Tdap) 04/14/2031    Pneumococcal 65+ years Vaccine  Completed    COVID-19 Vaccine  Completed    Hepatitis A vaccine  Aged Out    Hepatitis B vaccine  Aged Out    Hib vaccine  Aged Out    Meningococcal (ACWY) vaccine  Aged Out       Subjective:      Review of Systems   Constitutional: Positive for fatigue. Appetite continues to be poor. HENT: Positive for congestion. Negative for ear pain. Eyes: Negative for pain and redness. Respiratory: Positive for cough (moist) and shortness of breath. Cardiovascular: Positive for leg swelling. Negative for chest pain and palpitations. Gastrointestinal: Positive for constipation. Negative for diarrhea and nausea. Endocrine: Negative for polydipsia, polyphagia and polyuria. Genitourinary: Negative for difficulty urinating and dysuria. Musculoskeletal: Positive for arthralgias, back pain and myalgias. Skin: Negative for rash and wound. Neurological: Positive for weakness and light-headedness. Psychiatric/Behavioral: Negative for dysphoric mood and sleep disturbance. The patient is nervous/anxious. Objective:     /62   Pulse 67   Wt 125 lb 14.4 oz (57.1 kg)   SpO2 97%   BMI 18.59 kg/m²   Physical Exam  Vitals and nursing note reviewed. Constitutional:       Appearance: Normal appearance. HENT:      Head: Normocephalic and atraumatic. Right Ear: Tympanic membrane, ear canal and external ear normal.      Left Ear: Tympanic membrane, ear canal and external ear normal.      Nose: Nose normal.      Mouth/Throat:      Mouth: Mucous membranes are moist.   Eyes:      Pupils: Pupils are equal, round, and reactive to light. Cardiovascular:      Rate and Rhythm: Regular rhythm. Heart sounds: Normal heart sounds. Comments: No carotid bruit  Pulmonary:      Effort: Pulmonary effort is normal.      Breath sounds: Rales present. Comments: Rales throughout  Abdominal:      General: Bowel sounds are normal.      Palpations: Abdomen is soft. Tenderness: There is no abdominal tenderness. Musculoskeletal:      Right shoulder: Tenderness present. Decreased range of motion. Left shoulder: Tenderness present. Decreased range of motion. Cervical back: Normal range of motion and neck supple. Right hip: Tenderness and bony tenderness present. Decreased range of motion. Left hip: Tenderness and bony tenderness present. Decreased range of motion. Right lower le+ Pitting Edema present. Left lower le+ Pitting Edema present. Comments: Difficulty lifting arms.   Unable to sit for extended period due to hip pain. Skin:     General: Skin is warm and dry. Neurological:      Mental Status: He is alert and oriented to person, place, and time. Motor: Weakness present. Psychiatric:         Mood and Affect: Mood normal.         Speech: Speech normal.         Thought Content: Thought content normal.         Cognition and Memory: He exhibits impaired recent memory. Assessment/Plan:   1. Localized edema  -     furosemide (LASIX) 20 MG tablet; Take 1 tablet by mouth daily for 3 days, Disp-1 tablet, R-0NO PRINT  2. Cough  -     furosemide (LASIX) 20 MG tablet; Take 1 tablet by mouth daily for 3 days, Disp-1 tablet, R-0NO PRINT  -     XR CHEST STANDARD (2 VW); Future  3. Osteoarthritis, unspecified osteoarthritis type, unspecified site  -     diclofenac sodium (VOLTAREN) 1 % GEL; Apply 4 g topically 4 times daily as needed for Pain, Topical, 4 TIMES DAILY PRN Starting Mon 8/30/2021, Disp-350 g, R-2, Normal  4. Rales  -     XR CHEST STANDARD (2 VW); Future     Discussed pain medication and patient declined at this time. He will continue tylenol as needed. Voltaren ordered PRN    Return in about 4 weeks (around 9/27/2021) for pain/edema. Orders Placed This Encounter   Procedures    XR CHEST STANDARD (2 VW)     Standing Status:   Future     Standing Expiration Date:   8/30/2022     Order Specific Question:   Reason for exam:     Answer:   rales and cough     Orders Placed This Encounter   Medications    furosemide (LASIX) 20 MG tablet     Sig: Take 1 tablet by mouth daily for 3 days     Dispense:  1 tablet     Refill:  0    diclofenac sodium (VOLTAREN) 1 % GEL     Sig: Apply 4 g topically 4 times daily as needed for Pain     Dispense:  350 g     Refill:  2       Patient given educational materials - see patient instructions. Discussed use, benefit, and side effects of prescribed medications. All patient questions answered. Pt voiced understanding.  Reviewed health maintenance. Instructed to continue current medications, diet and exercise. Patient agreed with treatment plan. Follow up as directed.      Electronically signed by LO Gupta CNP on 8/30/2021 at 2:25 PM

## 2021-08-30 NOTE — PATIENT INSTRUCTIONS
1. Take furosemide 20 mg daily X 3 days  2. Voltaren gel 1% - apply topically to area of joint pain up to 4x/day as needed for pain  3.  Chest x-ray

## 2021-09-13 NOTE — TELEPHONE ENCOUNTER
Pt daughter phoned in stating that her father is having more pain than usual.  States his bones and muscles hurt. Is taking mobic but doesn't seem to help. He was ordered Tramadol 8/17  but not sure if he has taken any. He is in assisted living and takes his own meds. She is going to make sure he has tried this,  if not, could you order him something stronger for pain? Will address with Dr Haider Granado. Adam Araujo called and had her Dad go through his medication and he could not find this. Also I called the pharmacy and they stated he picked it up on 8/17. Daughter is going to go out to the home and look for this. She states she is sure he has not taken any. Will call back and we can address with Dr Haider Granado.

## 2021-09-15 NOTE — TELEPHONE ENCOUNTER
Pt's daughter, Antonia Marroquin, called back stating pt's wife has alzheimers and is known for flushing pills down the toilet. Antonia Marroquin states her father would like to try the Tramadol for pain. Refill due. Gail Angeline have her Dad lock his medicines in lock box and she verbalizes understanding.   Script routed to Dr. Lola Rosa for approval.

## 2021-10-04 PROBLEM — I10 ESSENTIAL HYPERTENSION: Status: RESOLVED | Noted: 2018-09-28 | Resolved: 2021-01-01

## 2021-10-04 NOTE — PROGRESS NOTES
[address]  PRIMARY CARE  [address] [address] [address]  Dept: [phone]    Darylene Deck is a 80 y.o. male Established patient, who presents today for his medical conditions/complaints as noted below. Chief Complaint   Patient presents with    Edema     better at night - getting worse throughout the day. HPI:     HPI  States he is sleeping better and is not noticing the constant level of pain he had before. States he is not constipated. Bowel movements are natural. He only has one every few days. Appetite is still not good. He eats something at every sitting. His weight is steady. He is worried about wife who is in hospital with a fractured right hip. She has surgery scheduled for tomorrow. Occasional cough sometimes productive. His voice is raspy at times  He uses biotene dry mouth spray. Labs completed 9/3/2021 hemoglobin low but stable    Reviewed prior notes oncology  Reviewed previous Labs 9/3/21    LDL Cholesterol (mg/dL)   Date Value   04/26/2019 58       (goal LDL is <100)   AST (U/L)   Date Value   06/24/2021 14     ALT (U/L)   Date Value   06/24/2021 <5 (L)     BUN (mg/dL)   Date Value   09/03/2021 13     TSH (mIU/L)   Date Value   04/20/2021 0.18 (L)     BP Readings from Last 3 Encounters:   10/04/21 130/68   08/30/21 110/62   08/17/21 120/70          (goal 120/80)    Past Medical History:   Diagnosis Date    Actinic keratosis 5/17/2019    Arthritis     Dyspnea     Essential hypertension 9/28/2018    Fall     General weakness 3/27/2021    H/O head and neck radiation 11/13/2018    Hearing loss     History of malignant neoplasm of thyroid 6/20/2017    History of prostate cancer 11/13/2018    Hypercalcemia 1/15/2021    Hyperlipidemia     Hypertension     Lung cancer ([address] 10/2020    stage 4, cells in pleural space.     Lung mass 9/30/2020    Malignant pleural effusion     RIGHT    Moderate protein-calorie malnutrition ([facility] 3/22/2021    Neoplasm of uncertain behavior of skin 2019    On supplemental oxygen therapy     Osteoarthritis of both hips 2018    Osteoarthrosis 2017    Pleural effusion 2020    Postoperative hypothyroidism 2018    Primary adenocarcinoma of right lung ([facility] 10/15/2020    Prostate cancer ([facility] 1992 approx    treated surgically    Sepsis ([facility] 3/28/2021    Thyroid cancer ([facility] 2017    treated surgically and with radiation    Thyroid disease       Past Surgical History:   Procedure Laterality Date    CARPAL TUNNEL RELEASE Bilateral     IR INSERT TUNNELED PLEURA CATH W CUFF  10/19/2020    IR INSERT TUNNELED PLEURA CATH W CUFF 10/19/2020 Genevieve Kapoor MD [facility]    IR REMOVAL OF TUNNELED PLEURAL CATH W CUFF  2021    IR REMOVAL OF TUNNELED PLEURAL CATH W CUFF 2021 [facility]    MOUTH SURGERY      dental repairs    PROSTATECTOMY      THYROIDECTOMY         Family History   Problem Relation Age of Onset    Prostate Cancer Father     Diabetes Brother        Social History     Tobacco Use    Smoking status: Former Smoker     Packs/day: 1.00     Years: 20.00     Pack years: 20.00     Types: Cigarettes     Quit date:      Years since quittin.7    Smokeless tobacco: Never Used   Substance Use Topics    Alcohol use: Not Currently      Current Outpatient Medications   Medication Sig Dispense Refill    meloxicam (MOBIC) 15 MG tablet Take 1 tablet by mouth daily 30 tablet 3    Trametinib Dimethyl Sulfoxide (MEKINIST) 0.5 MG TABS Take 1 mg by mouth daily 60 tablet 1    traMADol (ULTRAM) 50 MG tablet Take 1 tablet by mouth 2 times daily for 30 days.  60 tablet 0    diclofenac sodium (VOLTAREN) 1 % GEL Apply 4 g topically 4 times daily as needed for Pain 350 g 2    levothyroxine (SYNTHROID) 88 MCG tablet TAKE 1 TABLET BY MOUTH DAILY 30 tablet 3    acetaminophen (TYLENOL) 500 MG tablet Take 2 tablets by mouth 2 times daily 1 tablet 0    loratadine (CLARITIN) 10 MG tablet TAKE 1 TABLET BY MOUTH DAILY 30 tablet 2    Dabrafenib Mesylate 75 MG CAPS Take 75 mg by mouth 2 times daily 60 capsule 2    polyethylene glycol (MIRALAX) 17 GM/SCOOP powder Take 17 g by mouth daily as needed (constipation) 510 g 11    ondansetron (ZOFRAN) 4 MG tablet Take 1 tablet by mouth 3 times daily as needed for Nausea or Vomiting 90 tablet 0    Glucos-Chond-Hyal Ac-Ca Fructo (MOVE FREE JOINT HEALTH ADVANCE PO) Take 1 tablet by mouth daily      furosemide (LASIX) 20 MG tablet Take 1 tablet by mouth daily for 3 days 1 tablet 0     No current facility-administered medications for this visit. No Known Allergies    Health Maintenance   Topic Date Due    Shingles Vaccine (2 of 2) 05/07/2019    Flu vaccine (1) 09/01/2021    PSA counseling  10/01/2021    Annual Wellness Visit (AWV)  03/23/2022    TSH testing  04/20/2022    DTaP/Tdap/Td vaccine (2 - Td or Tdap) 04/14/2031    Pneumococcal 65+ years Vaccine  Completed    COVID-19 Vaccine  Completed    Hepatitis A vaccine  Aged Out    Hepatitis B vaccine  Aged Out    Hib vaccine  Aged Out    Meningococcal (ACWY) vaccine  Aged Out       Subjective:      Review of Systems   Constitutional: Positive for fatigue. Negative for appetite change, chills and fever. HENT: Positive for congestion and sore throat. Negative for dental problem, nosebleeds, postnasal drip, rhinorrhea, sinus pressure and sinus pain. Cardiovascular: Positive for leg swelling. Negative for palpitations. Gastrointestinal: Positive for constipation. Negative for diarrhea and nausea. Musculoskeletal: Positive for arthralgias, back pain and gait problem. Skin: Negative for rash and wound. Psychiatric/Behavioral: Negative for sleep disturbance. The patient is nervous/anxious. Objective:     /68   Wt 128 lb 6.4 oz (58.2 kg)   BMI 18.96 kg/m²   Physical Exam  Vitals and nursing note reviewed. Constitutional:       Appearance: He is ill-appearing. HENT:      Head: Normocephalic and atraumatic. Right Ear: Tympanic membrane, ear canal and external ear normal.      Left Ear: Tympanic membrane, ear canal and external ear normal.   Eyes:      Conjunctiva/sclera: Conjunctivae normal.      Pupils: Pupils are equal, round, and reactive to light. Cardiovascular:      Heart sounds: Normal heart sounds. Comments: No carotid bruit  Pulmonary:      Effort: Pulmonary effort is normal.      Breath sounds: Normal breath sounds. Abdominal:      General: Bowel sounds are normal.      Palpations: Abdomen is soft. Musculoskeletal:      Right lower le+ Pitting Edema present. Left lower le+ Pitting Edema present. Skin:     General: Skin is warm. Neurological:      Mental Status: He is alert and oriented to person, place, and time. Motor: No weakness. Psychiatric:         Mood and Affect: Mood normal.         Thought Content: Thought content normal.         Assessment/Plan:   1. Osteoarthritis, unspecified osteoarthritis type, unspecified site  -     meloxicam (MOBIC) 15 MG tablet; Take 1 tablet by mouth daily, Disp-30 tablet, R-3NO PRINT  2. Need for influenza vaccination  -     INFLUENZA, QUADV, ADJUVANTED, 65 YRS =, IM, PF, PREFILL SYR, 0.5ML (FLUAD)     Labs reviewed  Return in about 3 months (around 2022) for pain, edema. Orders Placed This Encounter   Procedures    INFLUENZA, QUADV, ADJUVANTED, 65 YRS =, IM, PF, PREFILL SYR, 0.5ML (FLUAD)     Orders Placed This Encounter   Medications    meloxicam (MOBIC) 15 MG tablet     Sig: Take 1 tablet by mouth daily     Dispense:  30 tablet     Refill:  3       Patient given educational materials - see patient instructions. Discussed use, benefit, and side effects of prescribed medications. All patient questions answered. Pt voiced understanding. Reviewed health maintenance.   Instructed to continue current medications, diet and exercise. Patient agreed with treatment plan. Follow up as directed.      Electronically signed by LO Centeno CNP on 10/4/2021 at 2:32 PM

## 2021-10-20 NOTE — TELEPHONE ENCOUNTER
Writer discussed the skin lesions on River's face with Dr Valerie Ware. Dr Valerie Ware would like River to follow up with a dermatologist. Micaela Gaspar called [facility], no answer. Left message regarding taking River to a dermatologist. Contact number given. Will monitor.

## 2021-10-26 PROBLEM — U07.1 COVID: Status: ACTIVE | Noted: 2021-01-01

## 2021-10-26 PROBLEM — U07.1 COVID-19: Status: ACTIVE | Noted: 2021-01-01

## 2021-10-26 NOTE — PROGRESS NOTES
Was observed for the one hour post infusion, Put in Wheelchair and assited to the [employer].  Dhaval from [facility] given discharge papers ,

## 2021-10-26 NOTE — PROGRESS NOTES
Arrived from [facility], Ambulatory with cane, States had COVID + test 5 days ago.  Reitred, Oriented x3  Has small cell lung CA

## 2021-10-26 NOTE — PROGRESS NOTES
Angel Ventura is a 80 y.o. male being seen for his monthly follow up. Location of visit: [facility] [place]    Visit Date:  10/25/2021       Reason for Visit:    Chief Complaint   Patient presents with    Shortness of Breath        HPI   He tested positive for Covid last week and did not have symptoms originally. He had a fever over the weekend and has a cough now. His appetite is poor and has been poor for months. Wife is in nursing recovering from a fractured weekend. Review of Systems   Constitutional: Positive for fatigue and fever. Negative for chills. HENT: Negative for congestion, nosebleeds and sinus pain. Respiratory: Positive for cough and shortness of breath. Cardiovascular: Positive for palpitations. Negative for chest pain and leg swelling. Gastrointestinal: Negative for constipation, diarrhea and nausea. Genitourinary: Negative for difficulty urinating and dysuria. Skin: Negative for rash and wound. Psychiatric/Behavioral: Positive for sleep disturbance. Negative for confusion. The patient is nervous/anxious. Physical Exam  Vitals and nursing note reviewed. Constitutional:       Appearance: He is ill-appearing. HENT:      Head: Normocephalic and atraumatic. Right Ear: External ear normal.      Left Ear: External ear normal.   Cardiovascular:      Rate and Rhythm: Normal rate and regular rhythm. Heart sounds: Normal heart sounds. Pulmonary:      Breath sounds: Decreased breath sounds and rales present. Comments: Rales throughout  Musculoskeletal:      Cervical back: Normal range of motion. Neurological:      Mental Status: He is alert.           Current Outpatient Medications   Medication Sig Dispense Refill    traMADol (ULTRAM) 50 MG tablet TAKE 1 TABLET BY MOUTH TWICE DAILY 60 tablet 0    meloxicam (MOBIC) 15 MG tablet Take 1 tablet by mouth daily 30 tablet 3    Dabrafenib Mesylate 75 MG CAPS Take 75 mg by mouth 2 times daily 60 capsule